# Patient Record
Sex: FEMALE | Race: WHITE | NOT HISPANIC OR LATINO | Employment: OTHER | URBAN - NONMETROPOLITAN AREA
[De-identification: names, ages, dates, MRNs, and addresses within clinical notes are randomized per-mention and may not be internally consistent; named-entity substitution may affect disease eponyms.]

---

## 2017-01-11 ENCOUNTER — APPOINTMENT (OUTPATIENT)
Dept: MAMMOGRAPHY | Facility: HOSPITAL | Age: 62
End: 2017-01-11

## 2017-01-12 ENCOUNTER — APPOINTMENT (OUTPATIENT)
Dept: BONE DENSITY | Facility: HOSPITAL | Age: 62
End: 2017-01-12

## 2017-02-06 ENCOUNTER — APPOINTMENT (OUTPATIENT)
Dept: MAMMOGRAPHY | Facility: HOSPITAL | Age: 62
End: 2017-02-06

## 2017-02-06 ENCOUNTER — APPOINTMENT (OUTPATIENT)
Dept: BONE DENSITY | Facility: HOSPITAL | Age: 62
End: 2017-02-06

## 2017-02-20 ENCOUNTER — TRANSCRIBE ORDERS (OUTPATIENT)
Dept: ADMINISTRATIVE | Facility: HOSPITAL | Age: 62
End: 2017-02-20

## 2017-02-20 ENCOUNTER — APPOINTMENT (OUTPATIENT)
Dept: BONE DENSITY | Facility: HOSPITAL | Age: 62
End: 2017-02-20

## 2017-02-20 ENCOUNTER — APPOINTMENT (OUTPATIENT)
Dept: MAMMOGRAPHY | Facility: HOSPITAL | Age: 62
End: 2017-02-20

## 2017-02-20 DIAGNOSIS — M81.0 OSTEOPOROSIS: Primary | ICD-10-CM

## 2017-03-06 ENCOUNTER — APPOINTMENT (OUTPATIENT)
Dept: BONE DENSITY | Facility: HOSPITAL | Age: 62
End: 2017-03-06

## 2017-03-06 ENCOUNTER — APPOINTMENT (OUTPATIENT)
Dept: MAMMOGRAPHY | Facility: HOSPITAL | Age: 62
End: 2017-03-06

## 2017-03-16 ENCOUNTER — APPOINTMENT (OUTPATIENT)
Dept: BONE DENSITY | Facility: HOSPITAL | Age: 62
End: 2017-03-16

## 2017-03-16 ENCOUNTER — APPOINTMENT (OUTPATIENT)
Dept: MAMMOGRAPHY | Facility: HOSPITAL | Age: 62
End: 2017-03-16

## 2017-03-17 ENCOUNTER — HOSPITAL ENCOUNTER (EMERGENCY)
Age: 62
Discharge: HOME | End: 2017-03-17
Attending: EMERGENCY MEDICINE
Payer: MEDICAID

## 2017-03-17 ENCOUNTER — APPOINTMENT (OUTPATIENT)
Dept: CT IMAGING | Age: 62
End: 2017-03-17
Payer: MEDICAID

## 2017-03-17 VITALS
OXYGEN SATURATION: 95 % | BODY MASS INDEX: 41.78 KG/M2 | SYSTOLIC BLOOD PRESSURE: 126 MMHG | HEART RATE: 95 BPM | TEMPERATURE: 98.2 F | RESPIRATION RATE: 18 BRPM | WEIGHT: 260 LBS | DIASTOLIC BLOOD PRESSURE: 70 MMHG | HEIGHT: 66 IN

## 2017-03-17 DIAGNOSIS — R11.2 NON-INTRACTABLE VOMITING WITH NAUSEA, UNSPECIFIED VOMITING TYPE: ICD-10-CM

## 2017-03-17 DIAGNOSIS — R10.9 ABDOMINAL PAIN, UNSPECIFIED LOCATION: Primary | ICD-10-CM

## 2017-03-17 LAB
ALBUMIN SERPL-MCNC: 4.1 G/DL (ref 3.5–5.2)
ALP BLD-CCNC: 85 U/L (ref 35–104)
ALT SERPL-CCNC: 33 U/L (ref 5–33)
ANION GAP SERPL CALCULATED.3IONS-SCNC: 10 MMOL/L (ref 7–19)
AST SERPL-CCNC: 27 U/L (ref 5–32)
BILIRUB SERPL-MCNC: 0.4 MG/DL (ref 0.2–1.2)
BILIRUBIN URINE: NEGATIVE
BLOOD, URINE: NEGATIVE
BUN BLDV-MCNC: 13 MG/DL (ref 8–23)
CALCIUM SERPL-MCNC: 9.4 MG/DL (ref 8.8–10.2)
CHLORIDE BLD-SCNC: 103 MMOL/L (ref 98–111)
CLARITY: CLEAR
CO2: 28 MMOL/L (ref 22–29)
COLOR: YELLOW
CREAT SERPL-MCNC: 0.6 MG/DL (ref 0.5–0.9)
GFR NON-AFRICAN AMERICAN: >60
GLOBULIN: 3.1 G/DL
GLUCOSE BLD-MCNC: 103 MG/DL (ref 74–109)
GLUCOSE URINE: NEGATIVE MG/DL
HCT VFR BLD CALC: 42.6 % (ref 37–47)
HEMOGLOBIN: 13.8 G/DL (ref 12–16)
KETONES, URINE: NEGATIVE MG/DL
LACTIC ACID: 1.1 MG/DL (ref 0.5–1.9)
LEUKOCYTE ESTERASE, URINE: NEGATIVE
LIPASE: 19 U/L (ref 13–60)
MCH RBC QN AUTO: 33.3 PG (ref 27–31)
MCHC RBC AUTO-ENTMCNC: 32.4 G/DL (ref 33–37)
MCV RBC AUTO: 102.7 FL (ref 81–99)
NITRITE, URINE: NEGATIVE
PDW BLD-RTO: 14.2 % (ref 11.5–14.5)
PERFORMED ON: NORMAL
PH UA: 6.5
PLATELET # BLD: 246 K/UL (ref 130–400)
PMV BLD AUTO: 12.3 FL (ref 7.4–10.4)
POC TROPONIN I: 0 NG/ML (ref 0–0.08)
POTASSIUM SERPL-SCNC: 4.3 MMOL/L (ref 3.5–5)
PROTEIN UA: NEGATIVE MG/DL
RBC # BLD: 4.15 M/UL (ref 4.2–5.4)
SODIUM BLD-SCNC: 141 MMOL/L (ref 136–145)
SPECIFIC GRAVITY UA: 1
TOTAL PROTEIN: 7.2 G/DL (ref 6.6–8.7)
UROBILINOGEN, URINE: 0.2 E.U./DL
WBC # BLD: 8.8 K/UL (ref 4.8–10.8)

## 2017-03-17 PROCEDURE — 36415 COLL VENOUS BLD VENIPUNCTURE: CPT

## 2017-03-17 PROCEDURE — 81003 URINALYSIS AUTO W/O SCOPE: CPT

## 2017-03-17 PROCEDURE — 85027 COMPLETE CBC AUTOMATED: CPT

## 2017-03-17 PROCEDURE — 96374 THER/PROPH/DIAG INJ IV PUSH: CPT

## 2017-03-17 PROCEDURE — 6360000004 HC RX CONTRAST MEDICATION: Performed by: EMERGENCY MEDICINE

## 2017-03-17 PROCEDURE — 99284 EMERGENCY DEPT VISIT MOD MDM: CPT

## 2017-03-17 PROCEDURE — 99283 EMERGENCY DEPT VISIT LOW MDM: CPT | Performed by: EMERGENCY MEDICINE

## 2017-03-17 PROCEDURE — 93005 ELECTROCARDIOGRAM TRACING: CPT

## 2017-03-17 PROCEDURE — 80053 COMPREHEN METABOLIC PANEL: CPT

## 2017-03-17 PROCEDURE — 83605 ASSAY OF LACTIC ACID: CPT

## 2017-03-17 PROCEDURE — 84484 ASSAY OF TROPONIN QUANT: CPT

## 2017-03-17 PROCEDURE — 96375 TX/PRO/DX INJ NEW DRUG ADDON: CPT

## 2017-03-17 PROCEDURE — 6360000002 HC RX W HCPCS: Performed by: EMERGENCY MEDICINE

## 2017-03-17 PROCEDURE — 83690 ASSAY OF LIPASE: CPT

## 2017-03-17 PROCEDURE — 74177 CT ABD & PELVIS W/CONTRAST: CPT

## 2017-03-17 RX ORDER — ONDANSETRON 2 MG/ML
4 INJECTION INTRAMUSCULAR; INTRAVENOUS ONCE
Status: COMPLETED | OUTPATIENT
Start: 2017-03-17 | End: 2017-03-17

## 2017-03-17 RX ORDER — GLUCOSAMINE HCL 500 MG
10000 TABLET ORAL DAILY
Status: ON HOLD | COMMUNITY
End: 2017-09-25 | Stop reason: HOSPADM

## 2017-03-17 RX ORDER — LANOLIN ALCOHOL/MO/W.PET/CERES
4000 CREAM (GRAM) TOPICAL DAILY
Status: ON HOLD | COMMUNITY
End: 2017-09-25 | Stop reason: HOSPADM

## 2017-03-17 RX ORDER — ONDANSETRON 4 MG/1
4 TABLET, ORALLY DISINTEGRATING ORAL EVERY 8 HOURS PRN
Qty: 15 TABLET | Refills: 0 | Status: SHIPPED | OUTPATIENT
Start: 2017-03-17 | End: 2018-03-27 | Stop reason: ALTCHOICE

## 2017-03-17 RX ORDER — DICLOFENAC SODIUM 75 MG/1
75 TABLET, DELAYED RELEASE ORAL 2 TIMES DAILY
Status: ON HOLD | COMMUNITY
End: 2017-09-25 | Stop reason: HOSPADM

## 2017-03-17 RX ADMIN — IOVERSOL 90 ML: 741 INJECTION INTRA-ARTERIAL; INTRAVENOUS at 10:47

## 2017-03-17 RX ADMIN — HYDROMORPHONE HYDROCHLORIDE 0.5 MG: 1 INJECTION, SOLUTION INTRAMUSCULAR; INTRAVENOUS; SUBCUTANEOUS at 09:34

## 2017-03-17 RX ADMIN — ONDANSETRON 4 MG: 2 INJECTION INTRAMUSCULAR; INTRAVENOUS at 09:33

## 2017-03-17 ASSESSMENT — ENCOUNTER SYMPTOMS
SHORTNESS OF BREATH: 0
NAUSEA: 1
DIARRHEA: 0
ABDOMINAL PAIN: 1
EYE PAIN: 0
VOMITING: 1

## 2017-03-17 ASSESSMENT — PAIN SCALES - GENERAL
PAINLEVEL_OUTOF10: 9
PAINLEVEL_OUTOF10: 10

## 2017-03-17 ASSESSMENT — PAIN DESCRIPTION - LOCATION: LOCATION: ABDOMEN

## 2017-03-17 ASSESSMENT — PAIN DESCRIPTION - ORIENTATION: ORIENTATION: RIGHT

## 2017-03-17 ASSESSMENT — PAIN DESCRIPTION - DESCRIPTORS: DESCRIPTORS: DULL;ACHING

## 2017-03-17 ASSESSMENT — PAIN DESCRIPTION - PAIN TYPE: TYPE: ACUTE PAIN

## 2017-03-21 LAB
EKG P AXIS: 6 DEGREES
EKG P-R INTERVAL: 170 MS
EKG Q-T INTERVAL: 378 MS
EKG QRS DURATION: 80 MS
EKG QTC CALCULATION (BAZETT): 402 MS
EKG T AXIS: 50 DEGREES

## 2017-04-05 ENCOUNTER — APPOINTMENT (OUTPATIENT)
Dept: BONE DENSITY | Facility: HOSPITAL | Age: 62
End: 2017-04-05

## 2017-04-05 ENCOUNTER — APPOINTMENT (OUTPATIENT)
Dept: MAMMOGRAPHY | Facility: HOSPITAL | Age: 62
End: 2017-04-05

## 2017-04-27 ENCOUNTER — APPOINTMENT (OUTPATIENT)
Dept: BONE DENSITY | Facility: HOSPITAL | Age: 62
End: 2017-04-27

## 2017-04-27 ENCOUNTER — APPOINTMENT (OUTPATIENT)
Dept: MAMMOGRAPHY | Facility: HOSPITAL | Age: 62
End: 2017-04-27

## 2017-05-23 ENCOUNTER — HOSPITAL ENCOUNTER (OUTPATIENT)
Dept: BONE DENSITY | Facility: HOSPITAL | Age: 62
Discharge: HOME | End: 2017-05-23

## 2017-05-23 ENCOUNTER — HOSPITAL ENCOUNTER (OUTPATIENT)
Dept: MAMMOGRAPHY | Facility: HOSPITAL | Age: 62
Discharge: HOME | End: 2017-05-23
Admitting: NURSE PRACTITIONER

## 2017-05-23 DIAGNOSIS — Z01.419 WELL WOMAN EXAM WITH ROUTINE GYNECOLOGICAL EXAM: ICD-10-CM

## 2017-05-23 DIAGNOSIS — M81.0 OSTEOPOROSIS: ICD-10-CM

## 2017-05-23 PROCEDURE — 77063 BREAST TOMOSYNTHESIS BI: CPT

## 2017-05-23 PROCEDURE — 77080 DXA BONE DENSITY AXIAL: CPT

## 2017-05-23 PROCEDURE — G0202 SCR MAMMO BI INCL CAD: HCPCS

## 2017-06-11 ENCOUNTER — APPOINTMENT (OUTPATIENT)
Dept: CT IMAGING | Age: 62
End: 2017-06-11
Payer: MEDICAID

## 2017-06-11 ENCOUNTER — HOSPITAL ENCOUNTER (EMERGENCY)
Age: 62
Discharge: HOME | End: 2017-06-11
Payer: MEDICAID

## 2017-06-11 ENCOUNTER — APPOINTMENT (OUTPATIENT)
Dept: GENERAL RADIOLOGY | Age: 62
End: 2017-06-11
Payer: MEDICAID

## 2017-06-11 VITALS
BODY MASS INDEX: 40.98 KG/M2 | WEIGHT: 255 LBS | DIASTOLIC BLOOD PRESSURE: 66 MMHG | HEART RATE: 74 BPM | HEIGHT: 66 IN | TEMPERATURE: 98.7 F | RESPIRATION RATE: 20 BRPM | SYSTOLIC BLOOD PRESSURE: 126 MMHG | OXYGEN SATURATION: 93 %

## 2017-06-11 DIAGNOSIS — S63.501A SPRAIN OF WRIST, RIGHT, INITIAL ENCOUNTER: ICD-10-CM

## 2017-06-11 DIAGNOSIS — M25.512 ACUTE PAIN OF LEFT SHOULDER: ICD-10-CM

## 2017-06-11 DIAGNOSIS — S09.90XA CLOSED HEAD INJURY, INITIAL ENCOUNTER: ICD-10-CM

## 2017-06-11 DIAGNOSIS — S16.1XXA CERVICAL STRAIN, INITIAL ENCOUNTER: ICD-10-CM
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
## 2017-06-11 DIAGNOSIS — S93.401A SPRAIN OF RIGHT ANKLE, UNSPECIFIED LIGAMENT, INITIAL ENCOUNTER: Primary | ICD-10-CM

## 2017-06-11 PROCEDURE — 99283 EMERGENCY DEPT VISIT LOW MDM: CPT | Performed by: PHYSICIAN ASSISTANT

## 2017-06-11 PROCEDURE — 73110 X-RAY EXAM OF WRIST: CPT

## 2017-06-11 PROCEDURE — 72125 CT NECK SPINE W/O DYE: CPT

## 2017-06-11 PROCEDURE — 70450 CT HEAD/BRAIN W/O DYE: CPT

## 2017-06-11 PROCEDURE — 73610 X-RAY EXAM OF ANKLE: CPT

## 2017-06-11 PROCEDURE — 73030 X-RAY EXAM OF SHOULDER: CPT

## 2017-06-11 PROCEDURE — 6360000002 HC RX W HCPCS: Performed by: PHYSICIAN ASSISTANT

## 2017-06-11 PROCEDURE — 96372 THER/PROPH/DIAG INJ SC/IM: CPT

## 2017-06-11 PROCEDURE — 99284 EMERGENCY DEPT VISIT MOD MDM: CPT

## 2017-06-11 RX ORDER — ORPHENADRINE CITRATE 30 MG/ML
60 INJECTION INTRAMUSCULAR; INTRAVENOUS ONCE
Status: COMPLETED | OUTPATIENT
Start: 2017-06-11 | End: 2017-06-11

## 2017-06-11 RX ORDER — CYCLOBENZAPRINE HCL 10 MG
5 TABLET ORAL 2 TIMES DAILY PRN
Qty: 20 TABLET | Refills: 0 | Status: SHIPPED | OUTPATIENT
Start: 2017-06-11

## 2017-06-11 RX ADMIN — ORPHENADRINE CITRATE 60 MG: 30 INJECTION INTRAMUSCULAR; INTRAVENOUS at 10:24

## 2017-06-11 ASSESSMENT — ENCOUNTER SYMPTOMS
VOMITING: 0
CONSTIPATION: 0
CHEST TIGHTNESS: 0
RHINORRHEA: 0
SHORTNESS OF BREATH: 0
SORE THROAT: 0
EYE PAIN: 0
NAUSEA: 0
ABDOMINAL DISTENTION: 0
WHEEZING: 0
COUGH: 0
ABDOMINAL PAIN: 0
DIARRHEA: 0
SINUS PRESSURE: 0
APNEA: 0

## 2017-06-11 ASSESSMENT — PAIN SCALES - GENERAL: PAINLEVEL_OUTOF10: 7

## 2017-07-11 ENCOUNTER — OFFICE VISIT (OUTPATIENT)
Dept: RETAIL CLINIC | Facility: CLINIC | Age: 62
End: 2017-07-11

## 2017-07-11 VITALS
OXYGEN SATURATION: 97 % | RESPIRATION RATE: 20 BRPM | SYSTOLIC BLOOD PRESSURE: 138 MMHG | TEMPERATURE: 98.1 F | DIASTOLIC BLOOD PRESSURE: 81 MMHG | HEART RATE: 98 BPM

## 2017-07-11 DIAGNOSIS — J02.9 ACUTE PHARYNGITIS, UNSPECIFIED ETIOLOGY: Primary | ICD-10-CM

## 2017-07-11 DIAGNOSIS — R59.0 LYMPHADENOPATHY, ANTERIOR CERVICAL: ICD-10-CM

## 2017-07-11 LAB
EXPIRATION DATE: NORMAL
INTERNAL CONTROL: NORMAL
Lab: NORMAL
S PYO AG THROAT QL: NEGATIVE

## 2017-07-11 PROCEDURE — 99213 OFFICE O/P EST LOW 20 MIN: CPT | Performed by: NURSE PRACTITIONER

## 2017-07-11 PROCEDURE — 87880 STREP A ASSAY W/OPTIC: CPT | Performed by: NURSE PRACTITIONER

## 2017-07-11 RX ORDER — AMOXICILLIN 875 MG/1
875 TABLET, COATED ORAL 2 TIMES DAILY
Qty: 20 TABLET | Refills: 0 | OUTPATIENT
Start: 2017-07-11 | End: 2018-08-02

## 2017-07-11 NOTE — PATIENT INSTRUCTIONS

## 2017-07-11 NOTE — PROGRESS NOTES
"Gaurav Davies is a 62 y.o. female here for sore throat.     Sore Throat    This is a new problem. The current episode started in the past 7 days (7 days ago, started after her son had a sore throat). The problem has been gradually worsening. Neither side of throat is experiencing more pain than the other. There has been no fever. The pain is moderate. Associated symptoms include neck pain (in gland area, it is hurting) and swollen glands. Pertinent negatives include no abdominal pain, congestion, coughing, diarrhea, drooling, ear discharge, ear pain, headaches, hoarse voice, plugged ear sensation, shortness of breath, trouble swallowing or vomiting. She has had no exposure to strep or mono. She has tried nothing for the symptoms.       The following portions of the patient's history were reviewed and updated as appropriate: allergies, current medications, past family history, past medical history, past social history, past surgical history and problem list.    Review of Systems   Constitutional: Negative for activity change and appetite change.   HENT: Positive for sore throat. Negative for congestion, drooling, ear discharge, ear pain, hoarse voice, postnasal drip, rhinorrhea and trouble swallowing.    Eyes: Negative for discharge and redness.   Respiratory: Negative for cough, chest tightness and shortness of breath.    Cardiovascular: Negative for leg swelling.   Gastrointestinal: Negative for abdominal pain, diarrhea, nausea and vomiting.   Genitourinary: Negative for dysuria.   Musculoskeletal: Positive for neck pain (in gland area, it is hurting). Negative for arthralgias and joint swelling.   Skin: Positive for rash (has been treating \"ringworm\" on left shin with lotrimin, and it is better).   Allergic/Immunologic: Negative for environmental allergies and food allergies.   Neurological: Negative for seizures and headaches.   Hematological: Positive for adenopathy.       Objective   Physical Exam " "  Constitutional: She is oriented to person, place, and time. She appears well-developed and well-nourished. No distress.   HENT:   Head: Normocephalic and atraumatic.   Right Ear: External ear normal.   Left Ear: External ear normal.   Nose: Nose normal.   Mouth/Throat: No oropharyngeal exudate (pharynx is beefy red, no PND noted, no exudate).   TM's WNL bilaterally   Eyes: Conjunctivae and EOM are normal. Pupils are equal, round, and reactive to light. Right eye exhibits no discharge. Left eye exhibits no discharge. No scleral icterus.   Neck: Normal range of motion. Neck supple. No JVD present. No tracheal deviation present. No thyromegaly present.   Cardiovascular: Normal rate, regular rhythm and normal heart sounds.    Pulmonary/Chest: Effort normal and breath sounds normal. No stridor. No respiratory distress. She has no wheezes.   Abdominal: Soft. She exhibits no distension. There is no tenderness.   Musculoskeletal: Normal range of motion. She exhibits no edema, tenderness or deformity.   Wearing ACE wrap right ankle (says she sprained ankle)   Lymphadenopathy:     She has cervical adenopathy (bilateral anterior cervical LAD tonsillar region).   Neurological: She is alert and oriented to person, place, and time. She exhibits normal muscle tone. Coordination normal.   Skin: Skin is warm and dry. No rash (2 \" patchy erythema on left lower shin that she says is improved after lotrimin) noted. She is not diaphoretic. No erythema. No pallor.   Psychiatric: She has a normal mood and affect. Her behavior is normal. Judgment and thought content normal.   Nursing note and vitals reviewed.      Assessment/Plan   Taylor was seen today for sore throat.    Diagnoses and all orders for this visit:    Acute pharyngitis, unspecified etiology  -     POC Rapid Strep A    Lymphadenopathy, anterior cervical  -     POC Rapid Strep A    Other orders  -     amoxicillin (AMOXIL) 875 MG tablet; Take 1 tablet by mouth 2 (Two) Times " a Day.       Strep ordered as she has been around her son and several other close contacts.  It was negative; give amoxicillin to have on hand if throat worsens or does not improve.

## 2017-09-20 ENCOUNTER — HOSPITAL ENCOUNTER (INPATIENT)
Age: 62
LOS: 5 days | Discharge: HOME | DRG: 885 | End: 2017-09-25
Attending: EMERGENCY MEDICINE | Admitting: PSYCHIATRY & NEUROLOGY
Payer: MEDICAID

## 2017-09-20 DIAGNOSIS — R45.851 DEPRESSION WITH SUICIDAL IDEATION: Primary | ICD-10-CM

## 2017-09-20 DIAGNOSIS — F32.A DEPRESSION WITH SUICIDAL IDEATION: Primary | ICD-10-CM

## 2017-09-20 DIAGNOSIS — R74.8 LIVER ENZYME ELEVATION: ICD-10-CM

## 2017-09-20 LAB
ACETAMINOPHEN LEVEL: <15 UG/ML
ALBUMIN SERPL-MCNC: 4.3 G/DL (ref 3.5–5.2)
ALP BLD-CCNC: 84 U/L (ref 35–104)
ALT SERPL-CCNC: 43 U/L (ref 5–33)
AMPHETAMINE SCREEN, URINE: NEGATIVE
ANION GAP SERPL CALCULATED.3IONS-SCNC: 13 MMOL/L (ref 7–19)
AST SERPL-CCNC: 35 U/L (ref 5–32)
BACTERIA: NEGATIVE /HPF
BARBITURATE SCREEN URINE: NEGATIVE
BASOPHILS ABSOLUTE: 0.1 K/UL (ref 0–0.2)
BASOPHILS RELATIVE PERCENT: 0.5 % (ref 0–1)
BENZODIAZEPINE SCREEN, URINE: NEGATIVE
BILIRUB SERPL-MCNC: 0.3 MG/DL (ref 0.2–1.2)
BILIRUBIN URINE: NEGATIVE
BLOOD, URINE: NEGATIVE
BUN BLDV-MCNC: 19 MG/DL (ref 8–23)
CALCIUM SERPL-MCNC: 9.7 MG/DL (ref 8.8–10.2)
CANNABINOID SCREEN URINE: NEGATIVE
CHLORIDE BLD-SCNC: 99 MMOL/L (ref 98–111)
CLARITY: CLEAR
CO2: 26 MMOL/L (ref 22–29)
COCAINE METABOLITE SCREEN URINE: NEGATIVE
COLOR: YELLOW
CREAT SERPL-MCNC: 0.6 MG/DL (ref 0.5–0.9)
EOSINOPHILS ABSOLUTE: 0.2 K/UL (ref 0–0.6)
EOSINOPHILS RELATIVE PERCENT: 1.9 % (ref 0–5)
EPITHELIAL CELLS, UA: 1 /HPF (ref 0–5)
ETHANOL: <10 MG/DL (ref 0–0.08)
GFR NON-AFRICAN AMERICAN: >60
GLUCOSE BLD-MCNC: 131 MG/DL (ref 70–99)
GLUCOSE BLD-MCNC: 142 MG/DL (ref 74–109)
GLUCOSE URINE: NEGATIVE MG/DL
HCT VFR BLD CALC: 44.8 % (ref 37–47)
HEMOGLOBIN: 15 G/DL (ref 12–16)
HYALINE CASTS: 0 /HPF (ref 0–8)
KETONES, URINE: NEGATIVE MG/DL
LEUKOCYTE ESTERASE, URINE: ABNORMAL
LYMPHOCYTES ABSOLUTE: 2.8 K/UL (ref 1.1–4.5)
LYMPHOCYTES RELATIVE PERCENT: 27.8 % (ref 20–40)
Lab: NORMAL
MCH RBC QN AUTO: 33.3 PG (ref 27–31)
MCHC RBC AUTO-ENTMCNC: 33.5 G/DL (ref 33–37)
MCV RBC AUTO: 99.6 FL (ref 81–99)
MONOCYTES ABSOLUTE: 0.7 K/UL (ref 0–0.9)
MONOCYTES RELATIVE PERCENT: 6.8 % (ref 0–10)
NEUTROPHILS ABSOLUTE: 6.4 K/UL (ref 1.5–7.5)
NEUTROPHILS RELATIVE PERCENT: 62.8 % (ref 50–65)
NITRITE, URINE: NEGATIVE
OPIATE SCREEN URINE: NEGATIVE
PDW BLD-RTO: 13.2 % (ref 11.5–14.5)
PERFORMED ON: ABNORMAL
PH UA: 5.5
PLATELET # BLD: 219 K/UL (ref 130–400)
PMV BLD AUTO: 11.8 FL (ref 9.4–12.3)
POTASSIUM SERPL-SCNC: 3.7 MMOL/L (ref 3.5–5)
PROTEIN UA: NEGATIVE MG/DL
RBC # BLD: 4.5 M/UL (ref 4.2–5.4)
RBC UA: 4 /HPF (ref 0–4)
SALICYLATE, SERUM: <3 MG/DL (ref 3–10)
SODIUM BLD-SCNC: 138 MMOL/L (ref 136–145)
SPECIFIC GRAVITY UA: 1.02
TOTAL PROTEIN: 7.6 G/DL (ref 6.6–8.7)
UROBILINOGEN, URINE: 0.2 E.U./DL
WBC # BLD: 10.2 K/UL (ref 4.8–10.8)
WBC UA: 2 /HPF (ref 0–5)

## 2017-09-20 PROCEDURE — 81001 URINALYSIS AUTO W/SCOPE: CPT

## 2017-09-20 PROCEDURE — 87086 URINE CULTURE/COLONY COUNT: CPT

## 2017-09-20 PROCEDURE — 6370000000 HC RX 637 (ALT 250 FOR IP): Performed by: EMERGENCY MEDICINE

## 2017-09-20 PROCEDURE — 1240000000 HC EMOTIONAL WELLNESS R&B

## 2017-09-20 PROCEDURE — 80307 DRUG TEST PRSMV CHEM ANLYZR: CPT

## 2017-09-20 PROCEDURE — 80053 COMPREHEN METABOLIC PANEL: CPT

## 2017-09-20 PROCEDURE — G0480 DRUG TEST DEF 1-7 CLASSES: HCPCS

## 2017-09-20 PROCEDURE — 82948 REAGENT STRIP/BLOOD GLUCOSE: CPT

## 2017-09-20 PROCEDURE — 99284 EMERGENCY DEPT VISIT MOD MDM: CPT | Performed by: EMERGENCY MEDICINE

## 2017-09-20 PROCEDURE — 85025 COMPLETE CBC W/AUTO DIFF WBC: CPT

## 2017-09-20 PROCEDURE — 99285 EMERGENCY DEPT VISIT HI MDM: CPT

## 2017-09-20 PROCEDURE — 36415 COLL VENOUS BLD VENIPUNCTURE: CPT

## 2017-09-20 RX ORDER — GABAPENTIN 100 MG/1
100 CAPSULE ORAL 3 TIMES DAILY
COMMUNITY
End: 2018-03-27 | Stop reason: ALTCHOICE

## 2017-09-20 RX ORDER — ACETAMINOPHEN 325 MG/1
650 TABLET ORAL EVERY 4 HOURS PRN
Status: DISCONTINUED | OUTPATIENT
Start: 2017-09-20 | End: 2017-09-25 | Stop reason: HOSPADM

## 2017-09-20 RX ORDER — CLOPIDOGREL BISULFATE 75 MG/1
75 TABLET ORAL DAILY
COMMUNITY
End: 2020-12-22

## 2017-09-20 RX ORDER — CLONAZEPAM 0.5 MG/1
0.5 TABLET ORAL ONCE
Status: COMPLETED | OUTPATIENT
Start: 2017-09-20 | End: 2017-09-20

## 2017-09-20 RX ADMIN — CLONAZEPAM 0.5 MG: 0.5 TABLET ORAL at 20:42

## 2017-09-21 PROBLEM — F43.10 POST TRAUMATIC STRESS DISORDER: Status: ACTIVE | Noted: 2017-09-21

## 2017-09-21 PROBLEM — F17.210 CIGARETTE NICOTINE DEPENDENCE WITHOUT COMPLICATION: Status: ACTIVE | Noted: 2017-09-21

## 2017-09-21 PROBLEM — M79.7 FIBROMYALGIA: Status: ACTIVE | Noted: 2017-09-21

## 2017-09-21 PROBLEM — E11.9 TYPE 2 DIABETES MELLITUS WITHOUT COMPLICATION (HCC): Status: ACTIVE | Noted: 2017-09-21

## 2017-09-21 PROBLEM — F13.21 BENZODIAZEPINE DEPENDENCE IN REMISSION (HCC): Status: ACTIVE | Noted: 2017-09-21

## 2017-09-21 PROBLEM — G47.30 SLEEP APNEA: Status: ACTIVE | Noted: 2017-09-21

## 2017-09-21 PROBLEM — F11.10 OPIOID ABUSE, EPISODIC USE (HCC): Status: ACTIVE | Noted: 2017-09-21

## 2017-09-21 PROBLEM — F31.9 BIPOLAR I DISORDER WITH ANXIOUS DISTRESS (HCC): Status: ACTIVE | Noted: 2017-09-21

## 2017-09-21 LAB
GLUCOSE BLD-MCNC: 108 MG/DL (ref 70–99)
GLUCOSE BLD-MCNC: 114 MG/DL (ref 70–99)
GLUCOSE BLD-MCNC: 130 MG/DL (ref 70–99)
GLUCOSE BLD-MCNC: 187 MG/DL (ref 70–99)
PERFORMED ON: ABNORMAL

## 2017-09-21 PROCEDURE — 1240000000 HC EMOTIONAL WELLNESS R&B

## 2017-09-21 PROCEDURE — 6370000000 HC RX 637 (ALT 250 FOR IP): Performed by: PSYCHIATRY & NEUROLOGY

## 2017-09-21 PROCEDURE — 82948 REAGENT STRIP/BLOOD GLUCOSE: CPT

## 2017-09-21 PROCEDURE — 90792 PSYCH DIAG EVAL W/MED SRVCS: CPT | Performed by: PSYCHIATRY & NEUROLOGY

## 2017-09-21 RX ORDER — GABAPENTIN 100 MG/1
100 CAPSULE ORAL 3 TIMES DAILY
Status: DISCONTINUED | OUTPATIENT
Start: 2017-09-21 | End: 2017-09-25 | Stop reason: HOSPADM

## 2017-09-21 RX ORDER — QUETIAPINE FUMARATE 50 MG/1
50 TABLET, FILM COATED ORAL NIGHTLY
Status: DISCONTINUED | OUTPATIENT
Start: 2017-09-21 | End: 2017-09-25

## 2017-09-21 RX ORDER — CLONAZEPAM 0.5 MG/1
0.5 TABLET ORAL ONCE
Status: COMPLETED | OUTPATIENT
Start: 2017-09-21 | End: 2017-09-21

## 2017-09-21 RX ORDER — SENNA PLUS 8.6 MG/1
1 TABLET ORAL NIGHTLY
Status: DISCONTINUED | OUTPATIENT
Start: 2017-09-21 | End: 2017-09-25

## 2017-09-21 RX ORDER — CLOPIDOGREL BISULFATE 75 MG/1
75 TABLET ORAL DAILY
Status: DISCONTINUED | OUTPATIENT
Start: 2017-09-21 | End: 2017-09-25 | Stop reason: HOSPADM

## 2017-09-21 RX ORDER — TRAZODONE HYDROCHLORIDE 50 MG/1
50 TABLET ORAL NIGHTLY PRN
Status: DISCONTINUED | OUTPATIENT
Start: 2017-09-21 | End: 2017-09-25

## 2017-09-21 RX ORDER — TRAZODONE HYDROCHLORIDE 50 MG/1
50 TABLET ORAL NIGHTLY
Status: DISCONTINUED | OUTPATIENT
Start: 2017-09-21 | End: 2017-09-21

## 2017-09-21 RX ORDER — CYCLOBENZAPRINE HCL 5 MG
5 TABLET ORAL 2 TIMES DAILY PRN
Status: DISCONTINUED | OUTPATIENT
Start: 2017-09-21 | End: 2017-09-25 | Stop reason: HOSPADM

## 2017-09-21 RX ORDER — DOCUSATE SODIUM 100 MG/1
100 CAPSULE, LIQUID FILLED ORAL DAILY
Status: DISCONTINUED | OUTPATIENT
Start: 2017-09-21 | End: 2017-09-25 | Stop reason: HOSPADM

## 2017-09-21 RX ORDER — HYDROXYZINE PAMOATE 25 MG/1
25 CAPSULE ORAL 3 TIMES DAILY PRN
Status: ON HOLD | COMMUNITY
End: 2017-09-25 | Stop reason: HOSPADM

## 2017-09-21 RX ORDER — DULOXETIN HYDROCHLORIDE 60 MG/1
120 CAPSULE, DELAYED RELEASE ORAL DAILY
Status: DISCONTINUED | OUTPATIENT
Start: 2017-09-21 | End: 2017-09-25 | Stop reason: HOSPADM

## 2017-09-21 RX ADMIN — TRAZODONE HYDROCHLORIDE 50 MG: 50 TABLET ORAL at 02:08

## 2017-09-21 RX ADMIN — TRAZODONE HYDROCHLORIDE 50 MG: 50 TABLET ORAL at 22:26

## 2017-09-21 RX ADMIN — ACETAMINOPHEN 650 MG: 325 TABLET, FILM COATED ORAL at 02:07

## 2017-09-21 RX ADMIN — CLOPIDOGREL BISULFATE 75 MG: 75 TABLET ORAL at 20:55

## 2017-09-21 RX ADMIN — ACETAMINOPHEN 650 MG: 325 TABLET, FILM COATED ORAL at 14:32

## 2017-09-21 RX ADMIN — QUETIAPINE FUMARATE 50 MG: 50 TABLET, FILM COATED ORAL at 20:17

## 2017-09-21 RX ADMIN — ACETAMINOPHEN 650 MG: 325 TABLET, FILM COATED ORAL at 19:38

## 2017-09-21 RX ADMIN — DOCUSATE SODIUM 100 MG: 100 CAPSULE, LIQUID FILLED ORAL at 19:27

## 2017-09-21 RX ADMIN — CLONAZEPAM 0.5 MG: 0.5 TABLET ORAL at 02:06

## 2017-09-21 RX ADMIN — METFORMIN HYDROCHLORIDE 500 MG: 500 TABLET, FILM COATED ORAL at 21:52

## 2017-09-21 RX ADMIN — CYCLOBENZAPRINE 5 MG: 5 TABLET, FILM COATED ORAL at 20:55

## 2017-09-21 RX ADMIN — DULOXETINE HYDROCHLORIDE 120 MG: 60 CAPSULE, DELAYED RELEASE ORAL at 19:27

## 2017-09-21 RX ADMIN — SENNOSIDES 8.6 MG: 8.6 TABLET, FILM COATED ORAL at 20:17

## 2017-09-21 RX ADMIN — GABAPENTIN 100 MG: 100 CAPSULE ORAL at 20:17

## 2017-09-21 ASSESSMENT — PAIN SCALES - GENERAL
PAINLEVEL_OUTOF10: 5
PAINLEVEL_OUTOF10: 8
PAINLEVEL_OUTOF10: 5
PAINLEVEL_OUTOF10: 3

## 2017-09-21 ASSESSMENT — SLEEP AND FATIGUE QUESTIONNAIRES
DIFFICULTY FALLING ASLEEP: YES
DIFFICULTY STAYING ASLEEP: YES
RESTFUL SLEEP: YES
DO YOU HAVE DIFFICULTY SLEEPING: YES
DIFFICULTY ARISING: NO
SLEEP PATTERN: DIFFICULTY FALLING ASLEEP;NIGHTMARES/TERRORS
AVERAGE NUMBER OF SLEEP HOURS: 8
DO YOU USE A SLEEP AID: YES

## 2017-09-21 ASSESSMENT — PATIENT HEALTH QUESTIONNAIRE - PHQ9: SUM OF ALL RESPONSES TO PHQ QUESTIONS 1-9: 9

## 2017-09-21 ASSESSMENT — LIFESTYLE VARIABLES: HISTORY_ALCOHOL_USE: NO

## 2017-09-22 LAB
CHOLESTEROL, TOTAL: 105 MG/DL (ref 160–199)
FOLATE: >20 NG/ML (ref 4.8–37.3)
GLUCOSE BLD-MCNC: 102 MG/DL (ref 70–99)
GLUCOSE BLD-MCNC: 110 MG/DL (ref 70–99)
GLUCOSE BLD-MCNC: 114 MG/DL (ref 70–99)
GLUCOSE BLD-MCNC: 118 MG/DL (ref 70–99)
HBA1C MFR BLD: 6.8 %
HDLC SERPL-MCNC: 42 MG/DL (ref 65–121)
LDL CHOLESTEROL CALCULATED: 41 MG/DL
PERFORMED ON: ABNORMAL
TRIGL SERPL-MCNC: 112 MG/DL (ref 150–199)
TSH SERPL DL<=0.05 MIU/L-ACNC: 1.45 UIU/ML (ref 0.27–4.2)
URINE CULTURE, ROUTINE: NORMAL
VITAMIN B-12: 554 PG/ML (ref 211–946)
VITAMIN D 25-HYDROXY: 52.4 NG/ML

## 2017-09-22 PROCEDURE — 82948 REAGENT STRIP/BLOOD GLUCOSE: CPT

## 2017-09-22 PROCEDURE — 6370000000 HC RX 637 (ALT 250 FOR IP): Performed by: PSYCHIATRY & NEUROLOGY

## 2017-09-22 PROCEDURE — 80061 LIPID PANEL: CPT

## 2017-09-22 PROCEDURE — 36415 COLL VENOUS BLD VENIPUNCTURE: CPT

## 2017-09-22 PROCEDURE — 82306 VITAMIN D 25 HYDROXY: CPT

## 2017-09-22 PROCEDURE — 82607 VITAMIN B-12: CPT

## 2017-09-22 PROCEDURE — 1240000000 HC EMOTIONAL WELLNESS R&B

## 2017-09-22 PROCEDURE — 82746 ASSAY OF FOLIC ACID SERUM: CPT

## 2017-09-22 PROCEDURE — 99231 SBSQ HOSP IP/OBS SF/LOW 25: CPT | Performed by: PSYCHIATRY & NEUROLOGY

## 2017-09-22 PROCEDURE — 84443 ASSAY THYROID STIM HORMONE: CPT

## 2017-09-22 PROCEDURE — 83036 HEMOGLOBIN GLYCOSYLATED A1C: CPT

## 2017-09-22 RX ADMIN — ACETAMINOPHEN 650 MG: 325 TABLET, FILM COATED ORAL at 04:59

## 2017-09-22 RX ADMIN — GABAPENTIN 100 MG: 100 CAPSULE ORAL at 08:32

## 2017-09-22 RX ADMIN — DULOXETINE HYDROCHLORIDE 120 MG: 60 CAPSULE, DELAYED RELEASE ORAL at 08:32

## 2017-09-22 RX ADMIN — METFORMIN HYDROCHLORIDE 500 MG: 500 TABLET, FILM COATED ORAL at 17:35

## 2017-09-22 RX ADMIN — GABAPENTIN 100 MG: 100 CAPSULE ORAL at 20:35

## 2017-09-22 RX ADMIN — ACETAMINOPHEN 650 MG: 325 TABLET, FILM COATED ORAL at 10:52

## 2017-09-22 RX ADMIN — CLOPIDOGREL BISULFATE 75 MG: 75 TABLET ORAL at 08:32

## 2017-09-22 RX ADMIN — QUETIAPINE FUMARATE 50 MG: 50 TABLET, FILM COATED ORAL at 21:15

## 2017-09-22 RX ADMIN — TRAZODONE HYDROCHLORIDE 50 MG: 50 TABLET ORAL at 23:16

## 2017-09-22 RX ADMIN — SENNOSIDES 8.6 MG: 8.6 TABLET, FILM COATED ORAL at 20:35

## 2017-09-22 RX ADMIN — METFORMIN HYDROCHLORIDE 500 MG: 500 TABLET, FILM COATED ORAL at 08:33

## 2017-09-22 RX ADMIN — GABAPENTIN 100 MG: 100 CAPSULE ORAL at 12:42

## 2017-09-22 RX ADMIN — ACETAMINOPHEN 650 MG: 325 TABLET, FILM COATED ORAL at 19:19

## 2017-09-22 RX ADMIN — ACETAMINOPHEN 650 MG: 325 TABLET, FILM COATED ORAL at 15:16

## 2017-09-22 RX ADMIN — DOCUSATE SODIUM 100 MG: 100 CAPSULE, LIQUID FILLED ORAL at 08:32

## 2017-09-22 RX ADMIN — CYCLOBENZAPRINE 5 MG: 5 TABLET, FILM COATED ORAL at 21:15

## 2017-09-22 ASSESSMENT — PAIN SCALES - GENERAL
PAINLEVEL_OUTOF10: 8
PAINLEVEL_OUTOF10: 4
PAINLEVEL_OUTOF10: 4
PAINLEVEL_OUTOF10: 3
PAINLEVEL_OUTOF10: 5

## 2017-09-23 LAB
GLUCOSE BLD-MCNC: 114 MG/DL (ref 70–99)
GLUCOSE BLD-MCNC: 117 MG/DL (ref 70–99)
GLUCOSE BLD-MCNC: 124 MG/DL (ref 70–99)
GLUCOSE BLD-MCNC: 99 MG/DL (ref 70–99)
PERFORMED ON: ABNORMAL
PERFORMED ON: NORMAL

## 2017-09-23 PROCEDURE — 6370000000 HC RX 637 (ALT 250 FOR IP): Performed by: NURSE PRACTITIONER

## 2017-09-23 PROCEDURE — 82948 REAGENT STRIP/BLOOD GLUCOSE: CPT

## 2017-09-23 PROCEDURE — 6370000000 HC RX 637 (ALT 250 FOR IP): Performed by: PSYCHIATRY & NEUROLOGY

## 2017-09-23 PROCEDURE — 6360000002 HC RX W HCPCS: Performed by: NURSE PRACTITIONER

## 2017-09-23 PROCEDURE — 1240000000 HC EMOTIONAL WELLNESS R&B

## 2017-09-23 PROCEDURE — 99231 SBSQ HOSP IP/OBS SF/LOW 25: CPT | Performed by: NURSE PRACTITIONER

## 2017-09-23 RX ORDER — ONDANSETRON 4 MG/1
4 TABLET, FILM COATED ORAL EVERY 6 HOURS PRN
Status: DISCONTINUED | OUTPATIENT
Start: 2017-09-23 | End: 2017-09-25 | Stop reason: HOSPADM

## 2017-09-23 RX ORDER — CLONIDINE HYDROCHLORIDE 0.1 MG/1
0.1 TABLET ORAL EVERY 4 HOURS PRN
Status: DISCONTINUED | OUTPATIENT
Start: 2017-09-23 | End: 2017-09-25 | Stop reason: HOSPADM

## 2017-09-23 RX ADMIN — ONDANSETRON HYDROCHLORIDE 4 MG: 4 TABLET, FILM COATED ORAL at 16:50

## 2017-09-23 RX ADMIN — TRAZODONE HYDROCHLORIDE 50 MG: 50 TABLET ORAL at 21:35

## 2017-09-23 RX ADMIN — DOCUSATE SODIUM 100 MG: 100 CAPSULE, LIQUID FILLED ORAL at 08:56

## 2017-09-23 RX ADMIN — SENNOSIDES 8.6 MG: 8.6 TABLET, FILM COATED ORAL at 21:19

## 2017-09-23 RX ADMIN — QUETIAPINE FUMARATE 50 MG: 50 TABLET, FILM COATED ORAL at 21:19

## 2017-09-23 RX ADMIN — ACETAMINOPHEN 650 MG: 325 TABLET, FILM COATED ORAL at 17:43

## 2017-09-23 RX ADMIN — CYCLOBENZAPRINE 5 MG: 5 TABLET, FILM COATED ORAL at 21:18

## 2017-09-23 RX ADMIN — ACETAMINOPHEN 650 MG: 325 TABLET, FILM COATED ORAL at 21:44

## 2017-09-23 RX ADMIN — CHLORASEPTIC 1 SPRAY: 1.5 LIQUID ORAL at 13:12

## 2017-09-23 RX ADMIN — CYCLOBENZAPRINE 5 MG: 5 TABLET, FILM COATED ORAL at 08:56

## 2017-09-23 RX ADMIN — ONDANSETRON HYDROCHLORIDE 4 MG: 4 TABLET, FILM COATED ORAL at 14:59

## 2017-09-23 RX ADMIN — GABAPENTIN 100 MG: 100 CAPSULE ORAL at 21:19

## 2017-09-23 RX ADMIN — ACETAMINOPHEN 650 MG: 325 TABLET, FILM COATED ORAL at 04:32

## 2017-09-23 RX ADMIN — METFORMIN HYDROCHLORIDE 500 MG: 500 TABLET, FILM COATED ORAL at 17:25

## 2017-09-23 RX ADMIN — ACETAMINOPHEN 650 MG: 325 TABLET, FILM COATED ORAL at 13:12

## 2017-09-23 RX ADMIN — METFORMIN HYDROCHLORIDE 500 MG: 500 TABLET, FILM COATED ORAL at 08:56

## 2017-09-23 RX ADMIN — DULOXETINE HYDROCHLORIDE 120 MG: 60 CAPSULE, DELAYED RELEASE ORAL at 08:55

## 2017-09-23 RX ADMIN — GABAPENTIN 100 MG: 100 CAPSULE ORAL at 13:12

## 2017-09-23 RX ADMIN — GABAPENTIN 100 MG: 100 CAPSULE ORAL at 08:56

## 2017-09-23 RX ADMIN — CHLORASEPTIC 1 SPRAY: 1.5 LIQUID ORAL at 17:44

## 2017-09-23 RX ADMIN — CLOPIDOGREL BISULFATE 75 MG: 75 TABLET ORAL at 08:55

## 2017-09-23 RX ADMIN — ACETAMINOPHEN 650 MG: 325 TABLET, FILM COATED ORAL at 08:56

## 2017-09-23 ASSESSMENT — PAIN SCALES - GENERAL
PAINLEVEL_OUTOF10: 5
PAINLEVEL_OUTOF10: 4
PAINLEVEL_OUTOF10: 7
PAINLEVEL_OUTOF10: 4
PAINLEVEL_OUTOF10: 4
PAINLEVEL_OUTOF10: 8

## 2017-09-24 LAB
GLUCOSE BLD-MCNC: 125 MG/DL (ref 70–99)
GLUCOSE BLD-MCNC: 139 MG/DL (ref 70–99)
GLUCOSE BLD-MCNC: 146 MG/DL (ref 70–99)
GLUCOSE BLD-MCNC: 92 MG/DL (ref 70–99)
PERFORMED ON: ABNORMAL
PERFORMED ON: NORMAL

## 2017-09-24 PROCEDURE — 82948 REAGENT STRIP/BLOOD GLUCOSE: CPT

## 2017-09-24 PROCEDURE — 1240000000 HC EMOTIONAL WELLNESS R&B

## 2017-09-24 PROCEDURE — 6370000000 HC RX 637 (ALT 250 FOR IP): Performed by: PSYCHIATRY & NEUROLOGY

## 2017-09-24 PROCEDURE — 6360000002 HC RX W HCPCS: Performed by: NURSE PRACTITIONER

## 2017-09-24 RX ADMIN — DULOXETINE HYDROCHLORIDE 120 MG: 60 CAPSULE, DELAYED RELEASE ORAL at 08:37

## 2017-09-24 RX ADMIN — CHLORASEPTIC 1 SPRAY: 1.5 LIQUID ORAL at 03:34

## 2017-09-24 RX ADMIN — SENNOSIDES 8.6 MG: 8.6 TABLET, FILM COATED ORAL at 21:17

## 2017-09-24 RX ADMIN — CYCLOBENZAPRINE 5 MG: 5 TABLET, FILM COATED ORAL at 08:37

## 2017-09-24 RX ADMIN — CYCLOBENZAPRINE 5 MG: 5 TABLET, FILM COATED ORAL at 21:17

## 2017-09-24 RX ADMIN — METFORMIN HYDROCHLORIDE 500 MG: 500 TABLET, FILM COATED ORAL at 08:37

## 2017-09-24 RX ADMIN — ACETAMINOPHEN 650 MG: 325 TABLET, FILM COATED ORAL at 16:24

## 2017-09-24 RX ADMIN — ACETAMINOPHEN 650 MG: 325 TABLET, FILM COATED ORAL at 03:32

## 2017-09-24 RX ADMIN — CHLORASEPTIC 1 SPRAY: 1.5 LIQUID ORAL at 08:37

## 2017-09-24 RX ADMIN — TRAZODONE HYDROCHLORIDE 50 MG: 50 TABLET ORAL at 22:36

## 2017-09-24 RX ADMIN — CHLORASEPTIC 1 SPRAY: 1.5 LIQUID ORAL at 22:36

## 2017-09-24 RX ADMIN — CLOPIDOGREL BISULFATE 75 MG: 75 TABLET ORAL at 08:37

## 2017-09-24 RX ADMIN — ONDANSETRON HYDROCHLORIDE 4 MG: 4 TABLET, FILM COATED ORAL at 11:30

## 2017-09-24 RX ADMIN — QUETIAPINE FUMARATE 50 MG: 50 TABLET, FILM COATED ORAL at 21:17

## 2017-09-24 RX ADMIN — DOCUSATE SODIUM 100 MG: 100 CAPSULE, LIQUID FILLED ORAL at 08:37

## 2017-09-24 RX ADMIN — ONDANSETRON HYDROCHLORIDE 4 MG: 4 TABLET, FILM COATED ORAL at 17:38

## 2017-09-24 RX ADMIN — GABAPENTIN 100 MG: 100 CAPSULE ORAL at 21:17

## 2017-09-24 RX ADMIN — GABAPENTIN 100 MG: 100 CAPSULE ORAL at 13:29

## 2017-09-24 RX ADMIN — ACETAMINOPHEN 650 MG: 325 TABLET, FILM COATED ORAL at 08:37

## 2017-09-24 RX ADMIN — GABAPENTIN 100 MG: 100 CAPSULE ORAL at 08:37

## 2017-09-24 RX ADMIN — METFORMIN HYDROCHLORIDE 500 MG: 500 TABLET, FILM COATED ORAL at 17:15

## 2017-09-24 RX ADMIN — CHLORASEPTIC 1 SPRAY: 1.5 LIQUID ORAL at 16:24

## 2017-09-24 ASSESSMENT — PAIN SCALES - GENERAL
PAINLEVEL_OUTOF10: 3
PAINLEVEL_OUTOF10: 2
PAINLEVEL_OUTOF10: 2
PAINLEVEL_OUTOF10: 3

## 2017-09-25 VITALS
SYSTOLIC BLOOD PRESSURE: 141 MMHG | WEIGHT: 259.5 LBS | TEMPERATURE: 96.3 F | BODY MASS INDEX: 41.71 KG/M2 | OXYGEN SATURATION: 92 % | RESPIRATION RATE: 20 BRPM | HEIGHT: 66 IN | HEART RATE: 76 BPM | DIASTOLIC BLOOD PRESSURE: 68 MMHG

## 2017-09-25 LAB
GLUCOSE BLD-MCNC: 128 MG/DL (ref 70–99)
GLUCOSE BLD-MCNC: 91 MG/DL (ref 70–99)
PERFORMED ON: ABNORMAL
PERFORMED ON: NORMAL

## 2017-09-25 PROCEDURE — 6370000000 HC RX 637 (ALT 250 FOR IP): Performed by: PSYCHIATRY & NEUROLOGY

## 2017-09-25 PROCEDURE — 5130000000 HC BRIDGE APPOINTMENT

## 2017-09-25 PROCEDURE — 6360000002 HC RX W HCPCS: Performed by: NURSE PRACTITIONER

## 2017-09-25 PROCEDURE — 82948 REAGENT STRIP/BLOOD GLUCOSE: CPT

## 2017-09-25 RX ORDER — QUETIAPINE FUMARATE 100 MG/1
100 TABLET, FILM COATED ORAL NIGHTLY
Qty: 30 TABLET | Refills: 1 | Status: SHIPPED | OUTPATIENT
Start: 2017-09-25 | End: 2018-03-27 | Stop reason: ALTCHOICE

## 2017-09-25 RX ORDER — QUETIAPINE FUMARATE 100 MG/1
100 TABLET, FILM COATED ORAL NIGHTLY
Status: DISCONTINUED | OUTPATIENT
Start: 2017-09-25 | End: 2017-09-25 | Stop reason: HOSPADM

## 2017-09-25 RX ADMIN — GABAPENTIN 100 MG: 100 CAPSULE ORAL at 12:58

## 2017-09-25 RX ADMIN — ACETAMINOPHEN 650 MG: 325 TABLET, FILM COATED ORAL at 09:05

## 2017-09-25 RX ADMIN — ONDANSETRON HYDROCHLORIDE 4 MG: 4 TABLET, FILM COATED ORAL at 13:16

## 2017-09-25 RX ADMIN — METFORMIN HYDROCHLORIDE 500 MG: 500 TABLET, FILM COATED ORAL at 09:05

## 2017-09-25 RX ADMIN — DOCUSATE SODIUM 100 MG: 100 CAPSULE, LIQUID FILLED ORAL at 09:05

## 2017-09-25 RX ADMIN — GABAPENTIN 100 MG: 100 CAPSULE ORAL at 09:05

## 2017-09-25 RX ADMIN — CHLORASEPTIC 1 SPRAY: 1.5 LIQUID ORAL at 09:04

## 2017-09-25 RX ADMIN — CYCLOBENZAPRINE 5 MG: 5 TABLET, FILM COATED ORAL at 09:04

## 2017-09-25 RX ADMIN — CLOPIDOGREL BISULFATE 75 MG: 75 TABLET ORAL at 09:04

## 2017-09-25 RX ADMIN — DULOXETINE HYDROCHLORIDE 120 MG: 60 CAPSULE, DELAYED RELEASE ORAL at 09:05

## 2017-09-25 ASSESSMENT — PAIN SCALES - GENERAL: PAINLEVEL_OUTOF10: 2

## 2018-03-22 ENCOUNTER — TRANSCRIBE ORDERS (OUTPATIENT)
Dept: ADMINISTRATIVE | Facility: HOSPITAL | Age: 63
End: 2018-03-22

## 2018-03-22 DIAGNOSIS — M25.512 ACUTE PAIN OF LEFT SHOULDER: Primary | ICD-10-CM

## 2018-03-25 ENCOUNTER — HOSPITAL ENCOUNTER (EMERGENCY)
Facility: HOSPITAL | Age: 63
Discharge: HOME | End: 2018-03-25
Admitting: NURSE PRACTITIONER

## 2018-03-25 VITALS
BODY MASS INDEX: 40.98 KG/M2 | WEIGHT: 255 LBS | HEART RATE: 72 BPM | OXYGEN SATURATION: 96 % | DIASTOLIC BLOOD PRESSURE: 75 MMHG | SYSTOLIC BLOOD PRESSURE: 118 MMHG | TEMPERATURE: 97.5 F | HEIGHT: 66 IN | RESPIRATION RATE: 18 BRPM

## 2018-03-25 DIAGNOSIS — F41.9 ANXIETY: ICD-10-CM

## 2018-03-25 DIAGNOSIS — F41.0 PANIC ATTACKS: Primary | ICD-10-CM

## 2018-03-25 PROCEDURE — 99284 EMERGENCY DEPT VISIT MOD MDM: CPT

## 2018-03-25 RX ORDER — CLONAZEPAM 1 MG/1
1 TABLET ORAL 3 TIMES DAILY PRN
Qty: 3 TABLET | Refills: 0 | Status: SHIPPED | OUTPATIENT
Start: 2018-03-25 | End: 2018-03-26

## 2018-03-25 RX ORDER — CLONAZEPAM 0.5 MG/1
0.5 TABLET ORAL ONCE
Status: COMPLETED | OUTPATIENT
Start: 2018-03-25 | End: 2018-03-25

## 2018-03-25 RX ADMIN — CLONAZEPAM 0.5 MG: 0.5 TABLET ORAL at 17:53

## 2018-03-25 NOTE — ED NOTES
Patient reports a history of generalized anxiety disorder. Patient states that over the past week she has felt her anxiety levels increase. Patient states that she has not slept over the past three days due to her anxiety. Patient also reports that she has an appoitment with her psychiatrist this Wednesday. Patient reports that she just needs something to help her get through.      Yu Vitale RN  03/25/18 [id number]

## 2018-03-25 NOTE — DISCHARGE INSTRUCTIONS
Take medications as prescribed.   Call Monday morning and see if they can move your appointment up.   Return to ER with worsening symptoms.         Generalized Anxiety Disorder, Adult  Generalized anxiety disorder (CESARIO) is a mental health disorder. People with this condition constantly worry about everyday events. Unlike normal anxiety, worry related to CESARIO is not triggered by a specific event. These worries also do not fade or get better with time. CESARIO interferes with life functions, including relationships, work, and school.  CESARIO can vary from mild to severe. People with severe CESARIO can have intense waves of anxiety with physical symptoms (panic attacks).  What are the causes?  The exact cause of CESARIO is not known.  What increases the risk?  This condition is more likely to develop in:  · Women.  · People who have a family history of anxiety disorders.  · People who are very shy.  · People who experience very stressful life events, such as the death of a loved one.  · People who have a very stressful family environment.  What are the signs or symptoms?  People with CESARIO often worry excessively about many things in their lives, such as their health and family. They may also be overly concerned about:  · Doing well at work.  · Being on time.  · Natural disasters.  · Friendships.  Physical symptoms of CESARIO include:  · Fatigue.  · Muscle tension or having muscle twitches.  · Trembling or feeling shaky.  · Being easily startled.  · Feeling like your heart is pounding or racing.  · Feeling out of breath or like you cannot take a deep breath.  · Having trouble falling asleep or staying asleep.  · Sweating.  · Nausea, diarrhea, or irritable bowel syndrome (IBS).  · Headaches.  · Trouble concentrating or remembering facts.  · Restlessness.  · Irritability.  How is this diagnosed?  Your health care provider can diagnose CESARIO based on your symptoms and medical history. You will also have a physical exam. The health care provider  will ask specific questions about your symptoms, including how severe they are, when they started, and if they come and go. Your health care provider may ask you about your use of alcohol or drugs, including prescription medicines. Your health care provider may refer you to a mental health specialist for further evaluation.  Your health care provider will do a thorough examination and may perform additional tests to rule out other possible causes of your symptoms.  To be diagnosed with CESARIO, a person must have anxiety that:  · Is out of his or her control.  · Affects several different aspects of his or her life, such as work and relationships.  · Causes distress that makes him or her unable to take part in normal activities.  · Includes at least three physical symptoms of CESARIO, such as restlessness, fatigue, trouble concentrating, irritability, muscle tension, or sleep problems.  Before your health care provider can confirm a diagnosis of CESARIO, these symptoms must be present more days than they are not, and they must last for six months or longer.  How is this treated?  The following therapies are usually used to treat CESARIO:  · Medicine. Antidepressant medicine is usually prescribed for long-term daily control. Antianxiety medicines may be added in severe cases, especially when panic attacks occur.  · Talk therapy (psychotherapy). Certain types of talk therapy can be helpful in treating CESARIO by providing support, education, and guidance. Options include:  ¨ Cognitive behavioral therapy (CBT). People learn coping skills and techniques to ease their anxiety. They learn to identify unrealistic or negative thoughts and behaviors and to replace them with positive ones.  ¨ Acceptance and commitment therapy (ACT). This treatment teaches people how to be mindful as a way to cope with unwanted thoughts and feelings.  ¨ Biofeedback. This process trains you to manage your body's response (physiological response) through breathing  techniques and relaxation methods. You will work with a therapist while machines are used to monitor your physical symptoms.  · Stress management techniques. These include yoga, meditation, and exercise.  A mental health specialist can help determine which treatment is best for you. Some people see improvement with one type of therapy. However, other people require a combination of therapies.  Follow these instructions at home:  · Take over-the-counter and prescription medicines only as told by your health care provider.  · Try to maintain a normal routine.  · Try to anticipate stressful situations and allow extra time to manage them.  · Practice any stress management or self-calming techniques as taught by your health care provider.  · Do not punish yourself for setbacks or for not making progress.  · Try to recognize your accomplishments, even if they are small.  · Keep all follow-up visits as told by your health care provider. This is important.  Contact a health care provider if:  · Your symptoms do not get better.  · Your symptoms get worse.  · You have signs of depression, such as:  ¨ A persistently sad, cranky, or irritable mood.  ¨ Loss of enjoyment in activities that used to bring you evonne.  ¨ Change in weight or eating.  ¨ Changes in sleeping habits.  ¨ Avoiding friends or family members.  ¨ Loss of energy for normal tasks.  ¨ Feelings of guilt or worthlessness.  Get help right away if:  · You have serious thoughts about hurting yourself or others.  If you ever feel like you may hurt yourself or others, or have thoughts about taking your own life, get help right away. You can go to your nearest emergency department or call:  · Your local emergency services (911 in the [place]  · A suicide crisis helpline, such as the National Suicide Prevention Lifeline at 1-739.826.4178. This is open 24 hours a day.  Summary  · Generalized anxiety disorder (CESARIO) is a mental health disorder that involves worry that is not  triggered by a specific event.  · People with CESARIO often worry excessively about many things in their lives, such as their health and family.  · CESARIO may cause physical symptoms such as restlessness, trouble concentrating, sleep problems, frequent sweating, nausea, diarrhea, headaches, and trembling or muscle twitching.  · A mental health specialist can help determine which treatment is best for you. Some people see improvement with one type of therapy. However, other people require a combination of therapies.  This information is not intended to replace advice given to you by your health care provider. Make sure you discuss any questions you have with your health care provider.  Document Released: 04/14/2014 Document Revised: 11/07/2017 Document Reviewed: 11/07/2017  5 examples Patient Education © 2017 [employer]      Panic Attacks  Panic attacks are sudden, short feelings of great fear or discomfort. You may have them for no reason when you are relaxed, when you are uneasy (anxious), or when you are sleeping.  Follow these instructions at home:  · Take all your medicines as told.  · Check with your doctor before starting new medicines.  · Keep all doctor visits.  Contact a doctor if:  · You are not able to take your medicines as told.  · Your symptoms do not get better.  · Your symptoms get worse.  Get help right away if:  · Your attacks seem different than your normal attacks.  · You have thoughts about hurting yourself or others.  · You take panic attack medicine and you have a side effect.  This information is not intended to replace advice given to you by your health care provider. Make sure you discuss any questions you have with your health care provider.  Document Released: 01/20/2012 Document Revised: 05/25/2017 Document Reviewed: 08/01/2014  5 examples Patient Education © 2017 [employer]    Panic Attacks  Panic attacks are sudden, short-lived surges of severe anxiety, fear, or  discomfort. They may occur for no reason when you are relaxed, when you are anxious, or when you are sleeping. Panic attacks may occur for a number of reasons:  · Healthy people occasionally have panic attacks in extreme, life-threatening situations, such as war or natural disasters. Normal anxiety is a protective mechanism of the body that helps us react to danger (fight or flight response).  · Panic attacks are often seen with anxiety disorders, such as panic disorder, social anxiety disorder, generalized anxiety disorder, and phobias. Anxiety disorders cause excessive or uncontrollable anxiety. They may interfere with your relationships or other life activities.  · Panic attacks are sometimes seen with other mental illnesses, such as depression and posttraumatic stress disorder.  · Certain medical conditions, prescription medicines, and drugs of abuse can cause panic attacks.  What are the signs or symptoms?  Panic attacks start suddenly, peak within 20 minutes, and are accompanied by four or more of the following symptoms:  · Pounding heart or fast heart rate (palpitations).  · Sweating.  · Trembling or shaking.  · Shortness of breath or feeling smothered.  · Feeling choked.  · Chest pain or discomfort.  · Nausea or strange feeling in your stomach.  · Dizziness, light-headedness, or feeling like you will faint.  · Chills or hot flushes.  · Numbness or tingling in your lips or hands and feet.  · Feeling that things are not real or feeling that you are not yourself.  · Fear of losing control or going crazy.  · Fear of dying.  Some of these symptoms can mimic serious medical conditions. For example, you may think you are having a heart attack. Although panic attacks can be very scary, they are not life threatening.  How is this diagnosed?  Panic attacks are diagnosed through an assessment by your health care provider. Your health care provider will ask questions about your symptoms, such as where and when they  occurred. Your health care provider will also ask about your medical history and use of alcohol and drugs, including prescription medicines. Your health care provider may order blood tests or other studies to rule out a serious medical condition. Your health care provider may refer you to a mental health professional for further evaluation.  How is this treated?  · Most healthy people who have one or two panic attacks in an extreme, life-threatening situation will not require treatment.  · The treatment for panic attacks associated with anxiety disorders or other mental illness typically involves counseling with a mental health professional, medicine, or a combination of both. Your health care provider will help determine what treatment is best for you.  · Panic attacks due to physical illness usually go away with treatment of the illness. If prescription medicine is causing panic attacks, talk with your health care provider about stopping the medicine, decreasing the dose, or substituting another medicine.  · Panic attacks due to alcohol or drug abuse go away with abstinence. Some adults need professional help in order to stop drinking or using drugs.  Follow these instructions at home:  · Take all medicines as directed by your health care provider.  · Schedule and attend follow-up visits as directed by your health care provider. It is important to keep all your appointments.  Contact a health care provider if:  · You are not able to take your medicines as prescribed.  · Your symptoms do not improve or get worse.  Get help right away if:  · You experience panic attack symptoms that are different than your usual symptoms.  · You have serious thoughts about hurting yourself or others.  · You are taking medicine for panic attacks and have a serious side effect.  This information is not intended to replace advice given to you by your health care provider. Make sure you discuss any questions you have with your health  care provider.  Document Released: 12/18/2006 Document Revised: 05/25/2017 Document Reviewed: 08/01/2014  [employer] Interactive Patient Education © 2017 [employer]    Panic Attacks  Panic attacks are sudden, short-lived surges of severe anxiety, fear, or discomfort. They may occur for no reason when you are relaxed, when you are anxious, or when you are sleeping. Panic attacks may occur for a number of reasons:  · Healthy people occasionally have panic attacks in extreme, life-threatening situations, such as war or natural disasters. Normal anxiety is a protective mechanism of the body that helps us react to danger (fight or flight response).  · Panic attacks are often seen with anxiety disorders, such as panic disorder, social anxiety disorder, generalized anxiety disorder, and phobias. Anxiety disorders cause excessive or uncontrollable anxiety. They may interfere with your relationships or other life activities.  · Panic attacks are sometimes seen with other mental illnesses, such as depression and posttraumatic stress disorder.  · Certain medical conditions, prescription medicines, and drugs of abuse can cause panic attacks.  What are the signs or symptoms?  Panic attacks start suddenly, peak within 20 minutes, and are accompanied by four or more of the following symptoms:  · Pounding heart or fast heart rate (palpitations).  · Sweating.  · Trembling or shaking.  · Shortness of breath or feeling smothered.  · Feeling choked.  · Chest pain or discomfort.  · Nausea or strange feeling in your stomach.  · Dizziness, light-headedness, or feeling like you will faint.  · Chills or hot flushes.  · Numbness or tingling in your lips or hands and feet.  · Feeling that things are not real or feeling that you are not yourself.  · Fear of losing control or going crazy.  · Fear of dying.  Some of these symptoms can mimic serious medical conditions. For example, you may think you are having a heart attack. Although panic  attacks can be very scary, they are not life threatening.  How is this diagnosed?  Panic attacks are diagnosed through an assessment by your health care provider. Your health care provider will ask questions about your symptoms, such as where and when they occurred. Your health care provider will also ask about your medical history and use of alcohol and drugs, including prescription medicines. Your health care provider may order blood tests or other studies to rule out a serious medical condition. Your health care provider may refer you to a mental health professional for further evaluation.  How is this treated?  · Most healthy people who have one or two panic attacks in an extreme, life-threatening situation will not require treatment.  · The treatment for panic attacks associated with anxiety disorders or other mental illness typically involves counseling with a mental health professional, medicine, or a combination of both. Your health care provider will help determine what treatment is best for you.  · Panic attacks due to physical illness usually go away with treatment of the illness. If prescription medicine is causing panic attacks, talk with your health care provider about stopping the medicine, decreasing the dose, or substituting another medicine.  · Panic attacks due to alcohol or drug abuse go away with abstinence. Some adults need professional help in order to stop drinking or using drugs.  Follow these instructions at home:  · Take all medicines as directed by your health care provider.  · Schedule and attend follow-up visits as directed by your health care provider. It is important to keep all your appointments.  Contact a health care provider if:  · You are not able to take your medicines as prescribed.  · Your symptoms do not improve or get worse.  Get help right away if:  · You experience panic attack symptoms that are different than your usual symptoms.  · You have serious thoughts about hurting  yourself or others.  · You are taking medicine for panic attacks and have a serious side effect.  This information is not intended to replace advice given to you by your health care provider. Make sure you discuss any questions you have with your health care provider.  Document Released: 12/18/2006 Document Revised: 05/25/2017 Document Reviewed: 08/01/2014  [employer] Interactive Patient Education © 2017 [employer]

## 2018-03-25 NOTE — ED PROVIDER NOTES
"Subjective     History provided by:  Patient   used: No    Anxiety   Presents for initial visit. Onset was in the past 7 days. The problem has been rapidly worsening. Symptoms include chest pain, depressed mood, hyperventilation, irritability, nausea, nervous/anxious behavior, panic and restlessness. Patient reports no suicidal ideas. Symptoms occur constantly. The most recent episode lasted 3 days. The severity of symptoms is interfering with daily activities. The symptoms are aggravated by medication withdrawal and medication. The quality of sleep is non-restorative. Nighttime awakenings: has not slept since Thursday.     Risk factors include change in medication. Her past medical history is significant for anxiety/panic attacks. Past treatments include benzodiazephines, counseling (CBT), lifestyle changes, non-benzodiazephine anxiolytics, SSRIs and non-SSRI antidepressants. The treatment provided mild relief. Compliance with prior treatments has been good.     62 year old female presents with severe anxiety. She states that she has not been asleep since Thursday. She states \" I just can't take it any more. I'm miserable.\" When asked to clarify, she specifically denies suicidal thoughts. She states that she does not want to hurt herself, she simply does not want to feel like this any more. She was being [facility] by [facility] and they took her off her Klonopin and tried her on Buspar and Vistiril and she states that they are not controlling her anxiety any more and she feels like she is suffocating to death. She states that she has an appointment with Dr Noriega on Wednesday and is going to have him restart the Klonopin but she feels like she cannot make it until then. She only wants 3 days worth of medication to get her through.   Review of Systems   Constitutional: Positive for activity change and irritability.   Cardiovascular: Positive for chest pain.   Gastrointestinal: Positive for nausea. "   Psychiatric/Behavioral: Positive for agitation. Negative for suicidal ideas. The patient is nervous/anxious.         Insomnia   All other systems reviewed and are negative.      Past Medical History:   Diagnosis Date   • Anxiety    • Anxiety    • Arthritis    • Bradycardia    • Carotid artery stenosis    • Colon polyp    • Colon polyp    • Depression    • Diabetes mellitus    • Fibromyalgia    • Fibromyalgia    • H/O mammogram 03/10/2011    within normal limits   • Heartburn    • Hyperlipidemia    • Hypertension    • Kidney stones    • PVC (premature ventricular contraction)    • Shortness of breath    • Sleep apnea    • Sleep apnea    • TIA (transient ischemic attack) 09/01/2016   • Vitamin B2 deficiency    • Vitamin C deficiency        Allergies   Allergen Reactions   • Demerol [Meperidine] Itching   • Dilaudid [Hydromorphone] Itching     Dilaudid-5   • Morphine Sulfate [Morphine] Itching       Past Surgical History:   Procedure Laterality Date   • CATARACT EXTRACTION  2016   • CERVICAL SPINE SURGERY     • CHOLECYSTECTOMY     • COLONOSCOPY  01/20/2005   • DILATATION AND CURETTAGE     • ENDOSCOPY  07/01/2011   • GASTRIC BYPASS     • GASTRIC BYPASS     • HEMORRHOIDECTOMY     • HEMORRHOIDECTOMY     • HERNIA REPAIR     • PAP SMEAR  03/20/2012    within normal limits   • TUBAL ABDOMINAL LIGATION         Family History   Problem Relation Age of Onset   • Diabetes Mother    • Hypertension Mother    • Colon polyps Mother    • Diabetes Father    • Other Father      heart problems   • Colon polyps Father    • Diabetes Sister    • Hypertension Sister    • Diabetes Brother    • Hypertension Brother    • Coronary artery disease Brother    • Diabetes Brother    • Hypertension Brother    • Coronary artery disease Brother    • Breast cancer Neg Hx    • Ovarian cancer Neg Hx    • Uterine cancer Neg Hx    • Colon cancer Neg Hx    • Melanoma Neg Hx    • Prostate cancer Neg Hx        Social History     Social History   • Marital  status:      Social History Main Topics   • Smoking status: Current Every Day Smoker     Packs/day: 0.25     Types: Cigarettes   • Smokeless tobacco: Never Used   • Alcohol use No   • Drug use: No   • Sexual activity: Not Currently     Partners: Male     Other Topics Concern   • Not on file           Objective   Physical Exam   Constitutional: She is oriented to person, place, and time. She appears well-developed and well-nourished. No distress.   HENT:   Head: Normocephalic and atraumatic.   Nose: Nose normal.   Eyes: EOM are normal. Pupils are equal, round, and reactive to light. Right eye exhibits no discharge. Left eye exhibits no discharge.   Cardiovascular: Normal rate, regular rhythm and normal heart sounds.  Exam reveals no gallop and no friction rub.    No murmur heard.  Pulmonary/Chest: Effort normal and breath sounds normal. No respiratory distress. She has no wheezes. She has no rales. She exhibits no tenderness.   Neurological: She is alert and oriented to person, place, and time.   Skin: Skin is warm and dry. No rash noted. She is not diaphoretic. No erythema. No pallor.   Psychiatric: Judgment normal. Her mood appears anxious. She is agitated. She is not actively hallucinating. Cognition and memory are normal. She exhibits a depressed mood. She expresses no suicidal plans and no homicidal plans.   Patient states she has a long history of anxiety. She is crying and tearful in the room. She explains that she just wants help and cannot feel like this any more.  She is attentive.       Procedures         ED Course  ED Course      I discussed the patient with Dr Zavala. He agrees to do one day worth of Klonopin and she needs to follow with her psychiatric doctor for here on out. Discussed with the patient and she verbalizes understanding. She will be discharged home in stable condition.             MDM  Number of Diagnoses or Management Options  Anxiety: minor  Panic attacks: minor  Risk of  Complications, Morbidity, and/or Mortality  Presenting problems: minimal  Diagnostic procedures: minimal  Management options: minimal    Patient Progress  Patient progress: stable      Final diagnoses:   Panic attacks   Anxiety            Lisa Gonzalez, APRN  03/25/18 [id number]

## 2018-03-27 ENCOUNTER — TELEPHONE (OUTPATIENT)
Dept: PAIN MANAGEMENT | Age: 63
End: 2018-03-27

## 2018-03-27 ENCOUNTER — HOSPITAL ENCOUNTER (OUTPATIENT)
Dept: PAIN MANAGEMENT | Age: 63
Discharge: HOME | End: 2018-03-27
Payer: MEDICAID

## 2018-03-27 ENCOUNTER — HOSPITAL ENCOUNTER (OUTPATIENT)
Dept: GENERAL RADIOLOGY | Age: 63
Discharge: HOME | End: 2018-03-27
Payer: MEDICAID

## 2018-03-27 VITALS
TEMPERATURE: 98 F | SYSTOLIC BLOOD PRESSURE: 126 MMHG | DIASTOLIC BLOOD PRESSURE: 75 MMHG | RESPIRATION RATE: 18 BRPM | HEIGHT: 66 IN | WEIGHT: 263 LBS | BODY MASS INDEX: 42.27 KG/M2 | OXYGEN SATURATION: 97 % | HEART RATE: 80 BPM

## 2018-03-27 DIAGNOSIS — M25.571 BILATERAL ANKLE PAIN, UNSPECIFIED CHRONICITY: ICD-10-CM

## 2018-03-27 DIAGNOSIS — M25.562 PAIN IN BOTH KNEES, UNSPECIFIED CHRONICITY: ICD-10-CM

## 2018-03-27 DIAGNOSIS — M25.572 BILATERAL ANKLE PAIN, UNSPECIFIED CHRONICITY: ICD-10-CM

## 2018-03-27 DIAGNOSIS — M51.36 DDD (DEGENERATIVE DISC DISEASE), LUMBAR: Primary | ICD-10-CM

## 2018-03-27 DIAGNOSIS — M25.561 PAIN IN BOTH KNEES, UNSPECIFIED CHRONICITY: ICD-10-CM

## 2018-03-27 DIAGNOSIS — M51.36 DDD (DEGENERATIVE DISC DISEASE), LUMBAR: ICD-10-CM

## 2018-03-27 PROCEDURE — 72120 X-RAY BEND ONLY L-S SPINE: CPT

## 2018-03-27 PROCEDURE — 73560 X-RAY EXAM OF KNEE 1 OR 2: CPT

## 2018-03-27 PROCEDURE — 99204 OFFICE O/P NEW MOD 45 MIN: CPT

## 2018-03-27 PROCEDURE — 73610 X-RAY EXAM OF ANKLE: CPT

## 2018-03-27 RX ORDER — BUSPIRONE HYDROCHLORIDE 30 MG/1
30 TABLET ORAL 2 TIMES DAILY
COMMUNITY

## 2018-03-27 RX ORDER — CELECOXIB 100 MG/1
100 CAPSULE ORAL 2 TIMES DAILY
Qty: 60 CAPSULE | Refills: 2 | Status: SHIPPED | OUTPATIENT
Start: 2018-03-27 | End: 2019-05-15 | Stop reason: ALTCHOICE

## 2018-03-27 RX ORDER — ATORVASTATIN CALCIUM 40 MG/1
40 TABLET, FILM COATED ORAL DAILY
COMMUNITY

## 2018-03-27 ASSESSMENT — PAIN SCALES - GENERAL: PAINLEVEL_OUTOF10: 6

## 2018-03-27 ASSESSMENT — PAIN DESCRIPTION - PAIN TYPE: TYPE: CHRONIC PAIN

## 2018-03-27 ASSESSMENT — PAIN DESCRIPTION - PROGRESSION: CLINICAL_PROGRESSION: NOT CHANGED

## 2018-03-27 ASSESSMENT — PAIN DESCRIPTION - ONSET: ONSET: ON-GOING

## 2018-03-27 ASSESSMENT — PAIN DESCRIPTION - ORIENTATION: ORIENTATION: LOWER;LEFT

## 2018-03-27 ASSESSMENT — PAIN DESCRIPTION - LOCATION: LOCATION: BACK;GENERALIZED;SHOULDER

## 2018-03-27 ASSESSMENT — PAIN DESCRIPTION - FREQUENCY: FREQUENCY: CONTINUOUS

## 2018-03-27 NOTE — TELEPHONE ENCOUNTER
Patient called stating that she needed to speak to Jackeline Saavedra because she had seen her this morning in the office and she wants her to prescribe Lyrica for her \"Fibro\" pain. Message forwarded to Jackeline Saavedra. She stated that she prescribed Celebrex when the patient was here and that she also stated that Neurontin makes her dizzy. She stated that she will not be prescribing any other medication at this time, and that the patient is scheduled to follow up with Dr. Deborah Dominguez for injections and that she will need to discuss with him on that day. Returned call to patient. Identified myself and that I was from Dr. Keyana Farrell office. Patient stated she didn't know who that was and that she thought Jackeline Saavedra would call her back. I explained what my role is and that I had given her message to Jackeline Saavedra, and that this is what Jackeline Saavedra has decided. Patient became upset and stated that she thought that \"pain management is so people don't hurt\"  She stated that the Celebrex will hurt her stomach because it hurt her stomach before. I asked if she had let Jackeline Saavedra know this at the office visit.   Explained that medications are not changed in between office visits and that Mey's instruction to her is to discuss her medications with Dr. Deborah Dominguez when she sees him for injections

## 2018-03-27 NOTE — PROGRESS NOTES
Nursing Admission Record    Current Issues / Falls / ER Visits:  New patient with chronic back pain and fibromyalgia which patient reports causes pain all over. Patient currently seeing Orthopedic Surgeon, Dr. Condon, for her left shoulder pain. She has received injections from him in shoulder and will get an MRI next week for that. Patient was previously a patient of Dr. Jael Parikh and was discharged from practice per Amanda Taveras, , due to bad drug screen. Patient reports she took her friend's medication on vacation that she was told was prescription NSAID and it turned out it was not. Percentage of Pain Relief after Last Procedure:  na %    How long lasted:  na     Radiology exams received during the last 12 months: No       When na                                              Where na       Imaging on chart: No         Imaging records requested: No  MRI exams received in the past 2 years:  No  Physical therapy during the last 6 months: No, and patient reports \"will not do since it makes her hurt more\"       When: na                                             Where  na  Labs during the last 12 months: Yes    Education Provided:  [x] Review of [facility] Arechiga  [x] Agreement Review  [] Compliance Issues Discussed    [] Cognitive Behavior Needs [x] Exercise [] Review of Test [] Financial Issues  [x] Tobacco/Alcohol Use [x] Teaching [x] New Patient [] Picture Obtained    Physician Plan:  [] Outgoing Referral  [] Pharmacy Consult  [] Test Ordered   [] Obtained Test Results / Consult Notes  [x] UDS due at next visit, verified per EPIC      [] Suspected Physical Abuse or Suicide Risk assessed - IF YES COMPLETE QUESTIONS BELOW    If any of the following questions are answered yes - contact attending physician for referral:    Has been considering harming self to escape stress, pain problems? [] YES  [] NO  Has a suicide plan?  [] YES  [] NO  Has attempted suicide in the past?   [] YES  [] NO  Has a close friend or family member who committed suicide? [] YES  [] NO    Patient Referred To :      Additional Notes:    Assessment Completed by:  Electronically signed by Braden Brush RN on 3/27/2018 at 10:50 AM

## 2018-03-27 NOTE — H&P
intact  Shoulder Exam: ROM intact to right, limited to left related to report of pain with active ROM  Back Exam: ROM limited by report of pain with active ROM  Hip Exam: ROM intact  Knee exam: report of pain bilaterally with active ROM  Neurologic: alert and oriented X 3, speech clear  CN EXAM: grossly intact  Sensation: grossly intact to touch  Mood and affect: irritable and argumentative, no SI or HI    Assessment:    *     Osteoarthritis, Bilateral Knees (per imaging)  *     Calcaneal Spurs, Right Foot (per imaging)  *     Lumbar DDD - L2-3 only (per imaging)  *     Lumbar Radiculopathy (per patient report)    Plan:   [x]  Patient is to call with any questions or concerns which may arise prior to the next office visit    [x]  Celebrex 100 mg BID with food, #60 - discussed to stop this medication if she experiences any GI symptoms (NIKKY reviewed)        (The patient originally said she could not take Celebrex, but then said this was not true and asked for Celebrex)   [x]  Non-narcotic treatment only per Pebbles Andrew, , due to aberrant UDS with Dr. Carrillo Odor   [x]  Imaging order given to patient - Bilateral Knees, Right Ankle, Lumbar XR (flexion/extension)   [x]  PT - patient refuses   [x]  Procedure scheduled for next visit, (Bilateral Knee Steroid Injections)   [x]  Routine UDS next visit (new patient)   [x]  Referral to Rheumatology / Dr. Caitlin Spencer, sent to scheduling, per patient report of rheumatoid arthritis and request to see specialist    Controlled Substance Monitoring: Discussed possible narcotic pain medication side effects, risk of tolerance and/or dependence, and alternative treatments. Discussed the effects of long term narcotic use. Patient encouraged to set daily goals of exercising and decreasing daily narcotic intake. Reviewed/discussed pain management contract with patient.       Electronically signed by GUMARO Hannah

## 2018-03-29 ENCOUNTER — APPOINTMENT (OUTPATIENT)
Dept: MRI IMAGING | Facility: HOSPITAL | Age: 63
End: 2018-03-29
Attending: ORTHOPAEDIC SURGERY

## 2018-04-02 ENCOUNTER — TELEPHONE (OUTPATIENT)
Dept: PAIN MANAGEMENT | Age: 63
End: 2018-04-02

## 2018-04-05 ENCOUNTER — APPOINTMENT (OUTPATIENT)
Dept: MRI IMAGING | Facility: HOSPITAL | Age: 63
End: 2018-04-05
Attending: ORTHOPAEDIC SURGERY

## 2018-04-12 ENCOUNTER — APPOINTMENT (OUTPATIENT)
Dept: MRI IMAGING | Facility: HOSPITAL | Age: 63
End: 2018-04-12
Attending: ORTHOPAEDIC SURGERY

## 2018-05-08 ENCOUNTER — APPOINTMENT (OUTPATIENT)
Dept: MRI IMAGING | Facility: HOSPITAL | Age: 63
End: 2018-05-08
Attending: ORTHOPAEDIC SURGERY

## 2018-06-04 ENCOUNTER — APPOINTMENT (OUTPATIENT)
Dept: MRI IMAGING | Facility: HOSPITAL | Age: 63
End: 2018-06-04
Attending: ORTHOPAEDIC SURGERY

## 2018-06-25 ENCOUNTER — APPOINTMENT (OUTPATIENT)
Dept: MRI IMAGING | Facility: HOSPITAL | Age: 63
End: 2018-06-25
Attending: ORTHOPAEDIC SURGERY

## 2018-07-12 ENCOUNTER — TELEPHONE (OUTPATIENT)
Dept: PAIN MANAGEMENT | Age: 63
End: 2018-07-12

## 2018-07-12 NOTE — TELEPHONE ENCOUNTER
Patient called stating that she is scheduled for a procedure with Dr. Eda Horowitz on 7/31 and she wants to make the office aware of medication changes by other providers so that they can be included into her record. She was seen as a new patient in March, and does not receive narcotics from this office. Per NIKKY, patient has filled Clonazepam from provider at [facility], and Gabapentin from Dr. Maryan Zuniga, PCP. Returned patient's call to confirm these medications, but no answer. Left voicemail and asked patient to return call to confirm that medications stated are being prescribed by above providers.

## 2018-07-16 ENCOUNTER — APPOINTMENT (OUTPATIENT)
Dept: MRI IMAGING | Facility: HOSPITAL | Age: 63
End: 2018-07-16
Attending: ORTHOPAEDIC SURGERY

## 2018-07-19 ENCOUNTER — TELEPHONE (OUTPATIENT)
Dept: PAIN MANAGEMENT | Age: 63
End: 2018-07-19

## 2018-08-01 ENCOUNTER — HOSPITAL ENCOUNTER (OUTPATIENT)
Dept: MRI IMAGING | Facility: HOSPITAL | Age: 63
Discharge: HOME | End: 2018-08-01
Attending: ORTHOPAEDIC SURGERY | Admitting: ORTHOPAEDIC SURGERY

## 2018-08-01 ENCOUNTER — TELEPHONE (OUTPATIENT)
Dept: PAIN MANAGEMENT | Age: 63
End: 2018-08-01

## 2018-08-01 DIAGNOSIS — M25.512 ACUTE PAIN OF LEFT SHOULDER: ICD-10-CM

## 2018-08-01 PROCEDURE — 73221 MRI JOINT UPR EXTREM W/O DYE: CPT

## 2018-08-24 ENCOUNTER — TRANSCRIBE ORDERS (OUTPATIENT)
Dept: ADMINISTRATIVE | Facility: HOSPITAL | Age: 63
End: 2018-08-24

## 2018-08-24 DIAGNOSIS — Z12.31 ENCOUNTER FOR SCREENING MAMMOGRAM FOR MALIGNANT NEOPLASM OF BREAST: Primary | ICD-10-CM

## 2018-09-10 ENCOUNTER — APPOINTMENT (OUTPATIENT)
Dept: MAMMOGRAPHY | Facility: HOSPITAL | Age: 63
End: 2018-09-10
Attending: FAMILY MEDICINE

## 2018-10-12 ENCOUNTER — APPOINTMENT (OUTPATIENT)
Dept: MAMMOGRAPHY | Facility: HOSPITAL | Age: 63
End: 2018-10-12
Attending: FAMILY MEDICINE

## 2018-11-02 ENCOUNTER — APPOINTMENT (OUTPATIENT)
Dept: MAMMOGRAPHY | Facility: HOSPITAL | Age: 63
End: 2018-11-02
Attending: FAMILY MEDICINE

## 2018-12-07 ENCOUNTER — APPOINTMENT (OUTPATIENT)
Dept: MAMMOGRAPHY | Facility: HOSPITAL | Age: 63
End: 2018-12-07
Attending: FAMILY MEDICINE

## 2018-12-12 ENCOUNTER — OFFICE VISIT (OUTPATIENT)
Dept: RETAIL CLINIC | Facility: CLINIC | Age: 63
End: 2018-12-12

## 2018-12-12 VITALS
HEART RATE: 63 BPM | SYSTOLIC BLOOD PRESSURE: 119 MMHG | DIASTOLIC BLOOD PRESSURE: 63 MMHG | TEMPERATURE: 97.9 F | OXYGEN SATURATION: 99 %

## 2018-12-12 DIAGNOSIS — J20.9 ACUTE BRONCHITIS, UNSPECIFIED ORGANISM: Primary | ICD-10-CM

## 2018-12-12 DIAGNOSIS — H61.22 IMPACTED CERUMEN OF LEFT EAR: ICD-10-CM

## 2018-12-12 PROCEDURE — 99213 OFFICE O/P EST LOW 20 MIN: CPT | Performed by: NURSE PRACTITIONER

## 2018-12-12 PROCEDURE — 69210 REMOVE IMPACTED EAR WAX UNI: CPT | Performed by: NURSE PRACTITIONER

## 2018-12-12 RX ORDER — GABAPENTIN 600 MG/1
800 TABLET ORAL 3 TIMES DAILY
COMMUNITY
Start: 2018-11-29 | End: 2020-02-25

## 2018-12-12 RX ORDER — CEFDINIR 300 MG/1
300 CAPSULE ORAL 2 TIMES DAILY
Qty: 20 CAPSULE | Refills: 0 | Status: SHIPPED | OUTPATIENT
Start: 2018-12-12 | End: 2018-12-22

## 2018-12-12 RX ORDER — CLONAZEPAM 1 MG/1
1 TABLET ORAL 3 TIMES DAILY
COMMUNITY
Start: 2018-12-11

## 2018-12-12 RX ORDER — BENZONATATE 100 MG/1
200 CAPSULE ORAL 3 TIMES DAILY PRN
Qty: 10 CAPSULE | Refills: 0 | Status: SHIPPED | OUTPATIENT
Start: 2018-12-12 | End: 2019-02-11

## 2018-12-12 NOTE — PATIENT INSTRUCTIONS
Increase fluid intake  Warm salt water gargles as needed for sore throat  Do not over suppress cough  If no improvement over next 2-3 days or symptoms worsen, follow up with PCP      Acute Bronchitis, Adult  Acute bronchitis is when air tubes (bronchi) in the lungs suddenly get swollen. The condition can make it hard to breathe. It can also cause these symptoms:  · A cough.  · Coughing up clear, yellow, or green mucus.  · Wheezing.  · Chest congestion.  · Shortness of breath.  · A fever.  · Body aches.  · Chills.  · A sore throat.    Follow these instructions at home:  Medicines  · Take over-the-counter and prescription medicines only as told by your doctor.  · If you were prescribed an antibiotic medicine, take it as told by your doctor. Do not stop taking the antibiotic even if you start to feel better.  General instructions  · Rest.  · Drink enough fluids to keep your pee (urine) clear or pale yellow.  · Avoid smoking and secondhand smoke. If you smoke and you need help quitting, ask your doctor. Quitting will help your lungs heal faster.  · Use an inhaler, cool mist vaporizer, or humidifier as told by your doctor.  · Keep all follow-up visits as told by your doctor. This is important.  How is this prevented?  To lower your risk of getting this condition again:  · Wash your hands often with soap and water. If you cannot use soap and water, use hand .  · Avoid contact with people who have cold symptoms.  · Try not to touch your hands to your mouth, nose, or eyes.  · Make sure to get the flu shot every year.    Contact a doctor if:  · Your symptoms do not get better in 2 weeks.  Get help right away if:  · You cough up blood.  · You have chest pain.  · You have very bad shortness of breath.  · You become dehydrated.  · You faint (pass out) or keep feeling like you are going to pass out.  · You keep throwing up (vomiting).  · You have a very bad headache.  · Your fever or chills gets worse.  This information  is not intended to replace advice given to you by your health care provider. Make sure you discuss any questions you have with your health care provider.  Document Released: 06/05/2009 Document Revised: 07/26/2017 Document Reviewed: 06/07/2017  [employer] Interactive Patient Education © 2018 [employer]

## 2018-12-12 NOTE — PROGRESS NOTES
"Subjective   Taylor Davies is a 63 y.o. female.     Cough   This is a new problem. The current episode started 1 to 4 weeks ago (3 weeks). The problem has been gradually worsening. The cough is non-productive. Associated symptoms include rhinorrhea (mild). Pertinent negatives include no fever. Associated symptoms comments: Mild chest congestion (upper chest area)  Mild runny nose. Risk factors for lung disease include smoking/tobacco exposure. She has tried nothing for the symptoms. Her past medical history is significant for bronchitis and environmental allergies (\"sometimes; not a lot\").    She states she gets bronchitis like this and an antibiotic always makes it better.     The following portions of the patient's history were reviewed and updated as appropriate: allergies, current medications, past family history, past medical history, past social history, past surgical history and problem list.    Review of Systems   Constitutional: Negative for fever.   HENT: Positive for rhinorrhea (mild).    Respiratory: Positive for cough.    Gastrointestinal: Negative for diarrhea, nausea and vomiting.   Allergic/Immunologic: Positive for environmental allergies (\"sometimes; not a lot\").       Objective   Physical Exam   Constitutional: She appears well-developed and well-nourished. She does not appear ill. No distress.   HENT:   Right Ear: External ear normal. Tympanic membrane is not erythematous.   Left Ear: External ear normal. Tympanic membrane is not erythematous (Pink; post ear lavage). An impacted cerumen is present.  Nose: Right sinus exhibits no maxillary sinus tenderness and no frontal sinus tenderness. Left sinus exhibits no maxillary sinus tenderness and no frontal sinus tenderness.   Mouth/Throat: Oropharynx is clear and moist. No oropharyngeal exudate or posterior oropharyngeal erythema.   Neck: Neck supple.   Cardiovascular: Normal rate, regular rhythm and normal heart sounds. Exam reveals no gallop and no " friction rub.   No murmur heard.  Pulmonary/Chest: Effort normal and breath sounds normal. No stridor. No respiratory distress. She has no decreased breath sounds. She has no wheezes. She has no rhonchi. She has no rales.   Lymphadenopathy:     She has no cervical adenopathy.   Neurological: She is alert.   Skin: Skin is warm and dry. She is not diaphoretic.   Psychiatric: She has a normal mood and affect. Her behavior is normal.         Assessment/Plan   Taylor was seen today for uri.    Diagnoses and all orders for this visit:    Acute bronchitis, unspecified organism    Impacted cerumen of left ear    Other orders  -     benzonatate (TESSALON PERLES) 100 MG capsule; Take 2 capsules by mouth 3 (Three) Times a Day As Needed for Cough.  -     cefdinir (OMNICEF) 300 MG capsule; Take 1 capsule by mouth 2 (Two) Times a Day for 10 days.      Lungs sounds clear but cough has been ongoing x 3 weeks.  Will treat with antibiotic and give Tessalon        Increase fluid intake  Warm salt water gargles as needed for sore throat  Do not over suppress cough  If no improvement over next 2-3 days or symptoms worsen, follow up with PCP

## 2018-12-12 NOTE — PROGRESS NOTES
Procedure   Ear Cerumen Removal Instrumentation  Date/Time: 12/12/2018 1:03 PM  Performed by: Laura Bennett APRN  Authorized by: Laura Bennett APRN   Consent: Verbal consent obtained.  Risks and benefits: risks, benefits and alternatives were discussed  Consent given by: patient  Patient understanding: patient states understanding of the procedure being performed  Location details: left ear  Patient tolerance: Patient tolerated the procedure well with no immediate complications  Procedure type: irrigation (With curette assistance)

## 2019-01-07 ENCOUNTER — HOSPITAL ENCOUNTER (OUTPATIENT)
Dept: MAMMOGRAPHY | Facility: HOSPITAL | Age: 64
Discharge: HOME | End: 2019-01-07
Attending: FAMILY MEDICINE

## 2019-01-07 DIAGNOSIS — Z12.31 ENCOUNTER FOR SCREENING MAMMOGRAM FOR MALIGNANT NEOPLASM OF BREAST: ICD-10-CM

## 2019-01-21 ENCOUNTER — APPOINTMENT (OUTPATIENT)
Dept: MAMMOGRAPHY | Facility: HOSPITAL | Age: 64
End: 2019-01-21
Attending: FAMILY MEDICINE

## 2019-02-11 ENCOUNTER — OFFICE VISIT (OUTPATIENT)
Dept: OBSTETRICS AND GYNECOLOGY | Facility: CLINIC | Age: 64
End: 2019-02-11

## 2019-02-11 VITALS
SYSTOLIC BLOOD PRESSURE: 134 MMHG | WEIGHT: 253 LBS | BODY MASS INDEX: 40.66 KG/M2 | DIASTOLIC BLOOD PRESSURE: 72 MMHG | HEIGHT: 66 IN

## 2019-02-11 DIAGNOSIS — F17.200 SMOKER: ICD-10-CM

## 2019-02-11 DIAGNOSIS — N89.8 VAGINAL DRYNESS: ICD-10-CM

## 2019-02-11 DIAGNOSIS — Z01.419 WELL WOMAN EXAM WITH ROUTINE GYNECOLOGICAL EXAM: Primary | ICD-10-CM

## 2019-02-11 PROCEDURE — 99396 PREV VISIT EST AGE 40-64: CPT | Performed by: NURSE PRACTITIONER

## 2019-02-11 PROCEDURE — 87624 HPV HI-RISK TYP POOLED RSLT: CPT | Performed by: NURSE PRACTITIONER

## 2019-02-11 PROCEDURE — G0123 SCREEN CERV/VAG THIN LAYER: HCPCS | Performed by: NURSE PRACTITIONER

## 2019-02-11 NOTE — PROGRESS NOTES
"Gaurav Davies is a 63 y.o. female    Here for yearly check up. Has C/O vaginal dryness.      Gynecologic Exam   The patient's pertinent negatives include no pelvic pain, vaginal bleeding or vaginal discharge. The patient is experiencing no pain. Pertinent negatives include no abdominal pain, anorexia, back pain, chills, constipation, diarrhea, discolored urine, dysuria, fever, flank pain, frequency, headaches, hematuria, joint pain, joint swelling, nausea, painful intercourse, rash, sore throat, urgency or vomiting. She is sexually active. She is postmenopausal.         /72   Ht 167.6 cm (66\")   Wt 115 kg (253 lb)   LMP 02/11/2010 (Approximate)   Breastfeeding? No   BMI 40.84 kg/m²     Outpatient Encounter Medications as of 2/11/2019   Medication Sig Dispense Refill   • atorvastatin (LIPITOR) 40 MG tablet Take 40 mg by mouth every night.     • BusPIRone HCl (BUSPAR PO) Take 15 mg by mouth 3 (three) times a day.     • Cholecalciferol (VITAMIN D3) 5000 UNITS capsule capsule Take 10,000 Units by mouth daily.     • clonazePAM (KlonoPIN) 1 MG tablet      • cloNIDine (CATAPRES) 0.1 MG tablet Take 0.1 mg by mouth 3 (three) times a day.     • clopidogrel (PLAVIX) 75 MG tablet Take 75 mg by mouth daily.     • cyclobenzaprine (FLEXERIL) 5 MG tablet Take 10 mg by mouth 2 (Two) Times a Day As Needed for Muscle Spasms.     • DULoxetine (CYMBALTA) 30 MG capsule Take 90 mg by mouth Daily.     • DULoxetine (CYMBALTA) 60 MG capsule Take 60 mg by mouth daily.     • folic acid-vit B6-vit B12 (FOLTABS) 0.8-10-0.115 MG tablet tablet Take  by mouth Daily.     • gabapentin (NEURONTIN) 600 MG tablet 800 mg.     • Multiple Vitamin (MULTI VITAMIN DAILY PO) Take 1 tablet by mouth daily.     • [DISCONTINUED] benzonatate (TESSALON PERLES) 100 MG capsule Take 2 capsules by mouth 3 (Three) Times a Day As Needed for Cough. 10 capsule 0   • [DISCONTINUED] conjugated estrogens (PREMARIN) 0.625 MG/GM vaginal cream Insert  " into the vagina 2 (two) times a week. Insert 1 gram twice weekly. 1 each 12   • [DISCONTINUED] HYDROcodone-acetaminophen (NORCO)  MG per tablet Take 1 tablet by mouth 3 (three) times a day.     • [DISCONTINUED] metFORMIN (GLUCOPHAGE) 500 MG tablet Take 500 mg by mouth 2 (two) times a day with meals.     • [DISCONTINUED] vitamin B-12 (CYANOCOBALAMIN) 1000 MCG tablet Take 2,000 mcg by mouth daily.       No facility-administered encounter medications on file as of 2/11/2019.        Surgical History  Past Surgical History:   Procedure Laterality Date   • CATARACT EXTRACTION  2016   • CERVICAL SPINE SURGERY     • CHOLECYSTECTOMY     • COLONOSCOPY  01/20/2005   • DILATATION AND CURETTAGE     • ENDOSCOPY  07/01/2011   • GASTRIC BYPASS     • GASTRIC BYPASS     • HEMORRHOIDECTOMY     • HEMORRHOIDECTOMY     • HERNIA REPAIR     • PAP SMEAR  03/20/2012    within normal limits   • TUBAL ABDOMINAL LIGATION         Family History  Family History   Problem Relation Age of Onset   • Diabetes Mother    • Hypertension Mother    • Colon polyps Mother    • Diabetes Father    • Other Father         heart problems   • Colon polyps Father    • Diabetes Sister    • Hypertension Sister    • Diabetes Brother    • Hypertension Brother    • Coronary artery disease Brother    • Diabetes Brother    • Hypertension Brother    • Coronary artery disease Brother    • Breast cancer Neg Hx    • Ovarian cancer Neg Hx    • Uterine cancer Neg Hx    • Colon cancer Neg Hx    • Melanoma Neg Hx    • Prostate cancer Neg Hx        The following portions of the patient's history were reviewed and updated as appropriate: allergies, current medications, past family history, past medical history, past social history, past surgical history and problem list.    Review of Systems   Constitutional: Negative for activity change, appetite change, chills, diaphoresis, fatigue, fever, unexpected weight gain and unexpected weight loss.   HENT: Negative for congestion,  dental problem, drooling, ear discharge, ear pain, facial swelling, hearing loss, mouth sores, nosebleeds, postnasal drip, rhinorrhea, sinus pressure, sneezing, sore throat, tinnitus, trouble swallowing and voice change.    Eyes: Negative for blurred vision, double vision, photophobia, pain, discharge, redness, itching and visual disturbance.   Respiratory: Negative for apnea, cough, choking, chest tightness, shortness of breath, wheezing and stridor.    Cardiovascular: Negative for chest pain, palpitations and leg swelling.   Gastrointestinal: Negative for abdominal distention, abdominal pain, anal bleeding, anorexia, blood in stool, constipation, diarrhea, nausea, rectal pain, vomiting, GERD and indigestion.   Endocrine: Negative for cold intolerance, heat intolerance, polydipsia, polyphagia and polyuria.   Genitourinary: Positive for dyspareunia. Negative for breast discharge, urinary incontinence, decreased libido, decreased urine volume, difficulty urinating, dysuria, flank pain, frequency, genital sores, hematuria, menstrual problem, pelvic pain, urgency, vaginal bleeding, vaginal discharge, vaginal pain, breast lump and breast pain.   Musculoskeletal: Negative for arthralgias, back pain, gait problem, joint pain, joint swelling, myalgias, neck pain, neck stiffness and bursitis.   Skin: Negative for color change, dry skin and rash.   Allergic/Immunologic: Negative for environmental allergies, food allergies and immunocompromised state.   Neurological: Negative for dizziness, tremors, seizures, syncope, facial asymmetry, speech difficulty, weakness, light-headedness, numbness, headache, memory problem and confusion.   Hematological: Negative for adenopathy. Does not bruise/bleed easily.   Psychiatric/Behavioral: Negative for agitation, behavioral problems, decreased concentration, dysphoric mood, hallucinations, self-injury, sleep disturbance, suicidal ideas, negative for hyperactivity, depressed mood and  stress. The patient is not nervous/anxious.        Objective   Physical Exam   Constitutional: She is oriented to person, place, and time. She appears well-developed and well-nourished. No distress.   HENT:   Head: Normocephalic.   Right Ear: External ear normal.   Left Ear: External ear normal.   Nose: Nose normal.   Mouth/Throat: Oropharynx is clear and moist.   Eyes: Conjunctivae are normal. Right eye exhibits no discharge. Left eye exhibits no discharge. No scleral icterus.   Neck: Normal range of motion. Neck supple. Carotid bruit is not present. No tracheal deviation present. No thyromegaly present.   Cardiovascular: Normal rate, regular rhythm, normal heart sounds and intact distal pulses.   No murmur heard.  Pulmonary/Chest: Effort normal and breath sounds normal. No respiratory distress. She has no wheezes. Right breast exhibits no inverted nipple, no mass, no nipple discharge, no skin change and no tenderness. Left breast exhibits no inverted nipple, no mass, no nipple discharge, no skin change and no tenderness. Breasts are symmetrical. There is no breast swelling.   Abdominal: Soft. She exhibits no distension and no mass. There is no tenderness. There is no guarding. No hernia. Hernia confirmed negative in the right inguinal area and confirmed negative in the left inguinal area.   Genitourinary: Rectum normal, vagina normal and uterus normal. Rectal exam shows no mass. No breast tenderness, discharge or bleeding. Pelvic exam was performed with patient supine. There is no rash, tenderness, lesion or injury on the right labia. There is no rash, tenderness, lesion or injury on the left labia. Uterus is not enlarged, not fixed and not tender. Cervix exhibits no motion tenderness, no discharge and no friability. Right adnexum displays no mass, no tenderness and no fullness. Left adnexum displays no mass, no tenderness and no fullness. No erythema, tenderness or bleeding in the vagina. No foreign body in the  vagina. No signs of injury around the vagina. No vaginal discharge found.   Genitourinary Comments:   BSU normal  Urethral meatus  Normal  Perineum  Normal   Musculoskeletal: Normal range of motion. She exhibits no edema or tenderness.   Lymphadenopathy:        Head (right side): No submental, no submandibular, no tonsillar, no preauricular, no posterior auricular and no occipital adenopathy present.        Head (left side): No submental, no submandibular, no tonsillar, no preauricular, no posterior auricular and no occipital adenopathy present.     She has no cervical adenopathy.        Right cervical: No superficial cervical, no deep cervical and no posterior cervical adenopathy present.       Left cervical: No superficial cervical, no deep cervical and no posterior cervical adenopathy present.     She has no axillary adenopathy.        Right: No inguinal adenopathy present.        Left: No inguinal adenopathy present.   Neurological: She is alert and oriented to person, place, and time. Coordination normal.   Skin: Skin is warm and dry. No bruising and no rash noted. She is not diaphoretic. No erythema.   Psychiatric: She has a normal mood and affect. Her behavior is normal. Judgment and thought content normal.   Nursing note and vitals reviewed.      Assessment/Plan   Taylor was seen today for gynecologic exam.    Diagnoses and all orders for this visit:    Well woman exam with routine gynecological exam  Normal GYN exam. Will have lab work at PCP. Encouraged SBE, pt is aware how to do self breast exam and the importance of same. Discussed weight management and importance of maintaining a healthy weight. Discussed Vitamin D intake and the importance of adequate vitamin D for both Bone Health and a healthy immune system.  Discussed Daily exercise and the importance of same, in regards to a healthy heart as well as helping to maintain her weight and improving her mental health.  BMI  40.9.  Colonoscopy is up to  date.  Mammogram and Bone Density will be scheduled at [facility]. Pap smear is not done per [facility]P guidelines.  -     Liquid-based Pap Smear, Screening  -     DEXA Bone Density Axial; Future  -     Mammo Screening Digital Tomosynthesis Bilateral With CAD; Future    Vaginal dryness  Comments:  Encouraged to use Coconut Oil daily.     Smoker  The patient is counseled regarding smoking cessation. She is given information on the consequences of smoking and her health. We discussed the risks of smoking affecting those around her. We discussed CXR and the new CT of the chest for evaluation of her lungs. She is instructed about the smoking cessation class offered by [facility]. We discussed this face to face for 3-5 minutes and she is given a handout regarding the class at [facility].       Patient's Body mass index is 40.84 kg/m². BMI is above normal parameters. Recommendations include: educational material, exercise counseling and nutrition counseling.      Simran Leblanc, APRN  2/11/2019

## 2019-02-11 NOTE — PATIENT INSTRUCTIONS

## 2019-02-14 ENCOUNTER — TELEPHONE (OUTPATIENT)
Dept: OBSTETRICS AND GYNECOLOGY | Facility: CLINIC | Age: 64
End: 2019-02-14

## 2019-02-14 LAB
GEN CATEG CVX/VAG CYTO-IMP: NORMAL
HPV I/H RISK 4 DNA CVX QL PROBE+SIG AMP: NOT DETECTED
LAB AP CASE REPORT: NORMAL
LAB AP GYN ADDITIONAL INFORMATION: NORMAL
LAB AP GYN OTHER FINDINGS: NORMAL
PATH INTERP SPEC-IMP: NORMAL
STAT OF ADQ CVX/VAG CYTO-IMP: NORMAL

## 2019-02-21 ENCOUNTER — APPOINTMENT (OUTPATIENT)
Dept: BONE DENSITY | Facility: HOSPITAL | Age: 64
End: 2019-02-21

## 2019-02-21 ENCOUNTER — APPOINTMENT (OUTPATIENT)
Dept: MAMMOGRAPHY | Facility: HOSPITAL | Age: 64
End: 2019-02-21
Attending: FAMILY MEDICINE

## 2019-03-06 ENCOUNTER — OFFICE VISIT (OUTPATIENT)
Dept: RETAIL CLINIC | Facility: CLINIC | Age: 64
End: 2019-03-06

## 2019-03-06 VITALS
OXYGEN SATURATION: 95 % | HEART RATE: 64 BPM | TEMPERATURE: 98.6 F | SYSTOLIC BLOOD PRESSURE: 104 MMHG | DIASTOLIC BLOOD PRESSURE: 66 MMHG

## 2019-03-06 DIAGNOSIS — J20.9 ACUTE BRONCHITIS, UNSPECIFIED ORGANISM: Primary | ICD-10-CM

## 2019-03-06 PROCEDURE — 99213 OFFICE O/P EST LOW 20 MIN: CPT | Performed by: NURSE PRACTITIONER

## 2019-03-06 RX ORDER — CEFDINIR 300 MG/1
300 CAPSULE ORAL 2 TIMES DAILY
Qty: 20 CAPSULE | Refills: 0 | Status: SHIPPED | OUTPATIENT
Start: 2019-03-06 | End: 2019-03-16

## 2019-03-06 RX ORDER — ALBUTEROL SULFATE 90 UG/1
2 AEROSOL, METERED RESPIRATORY (INHALATION) EVERY 4 HOURS PRN
Qty: 1 INHALER | Refills: 0 | Status: SHIPPED | OUTPATIENT
Start: 2019-03-06 | End: 2019-07-26 | Stop reason: SDUPTHER

## 2019-03-06 NOTE — PROGRESS NOTES
Subjective   Taylor Davies is a 63 y.o. female.     Cough   This is a new problem. The current episode started 1 to 4 weeks ago (1 week). The problem has been gradually worsening. The cough is non-productive. Associated symptoms include rhinorrhea and wheezing. Pertinent negatives include no fever or myalgias. Treatments tried: Coricidin. The treatment provided no relief. Her past medical history is significant for bronchitis. There is no history of COPD.        The following portions of the patient's history were reviewed and updated as appropriate: allergies, current medications, past family history, past medical history, past social history, past surgical history and problem list.    Review of Systems   Constitutional: Negative for fever.   HENT: Positive for congestion and rhinorrhea.    Respiratory: Positive for cough and wheezing.    Gastrointestinal: Negative for diarrhea, nausea and vomiting.   Musculoskeletal: Negative for myalgias.   Neurological: Negative for headache.       Objective   Physical Exam   Constitutional: She appears well-developed and well-nourished. She does not appear ill. No distress.   HENT:   Right Ear: External ear normal. Tympanic membrane is not erythematous.   Left Ear: External ear normal. Tympanic membrane is not erythematous.   Nose: Right sinus exhibits maxillary sinus tenderness and frontal sinus tenderness. Left sinus exhibits maxillary sinus tenderness and frontal sinus tenderness.   Mouth/Throat: No oropharyngeal exudate or posterior oropharyngeal erythema.   Neck: Neck supple.   Cardiovascular: Normal rate, regular rhythm and normal heart sounds. Exam reveals no gallop and no friction rub.   No murmur heard.  Pulmonary/Chest: Effort normal. No stridor. No respiratory distress. She has no wheezes. She has rhonchi (mild; throughout all lobes.  Cleared with cough to all areas except minimal remaining to right lower lobe). She has no rales. She exhibits tenderness (Not abnormal  for patient).   Lymphadenopathy:     She has no cervical adenopathy.   Neurological: She is alert.   Skin: Skin is warm and dry. She is not diaphoretic.   Psychiatric: She has a normal mood and affect. Her behavior is normal.         Assessment/Plan   There are no diagnoses linked to this encounter.

## 2019-03-18 ENCOUNTER — HOSPITAL ENCOUNTER (OUTPATIENT)
Dept: BONE DENSITY | Facility: HOSPITAL | Age: 64
Discharge: HOME | End: 2019-03-18

## 2019-03-18 ENCOUNTER — HOSPITAL ENCOUNTER (OUTPATIENT)
Dept: MAMMOGRAPHY | Facility: HOSPITAL | Age: 64
Discharge: HOME | End: 2019-03-18
Attending: FAMILY MEDICINE | Admitting: FAMILY MEDICINE

## 2019-03-18 DIAGNOSIS — Z01.419 WELL WOMAN EXAM WITH ROUTINE GYNECOLOGICAL EXAM: ICD-10-CM

## 2019-03-18 PROCEDURE — 77063 BREAST TOMOSYNTHESIS BI: CPT

## 2019-03-18 PROCEDURE — 77067 SCR MAMMO BI INCL CAD: CPT

## 2019-04-16 ENCOUNTER — HOSPITAL ENCOUNTER (OUTPATIENT)
Dept: MRI IMAGING | Age: 64
Discharge: HOME | End: 2019-04-16
Payer: MEDICAID

## 2019-04-16 DIAGNOSIS — G89.29 CHRONIC BILATERAL LOW BACK PAIN WITHOUT SCIATICA: ICD-10-CM

## 2019-04-16 DIAGNOSIS — M54.50 CHRONIC BILATERAL LOW BACK PAIN WITHOUT SCIATICA: ICD-10-CM

## 2019-04-16 PROCEDURE — 72148 MRI LUMBAR SPINE W/O DYE: CPT

## 2019-05-15 ENCOUNTER — OFFICE VISIT (OUTPATIENT)
Dept: URGENT CARE | Age: 64
End: 2019-05-15
Payer: MEDICAID

## 2019-05-15 ENCOUNTER — HOSPITAL ENCOUNTER (OUTPATIENT)
Dept: GENERAL RADIOLOGY | Age: 64
Discharge: HOME | End: 2019-05-15
Payer: MEDICAID

## 2019-05-15 VITALS
WEIGHT: 253 LBS | SYSTOLIC BLOOD PRESSURE: 142 MMHG | BODY MASS INDEX: 40.66 KG/M2 | HEART RATE: 67 BPM | DIASTOLIC BLOOD PRESSURE: 63 MMHG | TEMPERATURE: 96.8 F | RESPIRATION RATE: 16 BRPM | HEIGHT: 66 IN | OXYGEN SATURATION: 98 %

## 2019-05-15 DIAGNOSIS — R06.02 SOB (SHORTNESS OF BREATH): Primary | ICD-10-CM

## 2019-05-15 DIAGNOSIS — R06.02 SOB (SHORTNESS OF BREATH): ICD-10-CM

## 2019-05-15 PROCEDURE — 1036F TOBACCO NON-USER: CPT | Performed by: NURSE PRACTITIONER

## 2019-05-15 PROCEDURE — 99213 OFFICE O/P EST LOW 20 MIN: CPT | Performed by: NURSE PRACTITIONER

## 2019-05-15 PROCEDURE — G8427 DOCREV CUR MEDS BY ELIG CLIN: HCPCS | Performed by: NURSE PRACTITIONER

## 2019-05-15 PROCEDURE — 3017F COLORECTAL CA SCREEN DOC REV: CPT | Performed by: NURSE PRACTITIONER

## 2019-05-15 PROCEDURE — 71046 X-RAY EXAM CHEST 2 VIEWS: CPT

## 2019-05-15 PROCEDURE — G8417 CALC BMI ABV UP PARAM F/U: HCPCS | Performed by: NURSE PRACTITIONER

## 2019-05-15 RX ORDER — BENZONATATE 100 MG/1
100 CAPSULE ORAL 3 TIMES DAILY PRN
Qty: 21 CAPSULE | Refills: 0 | Status: SHIPPED | OUTPATIENT
Start: 2019-05-15 | End: 2019-05-22

## 2019-05-15 RX ORDER — DICLOFENAC SODIUM 75 MG/1
75 TABLET, DELAYED RELEASE ORAL 2 TIMES DAILY
COMMUNITY
End: 2020-12-22

## 2019-05-15 RX ORDER — CLONAZEPAM 1 MG/1
TABLET ORAL
COMMUNITY
Start: 2018-12-11

## 2019-05-15 ASSESSMENT — ENCOUNTER SYMPTOMS
COUGH: 1
COLOR CHANGE: 0
CHOKING: 0
ABDOMINAL PAIN: 0
SHORTNESS OF BREATH: 1
EYES NEGATIVE: 1
NAUSEA: 0
WHEEZING: 0
STRIDOR: 0
CHEST TIGHTNESS: 1
APNEA: 0

## 2019-05-15 NOTE — PATIENT INSTRUCTIONS
1. Continue steroid  2. Use tessalon perls   3. Monitor BP and BS  4. Follow up with Dr Izabel Armijo  5.  Return to clinic if symptoms worsen or fail to improve

## 2019-05-15 NOTE — PROGRESS NOTES
(KLONOPIN) 1 MG tablet       PREDNISONE PO Take by mouth 50mg for 2 days, 40mg for 2 days, 30mg for 1 day, 20mg for 1day and then 10mg for 2 days, then 5mg for 2 days. Received on 5/14/19      diclofenac (VOLTAREN) 75 MG EC tablet Take 75 mg by mouth 2 times daily      atorvastatin (LIPITOR) 40 MG tablet Take 40 mg by mouth daily      busPIRone (BUSPAR) 30 MG tablet Take 30 mg by mouth 2 times daily      clopidogrel (PLAVIX) 75 MG tablet Take 75 mg by mouth daily       cyclobenzaprine (FLEXERIL) 10 MG tablet Take 0.5 tablets by mouth 2 times daily as needed for Muscle spasms (Patient taking differently: Take 10 mg by mouth 3 times daily as needed 1/2-1 tablet) 20 tablet 0    DULoxetine (CYMBALTA) 60 MG extended release capsule Take 120 mg by mouth daily       cloNIDine (CATAPRES) 0.1 MG tablet Take 0.1 mg by mouth 4 times daily        No current facility-administered medications for this visit. Allergies   Allergen Reactions    Demerol Hcl [Meperidine] Itching    Morphine Itching    Norco [Hydrocodone-Acetaminophen] Itching       Health Maintenance   Topic Date Due    Hepatitis C screen  1955    Pneumococcal 0-64 years Vaccine (1 of 1 - PPSV23) 03/27/1961    Diabetic foot exam  03/27/1965    Diabetic retinal exam  03/27/1965    HIV screen  03/27/1970    Diabetic microalbuminuria test  03/27/1973    DTaP/Tdap/Td vaccine (1 - Tdap) 03/27/1974    Cervical cancer screen  03/27/1976    Breast cancer screen  03/27/2005    Shingles Vaccine (1 of 2) 03/27/2005    Colon cancer screen colonoscopy  03/27/2005    A1C test (Diabetic or Prediabetic)  09/22/2018    Lipid screen  09/22/2018    Flu vaccine (Season Ended) 09/01/2019       Subjective:     Review of Systems   Constitutional: Negative for activity change, appetite change, chills, fatigue, fever and unexpected weight change. HENT: Positive for congestion. Eyes: Negative.     Respiratory: Positive for cough, chest tightness and

## 2019-06-05 ENCOUNTER — APPOINTMENT (OUTPATIENT)
Dept: BONE DENSITY | Facility: HOSPITAL | Age: 64
End: 2019-06-05

## 2019-06-17 ENCOUNTER — APPOINTMENT (OUTPATIENT)
Dept: BONE DENSITY | Facility: HOSPITAL | Age: 64
End: 2019-06-17

## 2019-07-05 ENCOUNTER — HOSPITAL ENCOUNTER (OUTPATIENT)
Dept: BONE DENSITY | Facility: HOSPITAL | Age: 64
Discharge: HOME | End: 2019-07-05
Admitting: NURSE PRACTITIONER

## 2019-07-05 PROCEDURE — 77080 DXA BONE DENSITY AXIAL: CPT

## 2019-07-16 ENCOUNTER — HOSPITAL ENCOUNTER (OUTPATIENT)
Dept: CT IMAGING | Age: 64
Discharge: HOME | End: 2019-07-16
Payer: MEDICAID

## 2019-07-16 DIAGNOSIS — Z87.891 PERSONAL HISTORY OF TOBACCO USE, PRESENTING HAZARDS TO HEALTH: ICD-10-CM

## 2019-07-16 PROCEDURE — G0297 LDCT FOR LUNG CA SCREEN: HCPCS

## 2019-07-26 ENCOUNTER — OFFICE VISIT (OUTPATIENT)
Dept: RETAIL CLINIC | Facility: CLINIC | Age: 64
End: 2019-07-26

## 2019-07-26 VITALS
OXYGEN SATURATION: 96 % | HEART RATE: 78 BPM | RESPIRATION RATE: 18 BRPM | TEMPERATURE: 97.5 F | DIASTOLIC BLOOD PRESSURE: 68 MMHG | SYSTOLIC BLOOD PRESSURE: 106 MMHG

## 2019-07-26 DIAGNOSIS — J20.9 ACUTE BRONCHITIS, UNSPECIFIED ORGANISM: Primary | ICD-10-CM

## 2019-07-26 PROCEDURE — 99213 OFFICE O/P EST LOW 20 MIN: CPT | Performed by: NURSE PRACTITIONER

## 2019-07-26 RX ORDER — CEFDINIR 300 MG/1
300 CAPSULE ORAL 2 TIMES DAILY
Qty: 20 CAPSULE | Refills: 0 | Status: SHIPPED | OUTPATIENT
Start: 2019-07-26 | End: 2019-08-05

## 2019-07-26 RX ORDER — BENZONATATE 200 MG/1
200 CAPSULE ORAL 3 TIMES DAILY PRN
Qty: 20 CAPSULE | Refills: 0 | Status: ON HOLD | OUTPATIENT
Start: 2019-07-26 | End: 2019-10-26

## 2019-07-26 RX ORDER — DICLOFENAC SODIUM 75 MG/1
75 TABLET, DELAYED RELEASE ORAL 2 TIMES DAILY
COMMUNITY
End: 2019-10-28 | Stop reason: HOSPADM

## 2019-07-26 RX ORDER — ALBUTEROL SULFATE 90 UG/1
2 AEROSOL, METERED RESPIRATORY (INHALATION) EVERY 4 HOURS PRN
Qty: 1 INHALER | Refills: 0 | Status: SHIPPED | OUTPATIENT
Start: 2019-07-26 | End: 2019-10-28 | Stop reason: HOSPADM

## 2019-07-26 NOTE — PATIENT INSTRUCTIONS
Reviewed chest x-ray. She did not realize she has COPD. She prefers to discuss lung scan with Dr. Jett.   Follow up with Dr. Sean Jett. She has appointment around the first week of August.     Restart the albuterol inhaler when coughing or wheezing.   Go to emergency room if symptoms increase or fever     Recommend smoking cessation. She has decreased to 5-6 cigarettes per day. Not smoking or minimal smoking while sick.         Chronic Obstructive Pulmonary Disease  Chronic obstructive pulmonary disease (COPD) is a long-term (chronic) lung problem. When you have COPD, it is hard for air to get in and out of your lungs. Usually the condition gets worse over time, and your lungs will never return to normal. There are things you can do to keep yourself as healthy as possible.  · Your doctor may treat your condition with:  ? Medicines.  ? Oxygen.  ? Lung surgery.  · Your doctor may also recommend:  ? Rehabilitation. This includes steps to make your body work better. It may involve a team of specialists.  ? Quitting smoking, if you smoke.  ? Exercise and changes to your diet.  ? Comfort measures (palliative care).    Follow these instructions at home:  Medicines  · Take over-the-counter and prescription medicines only as told by your doctor.  · Talk to your doctor before taking any cough or allergy medicines. You may need to avoid medicines that cause your lungs to be dry.  Lifestyle  · If you smoke, stop. Smoking makes the problem worse. If you need help quitting, ask your doctor.  · Avoid being around things that make your breathing worse. This may include smoke, chemicals, and fumes.  · Stay active, but remember to rest as well.  · Learn and use tips on how to relax.  · Make sure you get enough sleep. Most adults need at least 7 hours of sleep every night.  · Eat healthy foods. Eat smaller meals more often. Rest before meals.  Controlled breathing  Learn and use tips on how to control your breathing as told by  your doctor. Try:  · Breathing in (inhaling) through your nose for 1 second. Then, pucker your lips and breath out (exhale) through your lips for 2 seconds.  · Putting one hand on your belly (abdomen). Breathe in slowly through your nose for 1 second. Your hand on your belly should move out. Pucker your lips and breathe out slowly through your lips. Your hand on your belly should move in as you breathe out.    Controlled coughing  Learn and use controlled coughing to clear mucus from your lungs. Follow these steps:  1. Lean your head a little forward.  2. Breathe in deeply.  3. Try to hold your breath for 3 seconds.  4. Keep your mouth slightly open while coughing 2 times.  5. Spit any mucus out into a tissue.  6. Rest and do the steps again 1 or 2 times as needed.    General instructions  · Make sure you get all the shots (vaccines) that your doctor recommends. Ask your doctor about a flu shot and a pneumonia shot.  · Use oxygen therapy and pulmonary rehabilitation if told by your doctor. If you need home oxygen therapy, ask your doctor if you should buy a tool to measure your oxygen level (oximeter).  · Make a COPD action plan with your doctor. This helps you to know what to do if you feel worse than usual.  · Manage any other conditions you have as told by your doctor.  · Avoid going outside when it is very hot, cold, or humid.  · Avoid people who have a sickness you can catch (contagious).  · Keep all follow-up visits as told by your doctor. This is important.  Contact a doctor if:  · You cough up more mucus than usual.  · There is a change in the color or thickness of the mucus.  · It is harder to breathe than usual.  · Your breathing is faster than usual.  · You have trouble sleeping.  · You need to use your medicines more often than usual.  · You have trouble doing your normal activities such as getting dressed or walking around the house.  Get help right away if:  · You have shortness of breath while  resting.  · You have shortness of breath that stops you from:  ? Being able to talk.  ? Doing normal activities.  · Your chest hurts for longer than 5 minutes.  · Your skin color is more blue than usual.  · Your pulse oximeter shows that you have low oxygen for longer than 5 minutes.  · You have a fever.  · You feel too tired to breathe normally.  Summary  · Chronic obstructive pulmonary disease (COPD) is a long-term lung problem.  · The way your lungs work will never return to normal. Usually the condition gets worse over time. There are things you can do to keep yourself as healthy as possible.  · Take over-the-counter and prescription medicines only as told by your doctor.  · If you smoke, stop. Smoking makes the problem worse.  This information is not intended to replace advice given to you by your health care provider. Make sure you discuss any questions you have with your health care provider.  Document Released: 06/05/2009 Document Revised: 01/22/2018 Document Reviewed: 01/22/2018  [employer] Interactive Patient Education © 2019 [employer]  Acute Bronchitis, Adult  Acute bronchitis is when air tubes (bronchi) in the lungs suddenly get swollen. The condition can make it hard to breathe. It can also cause these symptoms:  · A cough.  · Coughing up clear, yellow, or green mucus.  · Wheezing.  · Chest congestion.  · Shortness of breath.  · A fever.  · Body aches.  · Chills.  · A sore throat.    Follow these instructions at home:  Medicines  · Take over-the-counter and prescription medicines only as told by your doctor.  · If you were prescribed an antibiotic medicine, take it as told by your doctor. Do not stop taking the antibiotic even if you start to feel better.  General instructions    · Rest.  · Drink enough fluids to keep your pee (urine) pale yellow.  · Avoid smoking and secondhand smoke. If you smoke and you need help quitting, ask your doctor. Quitting will help your lungs heal faster.  · Use an  inhaler, cool mist vaporizer, or humidifier as told by your doctor.  · Keep all follow-up visits as told by your doctor. This is important.  How is this prevented?  To lower your risk of getting this condition again:  · Wash your hands often with soap and water. If you cannot use soap and water, use hand .  · Avoid contact with people who have cold symptoms.  · Try not to touch your hands to your mouth, nose, or eyes.  · Make sure to get the flu shot every year.    Contact a doctor if:  · Your symptoms do not get better in 2 weeks.  Get help right away if:  · You cough up blood.  · You have chest pain.  · You have very bad shortness of breath.  · You become dehydrated.  · You faint (pass out) or keep feeling like you are going to pass out.  · You keep throwing up (vomiting).  · You have a very bad headache.  · Your fever or chills gets worse.  This information is not intended to replace advice given to you by your health care provider. Make sure you discuss any questions you have with your health care provider.  Document Released: 06/05/2009 Document Revised: 08/01/2018 Document Reviewed: 06/07/2017  [employer] Interactive Patient Education © 2019 [employer]

## 2019-07-26 NOTE — PROGRESS NOTES
Chief Complaint   Patient presents with   • Cough     Subjective   Taylor Davies is a 64 y.o. female who presents to the clinic today with complaints cough and sore throat. She feels some mild shortness of breath. She was seen at [facility] in May and was given steroids and benzonatate caps with some improvements, but never really went away. She says she has to have an antibiotic to go away. She feels like she is getting sick with bronchitis more often about every 3 months.   She does not like to take the albuterol because it makes her jittery and increases her heart rate.   Cough   This is a new problem. The current episode started more than 1 month ago. The problem has been gradually worsening. The cough is productive of sputum. Associated symptoms include a sore throat and shortness of breath (occasional mild ). Pertinent negatives include no chest pain, chills, ear congestion, ear pain, fever, headaches, heartburn, hemoptysis, myalgias, nasal congestion, postnasal drip, rash, rhinorrhea, sweats, weight loss or wheezing. Nothing aggravates the symptoms. She has tried oral steroids and prescription cough suppressant for the symptoms. The treatment provided moderate relief. Her past medical history is significant for COPD. There is no history of asthma, bronchiectasis, bronchitis, emphysema, environmental allergies or pneumonia.         Current Outpatient Medications:   •  atorvastatin (LIPITOR) 40 MG tablet, Take 40 mg by mouth every night., Disp: , Rfl:   •  BusPIRone HCl (BUSPAR PO), Take 15 mg by mouth 3 (three) times a day., Disp: , Rfl:   •  Cholecalciferol (VITAMIN D3) 5000 UNITS capsule capsule, Take 10,000 Units by mouth daily., Disp: , Rfl:   •  clonazePAM (KlonoPIN) 1 MG tablet, , Disp: , Rfl:   •  cloNIDine (CATAPRES) 0.1 MG tablet, Take 0.1 mg by mouth 3 (three) times a day., Disp: , Rfl:   •  cyclobenzaprine (FLEXERIL) 5 MG tablet, Take 10 mg by mouth 2 (Two) Times a Day As Needed for Muscle  Spasms., Disp: , Rfl:   •  diclofenac (VOLTAREN) 75 MG EC tablet, Take 75 mg by mouth., Disp: , Rfl:   •  DULoxetine (CYMBALTA) 30 MG capsule, Take 90 mg by mouth Daily., Disp: , Rfl:   •  DULoxetine (CYMBALTA) 60 MG capsule, Take 60 mg by mouth daily., Disp: , Rfl:   •  folic acid-vit B6-vit B12 (FOLTABS) 0.8-10-0.115 MG tablet tablet, Take  by mouth Daily., Disp: , Rfl:   •  gabapentin (NEURONTIN) 600 MG tablet, 800 mg., Disp: , Rfl:   •  Multiple Vitamin (MULTI VITAMIN DAILY PO), Take 1 tablet by mouth daily., Disp: , Rfl:   •  albuterol sulfate  (90 Base) MCG/ACT inhaler, Inhale 2 puffs Every 4 (Four) Hours As Needed for Wheezing or Shortness of Air., Disp: 1 inhaler, Rfl: 0  •  benzonatate (TESSALON) 200 MG capsule, Take 1 capsule by mouth 3 (Three) Times a Day As Needed for Cough., Disp: 20 capsule, Rfl: 0  •  cefdinir (OMNICEF) 300 MG capsule, Take 1 capsule by mouth 2 (Two) Times a Day for 10 days., Disp: 20 capsule, Rfl: 0  •  clopidogrel (PLAVIX) 75 MG tablet, Take 75 mg by mouth daily., Disp: , Rfl:     Allergies:  Demerol [meperidine]; Dilaudid [hydromorphone]; Hydrocodone-acetaminophen; and Morphine sulfate [morphine]    Past Medical History:   Diagnosis Date   • Anxiety    • Anxiety    • Arthritis    • Bradycardia    • Carotid artery stenosis    • Colon polyp    • Colon polyp    • Depression    • Diabetes mellitus (CMS/HCC)    • Fibromyalgia    • Fibromyalgia    • H/O mammogram 03/10/2011    within normal limits   • Heartburn    • Hyperlipidemia    • Hypertension    • Kidney stones    • PVC (premature ventricular contraction)    • Shortness of breath    • Sleep apnea    • Sleep apnea    • TIA (transient ischemic attack) 09/01/2016   • Vitamin B2 deficiency    • Vitamin C deficiency      Past Surgical History:   Procedure Laterality Date   • CATARACT EXTRACTION  2016   • CERVICAL SPINE SURGERY     • CHOLECYSTECTOMY     • COLONOSCOPY  01/20/2005   • DILATATION AND CURETTAGE     • ENDOSCOPY   07/01/2011   • GASTRIC BYPASS     • GASTRIC BYPASS     • HEMORRHOIDECTOMY     • HEMORRHOIDECTOMY     • HERNIA REPAIR     • PAP SMEAR  03/20/2012    within normal limits   • TUBAL ABDOMINAL LIGATION       Family History   Problem Relation Age of Onset   • Diabetes Mother    • Hypertension Mother    • Colon polyps Mother    • Diabetes Father    • Other Father         heart problems   • Colon polyps Father    • Diabetes Sister    • Hypertension Sister    • Diabetes Brother    • Hypertension Brother    • Coronary artery disease Brother    • Diabetes Brother    • Hypertension Brother    • Coronary artery disease Brother    • Breast cancer Neg Hx    • Ovarian cancer Neg Hx    • Uterine cancer Neg Hx    • Colon cancer Neg Hx    • Melanoma Neg Hx    • Prostate cancer Neg Hx      Social History     Tobacco Use   • Smoking status: Current Every Day Smoker     Packs/day: 0.25     Types: Cigarettes   • Smokeless tobacco: Never Used   • Tobacco comment: decreasing amount    Substance Use Topics   • Alcohol use: No   • Drug use: No       Review of Systems  Review of Systems   Constitutional: Negative for chills, fever and weight loss.   HENT: Positive for sore throat. Negative for ear pain, postnasal drip and rhinorrhea.    Respiratory: Positive for cough and shortness of breath (occasional mild ). Negative for hemoptysis and wheezing.    Cardiovascular: Negative for chest pain.   Gastrointestinal: Negative for heartburn.   Musculoskeletal: Negative for myalgias.   Skin: Negative for rash.   Allergic/Immunologic: Negative for environmental allergies.   Neurological: Negative for headaches.       Objective   /68 (BP Location: Left arm, Patient Position: Sitting, Cuff Size: Adult)   Pulse 78   Temp 97.5 °F (36.4 °C) (Tympanic)   Resp 18   LMP 02/11/2010 (Approximate)   SpO2 96%       Physical Exam   Constitutional: She is oriented to person, place, and time. She appears well-developed and well-nourished.   HENT:   Head:  Normocephalic and atraumatic.   Right Ear: External ear normal.   Left Ear: External ear normal.   Nose: Nose normal.   Mouth/Throat: Oropharynx is clear and moist. No oropharyngeal exudate.   Eyes: Pupils are equal, round, and reactive to light.   Neck: Normal range of motion. Neck supple.   Cardiovascular: Normal rate, regular rhythm and normal heart sounds.   Pulmonary/Chest: Effort normal. No stridor. No respiratory distress. She has no wheezes. She has no rales. She exhibits no tenderness.   Diminished breath sounds in bases bilaterally    Lymphadenopathy:     She has no cervical adenopathy.   Neurological: She is alert and oriented to person, place, and time.   Skin: Skin is warm and dry.   Psychiatric: She has a normal mood and affect.   Vitals reviewed.      Assessment/Plan     Taylor was seen today for cough.    Diagnoses and all orders for this visit:    Acute bronchitis, unspecified organism    Other orders  -     benzonatate (TESSALON) 200 MG capsule; Take 1 capsule by mouth 3 (Three) Times a Day As Needed for Cough.  -     cefdinir (OMNICEF) 300 MG capsule; Take 1 capsule by mouth 2 (Two) Times a Day for 10 days.  -     albuterol sulfate  (90 Base) MCG/ACT inhaler; Inhale 2 puffs Every 4 (Four) Hours As Needed for Wheezing or Shortness of Air.    D. She prefers to discuss lung scan with Dr. Jett.   I reviewed lung scan and is negative for any acute process  Chest x-ray in May shows COPD. Reviewed previous chest x-rays with no mention of COPD. She reports she has never been told she has COPD.   She has had similar symptoms with coughing and mild shortness of breath about every 2 months. She does not like the steroids or albuterol inhalers.     Follow up with Dr. Sean Jett. She has appointment around the first week of August.     Restart the albuterol inhaler when coughing or wheezing.   Go to emergency room if symptoms increase or fever     Recommend smoking cessation. She has decreased to  5-6 cigarettes per day. Not smoking or minimal smoking while sick.

## 2019-07-31 ENCOUNTER — APPOINTMENT (OUTPATIENT)
Dept: GENERAL RADIOLOGY | Age: 64
End: 2019-07-31
Payer: MEDICAID

## 2019-07-31 ENCOUNTER — HOSPITAL ENCOUNTER (EMERGENCY)
Age: 64
Discharge: HOME | End: 2019-07-31
Attending: EMERGENCY MEDICINE
Payer: MEDICAID

## 2019-07-31 ENCOUNTER — APPOINTMENT (OUTPATIENT)
Dept: CT IMAGING | Age: 64
End: 2019-07-31
Payer: MEDICAID

## 2019-07-31 VITALS
SYSTOLIC BLOOD PRESSURE: 134 MMHG | OXYGEN SATURATION: 94 % | DIASTOLIC BLOOD PRESSURE: 75 MMHG | TEMPERATURE: 99.1 F | HEART RATE: 72 BPM | RESPIRATION RATE: 26 BRPM | BODY MASS INDEX: 39.7 KG/M2 | HEIGHT: 66 IN | WEIGHT: 247 LBS

## 2019-07-31 DIAGNOSIS — R55 SYNCOPE AND COLLAPSE: Primary | ICD-10-CM

## 2019-07-31 DIAGNOSIS — F41.0 ANXIETY ATTACK: ICD-10-CM

## 2019-07-31 LAB
ALBUMIN SERPL-MCNC: 3.8 G/DL (ref 3.5–5.2)
ALP BLD-CCNC: 80 U/L (ref 35–104)
ALT SERPL-CCNC: 66 U/L (ref 5–33)
ANION GAP SERPL CALCULATED.3IONS-SCNC: 12 MMOL/L (ref 7–19)
AST SERPL-CCNC: 50 U/L (ref 5–32)
BASOPHILS ABSOLUTE: 0.1 K/UL (ref 0–0.2)
BASOPHILS RELATIVE PERCENT: 0.8 % (ref 0–1)
BILIRUB SERPL-MCNC: <0.2 MG/DL (ref 0.2–1.2)
BILIRUBIN URINE: NEGATIVE
BLOOD, URINE: NEGATIVE
BUN BLDV-MCNC: 23 MG/DL (ref 8–23)
CALCIUM SERPL-MCNC: 9.2 MG/DL (ref 8.8–10.2)
CHLORIDE BLD-SCNC: 102 MMOL/L (ref 98–111)
CLARITY: CLEAR
CO2: 25 MMOL/L (ref 22–29)
COLOR: YELLOW
CREAT SERPL-MCNC: 0.6 MG/DL (ref 0.5–0.9)
EOSINOPHILS ABSOLUTE: 0.3 K/UL (ref 0–0.6)
EOSINOPHILS RELATIVE PERCENT: 3.6 % (ref 0–5)
GFR NON-AFRICAN AMERICAN: >60
GLUCOSE BLD-MCNC: 117 MG/DL (ref 74–109)
GLUCOSE URINE: NEGATIVE MG/DL
HCT VFR BLD CALC: 40.8 % (ref 37–47)
HEMOGLOBIN: 13.5 G/DL (ref 12–16)
KETONES, URINE: NEGATIVE MG/DL
LEUKOCYTE ESTERASE, URINE: NEGATIVE
LYMPHOCYTES ABSOLUTE: 2.3 K/UL (ref 1.1–4.5)
LYMPHOCYTES RELATIVE PERCENT: 26.7 % (ref 20–40)
MCH RBC QN AUTO: 34.1 PG (ref 27–31)
MCHC RBC AUTO-ENTMCNC: 33.1 G/DL (ref 33–37)
MCV RBC AUTO: 103 FL (ref 81–99)
MONOCYTES ABSOLUTE: 0.7 K/UL (ref 0–0.9)
MONOCYTES RELATIVE PERCENT: 8.7 % (ref 0–10)
NEUTROPHILS ABSOLUTE: 5 K/UL (ref 1.5–7.5)
NEUTROPHILS RELATIVE PERCENT: 59.5 % (ref 50–65)
NITRITE, URINE: NEGATIVE
PDW BLD-RTO: 14.2 % (ref 11.5–14.5)
PH UA: 7 (ref 5–8)
PLATELET # BLD: 246 K/UL (ref 130–400)
PMV BLD AUTO: 12.3 FL (ref 9.4–12.3)
POTASSIUM SERPL-SCNC: 4 MMOL/L (ref 3.5–5)
PROTEIN UA: NEGATIVE MG/DL
RBC # BLD: 3.96 M/UL (ref 4.2–5.4)
SODIUM BLD-SCNC: 139 MMOL/L (ref 136–145)
SPECIFIC GRAVITY UA: 1.01 (ref 1–1.03)
TOTAL PROTEIN: 6.6 G/DL (ref 6.6–8.7)
TROPONIN: <0.01 NG/ML (ref 0–0.03)
URINE REFLEX TO CULTURE: NORMAL
UROBILINOGEN, URINE: 0.2 E.U./DL
WBC # BLD: 8.4 K/UL (ref 4.8–10.8)

## 2019-07-31 PROCEDURE — 36415 COLL VENOUS BLD VENIPUNCTURE: CPT

## 2019-07-31 PROCEDURE — 70450 CT HEAD/BRAIN W/O DYE: CPT

## 2019-07-31 PROCEDURE — 99285 EMERGENCY DEPT VISIT HI MDM: CPT

## 2019-07-31 PROCEDURE — 80053 COMPREHEN METABOLIC PANEL: CPT

## 2019-07-31 PROCEDURE — 71046 X-RAY EXAM CHEST 2 VIEWS: CPT

## 2019-07-31 PROCEDURE — 84484 ASSAY OF TROPONIN QUANT: CPT

## 2019-07-31 PROCEDURE — 81003 URINALYSIS AUTO W/O SCOPE: CPT

## 2019-07-31 PROCEDURE — 93005 ELECTROCARDIOGRAM TRACING: CPT

## 2019-07-31 PROCEDURE — 99284 EMERGENCY DEPT VISIT MOD MDM: CPT | Performed by: EMERGENCY MEDICINE

## 2019-07-31 PROCEDURE — 85025 COMPLETE CBC W/AUTO DIFF WBC: CPT

## 2019-07-31 ASSESSMENT — ENCOUNTER SYMPTOMS
COUGH: 1
BACK PAIN: 0
ABDOMINAL PAIN: 0

## 2019-07-31 ASSESSMENT — PAIN SCALES - GENERAL: PAINLEVEL_OUTOF10: 4

## 2019-07-31 NOTE — ED NOTES
Patient able to ambulate to bathroom and to ct with no difficulty     Audrey Barney RN  07/31/19 9325

## 2019-07-31 NOTE — ED NOTES
Bed: 05  Expected date:   Expected time:   Means of arrival:   Comments:  EMS     Coco Pearson RN  07/31/19 8168

## 2019-07-31 NOTE — ED PROVIDER NOTES
heart     Fibromyalgia     Hiatal hernia     Hyperlipidemia     Hypertension     Indigestion     Kidney stone     Shingles     Sleep apnea     TIA (transient ischemic attack)          SURGICAL HISTORY       Past Surgical History:   Procedure Laterality Date    APPENDECTOMY      CERVICAL SPINE SURGERY  2002    repair of ruptured disc in neck    CHOLECYSTECTOMY      GASTRIC BYPASS SURGERY      HEMORRHOID SURGERY      HERNIA REPAIR      LITHOTRIPSY      TUBAL LIGATION           CURRENT MEDICATIONS       Discharge Medication List as of 7/31/2019  5:33 PM      CONTINUE these medications which have NOT CHANGED    Details   clonazePAM (KLONOPIN) 1 MG tablet Historical Med      PREDNISONE PO Take by mouth 50mg for 2 days, 40mg for 2 days, 30mg for 1 day, 20mg for 1day and then 10mg for 2 days, then 5mg for 2 days. Received on 5/14/19Historical Med      diclofenac (VOLTAREN) 75 MG EC tablet Take 75 mg by mouth 2 times dailyHistorical Med      atorvastatin (LIPITOR) 40 MG tablet Take 40 mg by mouth dailyHistorical Med      busPIRone (BUSPAR) 30 MG tablet Take 30 mg by mouth 2 times dailyHistorical Med      clopidogrel (PLAVIX) 75 MG tablet Take 75 mg by mouth daily Historical Med      cyclobenzaprine (FLEXERIL) 10 MG tablet Take 0.5 tablets by mouth 2 times daily as needed for Muscle spasms, Disp-20 tablet, R-0Print      DULoxetine (CYMBALTA) 60 MG extended release capsule Take 120 mg by mouth daily Historical Med      cloNIDine (CATAPRES) 0.1 MG tablet Take 0.1 mg by mouth 4 times daily Historical Med             ALLERGIES     Demerol hcl [meperidine]; Dilaudid [hydromorphone hcl]; and Morphine    FAMILY HISTORY     History reviewed. No pertinent family history.        SOCIAL HISTORY       Social History     Socioeconomic History    Marital status: Legally      Spouse name: None    Number of children: None    Years of education: None    Highest education level: None   Occupational History    no deformity. Neurological: She is alert and oriented to person, place, and time. No cranial nerve deficit. She exhibits normal muscle tone. Coordination normal.   Skin: Skin is warm and dry. Psychiatric:   Anxious    Nursing note and vitals reviewed. DIAGNOSTIC RESULTS     EKG: All EKG's are interpreted by the Emergency Department Physician who either signs or Co-signs this chart in the absence of a cardiologist.    sinus    RADIOLOGY:   Non-plain film images such as CT, Ultrasound and MRI are read by the radiologist. Shelli Bolls images are visualized and preliminarily interpreted by the emergency physician with the below findings:        Interpretation per the Radiologist below, if available at the time of this note:    CT Head WO Contrast   Final Result   No acute intracranial abnormality. Left maxillary sinusitis. Above findings are recorded on a digital voice clip in PACS. Signed by Dr Christian Delgado on 7/31/2019 4:05 PM      XR CHEST STANDARD (2 VW)   Final Result   1. No focal infiltrate. 2. Mild bronchial wall thickening, stable. Signed by Dr Tong Parra on 7/31/2019 3:38 PM            ED BEDSIDE ULTRASOUND:   Performed by ED Physician - none    LABS:  Labs Reviewed   CBC WITH AUTO DIFFERENTIAL - Abnormal; Notable for the following components:       Result Value    RBC 3.96 (*)     .0 (*)     MCH 34.1 (*)     All other components within normal limits   COMPREHENSIVE METABOLIC PANEL - Abnormal; Notable for the following components:    Glucose 117 (*)     ALT 66 (*)     AST 50 (*)     All other components within normal limits   TROPONIN   URINE RT REFLEX TO CULTURE       All other labs were within normal range or not returned as of this dictation.     EMERGENCY DEPARTMENT COURSE and DIFFERENTIALDIAGNOSIS/MDM:   Vitals:    Vitals:    07/31/19 1601 07/31/19 1632 07/31/19 1702 07/31/19 1731   BP: 131/62 121/62 131/69 134/75   Pulse:  57 63 72   Resp:  15 16 26   Temp:

## 2019-08-04 LAB
EKG P AXIS: 39 DEGREES
EKG P-R INTERVAL: 180 MS
EKG Q-T INTERVAL: 428 MS
EKG QRS DURATION: 80 MS
EKG QTC CALCULATION (BAZETT): 425 MS
EKG T AXIS: 60 DEGREES

## 2019-08-14 ENCOUNTER — NURSE TRIAGE (OUTPATIENT)
Dept: CALL CENTER | Facility: HOSPITAL | Age: 64
End: 2019-08-14

## 2019-08-14 NOTE — TELEPHONE ENCOUNTER
"Caller asking for neurosurgeons who practice at [facility]. States asking for a friend. Given names of neurosurgeons with office number. Voiced understanding.    Reason for Disposition  • Health Information question, no triage required and triager able to answer question    Additional Information  • Negative: [1] Caller is not with the adult (patient) AND [2] reporting urgent symptoms  • Negative: Lab result questions  • Negative: Medication questions  • Negative: Caller cannot be reached by phone  • Negative: Caller has already spoken to PCP or another triager  • Negative: RN needs further essential information from caller in order to complete triage  • Negative: Requesting regular office appointment  • Negative: [1] Caller requesting NON-URGENT health information AND [2] PCP's office is the best resource    Answer Assessment - Initial Assessment Questions  1. REASON FOR CALL or QUESTION: \"What is your reason for calling today?\" or \"How can I best help you?\" or \"What question do you have that I can help answer?\"      Wanted to know names of neurosurgeons who are with [facility].    Protocols used: INFORMATION ONLY CALL-ADULT-      "

## 2019-08-30 ENCOUNTER — TELEPHONE (OUTPATIENT)
Dept: NEUROLOGY | Age: 64
End: 2019-08-30

## 2019-10-09 PROBLEM — J84.10 CALCIFIED GRANULOMA OF LUNG (HCC): Status: ACTIVE | Noted: 2019-10-09

## 2019-10-09 PROBLEM — Z72.0 TOBACCO USER: Status: ACTIVE | Noted: 2019-10-09

## 2019-10-09 PROBLEM — Z87.891 PERSONAL HISTORY OF NICOTINE DEPENDENCE: Status: ACTIVE | Noted: 2019-10-09

## 2019-10-09 PROBLEM — G47.33 OBSTRUCTIVE SLEEP APNEA: Status: ACTIVE | Noted: 2019-10-09

## 2019-10-26 ENCOUNTER — HOSPITAL ENCOUNTER (INPATIENT)
Facility: HOSPITAL | Age: 64
LOS: 2 days | Discharge: HOME | End: 2019-10-28
Attending: EMERGENCY MEDICINE | Admitting: FAMILY MEDICINE

## 2019-10-26 ENCOUNTER — APPOINTMENT (OUTPATIENT)
Dept: GENERAL RADIOLOGY | Facility: HOSPITAL | Age: 64
End: 2019-10-26

## 2019-10-26 ENCOUNTER — APPOINTMENT (OUTPATIENT)
Dept: CT IMAGING | Facility: HOSPITAL | Age: 64
End: 2019-10-26

## 2019-10-26 DIAGNOSIS — J44.1 COPD EXACERBATION (HCC): ICD-10-CM

## 2019-10-26 DIAGNOSIS — I50.82 BIVENTRICULAR CONGESTIVE HEART FAILURE (HCC): Primary | ICD-10-CM

## 2019-10-26 DIAGNOSIS — R09.02 HYPOXIC: ICD-10-CM

## 2019-10-26 PROBLEM — J96.01 ACUTE RESPIRATORY FAILURE WITH HYPOXIA (HCC): Status: ACTIVE | Noted: 2019-10-26

## 2019-10-26 LAB
ALBUMIN SERPL-MCNC: 3.4 G/DL (ref 3.5–5.2)
ALBUMIN/GLOB SERPL: 1 G/DL
ALP SERPL-CCNC: 81 U/L (ref 39–117)
ALT SERPL W P-5'-P-CCNC: 29 U/L (ref 1–33)
ANION GAP SERPL CALCULATED.3IONS-SCNC: 12 MMOL/L (ref 5–15)
APTT PPP: 31.9 SECONDS (ref 24.1–35)
ARTERIAL PATENCY WRIST A: ABNORMAL
AST SERPL-CCNC: 26 U/L (ref 1–32)
ATMOSPHERIC PRESS: 742 MMHG
BASE EXCESS BLDA CALC-SCNC: 4.2 MMOL/L (ref 0–2)
BASOPHILS # BLD AUTO: 0.04 10*3/MM3 (ref 0–0.2)
BASOPHILS NFR BLD AUTO: 0.4 % (ref 0–1.5)
BDY SITE: ABNORMAL
BILIRUB SERPL-MCNC: 0.5 MG/DL (ref 0.2–1.2)
BODY TEMPERATURE: 37 C
BUN BLD-MCNC: 13 MG/DL (ref 8–23)
BUN/CREAT SERPL: 23.2 (ref 7–25)
CALCIUM SPEC-SCNC: 9.2 MG/DL (ref 8.6–10.5)
CHLORIDE SERPL-SCNC: 104 MMOL/L (ref 98–107)
CO2 SERPL-SCNC: 26 MMOL/L (ref 22–29)
CREAT BLD-MCNC: 0.56 MG/DL (ref 0.57–1)
D DIMER PPP FEU-MCNC: 1.34 MG/L (FEU) (ref 0–0.5)
DEPRECATED RDW RBC AUTO: 49.6 FL (ref 37–54)
EOSINOPHIL # BLD AUTO: 0.46 10*3/MM3 (ref 0–0.4)
EOSINOPHIL NFR BLD AUTO: 4.7 % (ref 0.3–6.2)
ERYTHROCYTE [DISTWIDTH] IN BLOOD BY AUTOMATED COUNT: 13.5 % (ref 12.3–15.4)
GFR SERPL CREATININE-BSD FRML MDRD: 109 ML/MIN/1.73
GLOBULIN UR ELPH-MCNC: 3.5 GM/DL
GLUCOSE BLD-MCNC: 167 MG/DL (ref 65–99)
GLUCOSE BLDC GLUCOMTR-MCNC: 123 MG/DL (ref 70–130)
HCO3 BLDA-SCNC: 27.7 MMOL/L (ref 20–26)
HCT VFR BLD AUTO: 38.3 % (ref 34–46.6)
HGB BLD-MCNC: 12.9 G/DL (ref 12–15.9)
IMM GRANULOCYTES # BLD AUTO: 0.04 10*3/MM3 (ref 0–0.05)
IMM GRANULOCYTES NFR BLD AUTO: 0.4 % (ref 0–0.5)
INR PPP: 1 (ref 0.91–1.09)
LYMPHOCYTES # BLD AUTO: 1.11 10*3/MM3 (ref 0.7–3.1)
LYMPHOCYTES NFR BLD AUTO: 11.4 % (ref 19.6–45.3)
Lab: ABNORMAL
Lab: ABNORMAL
MCH RBC QN AUTO: 33.6 PG (ref 26.6–33)
MCHC RBC AUTO-ENTMCNC: 33.7 G/DL (ref 31.5–35.7)
MCV RBC AUTO: 99.7 FL (ref 79–97)
MODALITY: ABNORMAL
MONOCYTES # BLD AUTO: 0.65 10*3/MM3 (ref 0.1–0.9)
MONOCYTES NFR BLD AUTO: 6.7 % (ref 5–12)
NEUTROPHILS # BLD AUTO: 7.44 10*3/MM3 (ref 1.7–7)
NEUTROPHILS NFR BLD AUTO: 76.4 % (ref 42.7–76)
NOTIFIED BY: ABNORMAL
NOTIFIED WHO: ABNORMAL
NRBC BLD AUTO-RTO: 0 /100 WBC (ref 0–0.2)
NT-PROBNP SERPL-MCNC: 1108 PG/ML (ref 5–900)
PCO2 BLDA: 36.4 MM HG (ref 35–45)
PH BLDA: 7.49 PH UNITS (ref 7.35–7.45)
PLATELET # BLD AUTO: 199 10*3/MM3 (ref 140–450)
PMV BLD AUTO: 12.8 FL (ref 6–12)
PO2 BLDA: 54.6 MM HG (ref 83–108)
POTASSIUM BLD-SCNC: 4.1 MMOL/L (ref 3.5–5.2)
PROT SERPL-MCNC: 6.9 G/DL (ref 6–8.5)
PROTHROMBIN TIME: 13.5 SECONDS (ref 11.9–14.6)
RBC # BLD AUTO: 3.84 10*6/MM3 (ref 3.77–5.28)
SAO2 % BLDCOA: 89.8 % (ref 94–99)
SODIUM BLD-SCNC: 142 MMOL/L (ref 136–145)
TROPONIN T SERPL-MCNC: <0.01 NG/ML (ref 0–0.03)
VENTILATOR MODE: ABNORMAL
WBC NRBC COR # BLD: 9.74 10*3/MM3 (ref 3.4–10.8)

## 2019-10-26 PROCEDURE — 36600 WITHDRAWAL OF ARTERIAL BLOOD: CPT

## 2019-10-26 PROCEDURE — 25010000002 ENOXAPARIN PER 10 MG: Performed by: INTERNAL MEDICINE

## 2019-10-26 PROCEDURE — 80053 COMPREHEN METABOLIC PANEL: CPT | Performed by: EMERGENCY MEDICINE

## 2019-10-26 PROCEDURE — 93010 ELECTROCARDIOGRAM REPORT: CPT | Performed by: INTERNAL MEDICINE

## 2019-10-26 PROCEDURE — 85610 PROTHROMBIN TIME: CPT | Performed by: EMERGENCY MEDICINE

## 2019-10-26 PROCEDURE — 36415 COLL VENOUS BLD VENIPUNCTURE: CPT

## 2019-10-26 PROCEDURE — 71275 CT ANGIOGRAPHY CHEST: CPT

## 2019-10-26 PROCEDURE — 85379 FIBRIN DEGRADATION QUANT: CPT | Performed by: EMERGENCY MEDICINE

## 2019-10-26 PROCEDURE — 71045 X-RAY EXAM CHEST 1 VIEW: CPT

## 2019-10-26 PROCEDURE — 93005 ELECTROCARDIOGRAM TRACING: CPT | Performed by: EMERGENCY MEDICINE

## 2019-10-26 PROCEDURE — 25010000002 FUROSEMIDE PER 20 MG: Performed by: INTERNAL MEDICINE

## 2019-10-26 PROCEDURE — 85025 COMPLETE CBC W/AUTO DIFF WBC: CPT | Performed by: EMERGENCY MEDICINE

## 2019-10-26 PROCEDURE — 82803 BLOOD GASES ANY COMBINATION: CPT

## 2019-10-26 PROCEDURE — 82962 GLUCOSE BLOOD TEST: CPT

## 2019-10-26 PROCEDURE — 85730 THROMBOPLASTIN TIME PARTIAL: CPT | Performed by: EMERGENCY MEDICINE

## 2019-10-26 PROCEDURE — 87040 BLOOD CULTURE FOR BACTERIA: CPT | Performed by: EMERGENCY MEDICINE

## 2019-10-26 PROCEDURE — 83880 ASSAY OF NATRIURETIC PEPTIDE: CPT | Performed by: EMERGENCY MEDICINE

## 2019-10-26 PROCEDURE — 99285 EMERGENCY DEPT VISIT HI MDM: CPT

## 2019-10-26 PROCEDURE — 0 IOPAMIDOL PER 1 ML: Performed by: EMERGENCY MEDICINE

## 2019-10-26 PROCEDURE — 84484 ASSAY OF TROPONIN QUANT: CPT | Performed by: EMERGENCY MEDICINE

## 2019-10-26 PROCEDURE — 25010000002 FUROSEMIDE PER 20 MG: Performed by: EMERGENCY MEDICINE

## 2019-10-26 RX ORDER — ATORVASTATIN CALCIUM 40 MG/1
40 TABLET, FILM COATED ORAL NIGHTLY
Status: DISCONTINUED | OUTPATIENT
Start: 2019-10-26 | End: 2019-10-28 | Stop reason: HOSPADM

## 2019-10-26 RX ORDER — CLONIDINE HYDROCHLORIDE 0.1 MG/1
0.1 TABLET ORAL 4 TIMES DAILY
Status: DISCONTINUED | OUTPATIENT
Start: 2019-10-26 | End: 2019-10-28 | Stop reason: HOSPADM

## 2019-10-26 RX ORDER — SODIUM CHLORIDE 0.9 % (FLUSH) 0.9 %
10 SYRINGE (ML) INJECTION AS NEEDED
Status: DISCONTINUED | OUTPATIENT
Start: 2019-10-26 | End: 2019-10-28 | Stop reason: HOSPADM

## 2019-10-26 RX ORDER — DULOXETIN HYDROCHLORIDE 30 MG/1
90 CAPSULE, DELAYED RELEASE ORAL DAILY
Status: DISCONTINUED | OUTPATIENT
Start: 2019-10-26 | End: 2019-10-28 | Stop reason: HOSPADM

## 2019-10-26 RX ORDER — CLONAZEPAM 1 MG/1
1 TABLET ORAL 3 TIMES DAILY PRN
Status: DISCONTINUED | OUTPATIENT
Start: 2019-10-26 | End: 2019-10-28 | Stop reason: HOSPADM

## 2019-10-26 RX ORDER — CYCLOBENZAPRINE HCL 10 MG
10 TABLET ORAL 3 TIMES DAILY PRN
Status: DISCONTINUED | OUTPATIENT
Start: 2019-10-26 | End: 2019-10-28 | Stop reason: HOSPADM

## 2019-10-26 RX ORDER — CLOPIDOGREL BISULFATE 75 MG/1
75 TABLET ORAL DAILY
Status: DISCONTINUED | OUTPATIENT
Start: 2019-10-26 | End: 2019-10-28 | Stop reason: HOSPADM

## 2019-10-26 RX ORDER — CHOLECALCIFEROL (VITAMIN D3) 125 MCG
10 CAPSULE ORAL NIGHTLY
COMMUNITY

## 2019-10-26 RX ORDER — NAPROXEN 500 MG/1
500 TABLET ORAL 2 TIMES DAILY WITH MEALS
Status: DISCONTINUED | OUTPATIENT
Start: 2019-10-26 | End: 2019-10-28 | Stop reason: HOSPADM

## 2019-10-26 RX ORDER — FUROSEMIDE 10 MG/ML
20 INJECTION INTRAMUSCULAR; INTRAVENOUS DAILY
Status: DISCONTINUED | OUTPATIENT
Start: 2019-10-26 | End: 2019-10-28 | Stop reason: HOSPADM

## 2019-10-26 RX ORDER — SODIUM CHLORIDE 0.9 % (FLUSH) 0.9 %
10 SYRINGE (ML) INJECTION EVERY 12 HOURS SCHEDULED
Status: DISCONTINUED | OUTPATIENT
Start: 2019-10-26 | End: 2019-10-28 | Stop reason: HOSPADM

## 2019-10-26 RX ORDER — DULOXETIN HYDROCHLORIDE 30 MG/1
60 CAPSULE, DELAYED RELEASE ORAL DAILY
Status: DISCONTINUED | OUTPATIENT
Start: 2019-10-26 | End: 2019-10-26

## 2019-10-26 RX ORDER — LANOLIN ALCOHOL/MO/W.PET/CERES
6 CREAM (GRAM) TOPICAL NIGHTLY
Status: DISCONTINUED | OUTPATIENT
Start: 2019-10-26 | End: 2019-10-28 | Stop reason: HOSPADM

## 2019-10-26 RX ORDER — GABAPENTIN 400 MG/1
800 CAPSULE ORAL 3 TIMES DAILY
Status: DISCONTINUED | OUTPATIENT
Start: 2019-10-26 | End: 2019-10-28 | Stop reason: HOSPADM

## 2019-10-26 RX ORDER — FUROSEMIDE 10 MG/ML
40 INJECTION INTRAMUSCULAR; INTRAVENOUS ONCE
Status: COMPLETED | OUTPATIENT
Start: 2019-10-26 | End: 2019-10-26

## 2019-10-26 RX ORDER — ALBUTEROL SULFATE 2.5 MG/3ML
2.5 SOLUTION RESPIRATORY (INHALATION) EVERY 6 HOURS PRN
Status: DISCONTINUED | OUTPATIENT
Start: 2019-10-26 | End: 2019-10-28 | Stop reason: HOSPADM

## 2019-10-26 RX ADMIN — CLONIDINE HYDROCHLORIDE 0.1 MG: 0.1 TABLET ORAL at 17:02

## 2019-10-26 RX ADMIN — NAPROXEN 500 MG: 500 TABLET ORAL at 17:01

## 2019-10-26 RX ADMIN — GABAPENTIN 800 MG: 400 CAPSULE ORAL at 20:23

## 2019-10-26 RX ADMIN — ENOXAPARIN SODIUM 40 MG: 40 INJECTION SUBCUTANEOUS at 17:01

## 2019-10-26 RX ADMIN — IOPAMIDOL 125 ML: 755 INJECTION, SOLUTION INTRAVENOUS at 10:53

## 2019-10-26 RX ADMIN — Medication 6 MG: at 20:23

## 2019-10-26 RX ADMIN — CLONIDINE HYDROCHLORIDE 0.1 MG: 0.1 TABLET ORAL at 20:23

## 2019-10-26 RX ADMIN — ATORVASTATIN CALCIUM 40 MG: 40 TABLET, FILM COATED ORAL at 20:23

## 2019-10-26 RX ADMIN — CLONAZEPAM 1 MG: 1 TABLET ORAL at 17:02

## 2019-10-26 RX ADMIN — CHOLECALCIFEROL CAP 125 MCG (5000 UNIT) 10000 UNITS: 125 CAP at 17:00

## 2019-10-26 RX ADMIN — DULOXETINE HYDROCHLORIDE 90 MG: 30 CAPSULE, DELAYED RELEASE ORAL at 17:00

## 2019-10-26 RX ADMIN — CLOPIDOGREL 75 MG: 75 TABLET, FILM COATED ORAL at 17:00

## 2019-10-26 RX ADMIN — SODIUM CHLORIDE, PRESERVATIVE FREE 10 ML: 5 INJECTION INTRAVENOUS at 20:24

## 2019-10-26 RX ADMIN — FUROSEMIDE 20 MG: 10 INJECTION, SOLUTION INTRAMUSCULAR; INTRAVENOUS at 17:00

## 2019-10-26 RX ADMIN — FUROSEMIDE 40 MG: 10 INJECTION, SOLUTION INTRAVENOUS at 11:29

## 2019-10-26 RX ADMIN — GABAPENTIN 800 MG: 400 CAPSULE ORAL at 17:00

## 2019-10-27 ENCOUNTER — APPOINTMENT (OUTPATIENT)
Dept: CARDIOLOGY | Facility: HOSPITAL | Age: 64
End: 2019-10-27

## 2019-10-27 PROBLEM — J44.1 COPD EXACERBATION (HCC): Status: ACTIVE | Noted: 2019-10-27

## 2019-10-27 LAB
BH CV ECHO MEAS - AO MAX PG (FULL): 2.9 MMHG
BH CV ECHO MEAS - AO MAX PG: 6 MMHG
BH CV ECHO MEAS - AO MEAN PG (FULL): 1 MMHG
BH CV ECHO MEAS - AO MEAN PG: 3 MMHG
BH CV ECHO MEAS - AO ROOT AREA (BSA CORRECTED): 1.5
BH CV ECHO MEAS - AO ROOT AREA: 9.1 CM^2
BH CV ECHO MEAS - AO ROOT DIAM: 3.4 CM
BH CV ECHO MEAS - AO V2 MAX: 122 CM/SEC
BH CV ECHO MEAS - AO V2 MEAN: 86.7 CM/SEC
BH CV ECHO MEAS - AO V2 VTI: 26.4 CM
BH CV ECHO MEAS - AVA(I,A): 2.4 CM^2
BH CV ECHO MEAS - AVA(I,D): 2.4 CM^2
BH CV ECHO MEAS - AVA(V,A): 2.5 CM^2
BH CV ECHO MEAS - AVA(V,D): 2.5 CM^2
BH CV ECHO MEAS - BSA(HAYCOCK): 2.4 M^2
BH CV ECHO MEAS - BSA: 2.2 M^2
BH CV ECHO MEAS - BZI_BMI: 42 KILOGRAMS/M^2
BH CV ECHO MEAS - BZI_METRIC_HEIGHT: 167.6 CM
BH CV ECHO MEAS - BZI_METRIC_WEIGHT: 117.9 KG
BH CV ECHO MEAS - EDV(CUBED): 120.6 ML
BH CV ECHO MEAS - EDV(MOD-SP4): 131 ML
BH CV ECHO MEAS - EDV(TEICH): 115 ML
BH CV ECHO MEAS - EF(CUBED): 69.9 %
BH CV ECHO MEAS - EF(MOD-SP4): 77.6 %
BH CV ECHO MEAS - EF(TEICH): 61.3 %
BH CV ECHO MEAS - ESV(CUBED): 36.3 ML
BH CV ECHO MEAS - ESV(MOD-SP4): 29.3 ML
BH CV ECHO MEAS - ESV(TEICH): 44.5 ML
BH CV ECHO MEAS - FS: 33 %
BH CV ECHO MEAS - IVS/LVPW: 1.3
BH CV ECHO MEAS - IVSD: 0.94 CM
BH CV ECHO MEAS - LA DIMENSION: 4 CM
BH CV ECHO MEAS - LA/AO: 1.2
BH CV ECHO MEAS - LAT PEAK E' VEL: 6.7 CM/SEC
BH CV ECHO MEAS - LV DIASTOLIC VOL/BSA (35-75): 58.6 ML/M^2
BH CV ECHO MEAS - LV MASS(C)D: 138.8 GRAMS
BH CV ECHO MEAS - LV MASS(C)DI: 62.1 GRAMS/M^2
BH CV ECHO MEAS - LV MAX PG: 3 MMHG
BH CV ECHO MEAS - LV MEAN PG: 2 MMHG
BH CV ECHO MEAS - LV SYSTOLIC VOL/BSA (12-30): 13.1 ML/M^2
BH CV ECHO MEAS - LV V1 MAX: 87.2 CM/SEC
BH CV ECHO MEAS - LV V1 MEAN: 63.9 CM/SEC
BH CV ECHO MEAS - LV V1 VTI: 18.3 CM
BH CV ECHO MEAS - LVIDD: 4.9 CM
BH CV ECHO MEAS - LVIDS: 3.3 CM
BH CV ECHO MEAS - LVLD AP4: 7.9 CM
BH CV ECHO MEAS - LVLS AP4: 6.2 CM
BH CV ECHO MEAS - LVOT AREA (M): 3.5 CM^2
BH CV ECHO MEAS - LVOT AREA: 3.5 CM^2
BH CV ECHO MEAS - LVOT DIAM: 2.1 CM
BH CV ECHO MEAS - LVPWD: 0.72 CM
BH CV ECHO MEAS - MED PEAK E' VEL: 6.9 CM/SEC
BH CV ECHO MEAS - MV A MAX VEL: 63.8 CM/SEC
BH CV ECHO MEAS - MV DEC TIME: 0.25 SEC
BH CV ECHO MEAS - MV E MAX VEL: 49 CM/SEC
BH CV ECHO MEAS - MV E/A: 0.77
BH CV ECHO MEAS - RAP SYSTOLE: 5 MMHG
BH CV ECHO MEAS - RVSP: 25.6 MMHG
BH CV ECHO MEAS - SI(AO): 107.2 ML/M^2
BH CV ECHO MEAS - SI(CUBED): 37.7 ML/M^2
BH CV ECHO MEAS - SI(LVOT): 28.3 ML/M^2
BH CV ECHO MEAS - SI(MOD-SP4): 45.5 ML/M^2
BH CV ECHO MEAS - SI(TEICH): 31.5 ML/M^2
BH CV ECHO MEAS - SV(AO): 239.7 ML
BH CV ECHO MEAS - SV(CUBED): 84.3 ML
BH CV ECHO MEAS - SV(LVOT): 63.4 ML
BH CV ECHO MEAS - SV(MOD-SP4): 101.7 ML
BH CV ECHO MEAS - SV(TEICH): 70.5 ML
BH CV ECHO MEAS - TR MAX VEL: 227 CM/SEC
BH CV ECHO MEASUREMENTS AVERAGE E/E' RATIO: 7.21
LEFT ATRIUM VOLUME INDEX: 26.6 ML/M2
LEFT ATRIUM VOLUME: 59.6 CM3
MAXIMAL PREDICTED HEART RATE: 156 BPM
STRESS TARGET HR: 133 BPM

## 2019-10-27 PROCEDURE — 93325 DOPPLER ECHO COLOR FLOW MAPG: CPT | Performed by: INTERNAL MEDICINE

## 2019-10-27 PROCEDURE — 94799 UNLISTED PULMONARY SVC/PX: CPT

## 2019-10-27 PROCEDURE — 25010000002 METHYLPREDNISOLONE PER 40 MG: Performed by: INTERNAL MEDICINE

## 2019-10-27 PROCEDURE — 94640 AIRWAY INHALATION TREATMENT: CPT

## 2019-10-27 PROCEDURE — 93308 TTE F-UP OR LMTD: CPT | Performed by: INTERNAL MEDICINE

## 2019-10-27 PROCEDURE — 93325 DOPPLER ECHO COLOR FLOW MAPG: CPT

## 2019-10-27 PROCEDURE — 25010000002 PERFLUTREN 6.52 MG/ML SUSPENSION: Performed by: FAMILY MEDICINE

## 2019-10-27 PROCEDURE — 25010000002 ENOXAPARIN PER 10 MG: Performed by: INTERNAL MEDICINE

## 2019-10-27 PROCEDURE — 93321 DOPPLER ECHO F-UP/LMTD STD: CPT | Performed by: INTERNAL MEDICINE

## 2019-10-27 PROCEDURE — 93308 TTE F-UP OR LMTD: CPT

## 2019-10-27 PROCEDURE — 25010000002 FUROSEMIDE PER 20 MG: Performed by: INTERNAL MEDICINE

## 2019-10-27 PROCEDURE — 93321 DOPPLER ECHO F-UP/LMTD STD: CPT

## 2019-10-27 PROCEDURE — 94760 N-INVAS EAR/PLS OXIMETRY 1: CPT

## 2019-10-27 RX ORDER — ACETAMINOPHEN 325 MG/1
650 TABLET ORAL EVERY 4 HOURS PRN
Status: DISCONTINUED | OUTPATIENT
Start: 2019-10-27 | End: 2019-10-28 | Stop reason: HOSPADM

## 2019-10-27 RX ORDER — METHYLPREDNISOLONE SODIUM SUCCINATE 40 MG/ML
40 INJECTION, POWDER, LYOPHILIZED, FOR SOLUTION INTRAMUSCULAR; INTRAVENOUS EVERY 12 HOURS
Status: DISCONTINUED | OUTPATIENT
Start: 2019-10-27 | End: 2019-10-28 | Stop reason: HOSPADM

## 2019-10-27 RX ORDER — NICOTINE 21 MG/24HR
1 PATCH, TRANSDERMAL 24 HOURS TRANSDERMAL EVERY 24 HOURS
Status: DISCONTINUED | OUTPATIENT
Start: 2019-10-27 | End: 2019-10-28 | Stop reason: HOSPADM

## 2019-10-27 RX ORDER — IPRATROPIUM BROMIDE AND ALBUTEROL SULFATE 2.5; .5 MG/3ML; MG/3ML
3 SOLUTION RESPIRATORY (INHALATION)
Status: DISCONTINUED | OUTPATIENT
Start: 2019-10-27 | End: 2019-10-28 | Stop reason: HOSPADM

## 2019-10-27 RX ADMIN — ENOXAPARIN SODIUM 40 MG: 40 INJECTION SUBCUTANEOUS at 17:36

## 2019-10-27 RX ADMIN — CLOPIDOGREL 75 MG: 75 TABLET, FILM COATED ORAL at 17:36

## 2019-10-27 RX ADMIN — GABAPENTIN 800 MG: 400 CAPSULE ORAL at 20:01

## 2019-10-27 RX ADMIN — SODIUM CHLORIDE, PRESERVATIVE FREE 10 ML: 5 INJECTION INTRAVENOUS at 20:05

## 2019-10-27 RX ADMIN — ACETAMINOPHEN 650 MG: 325 TABLET, FILM COATED ORAL at 21:29

## 2019-10-27 RX ADMIN — DULOXETINE HYDROCHLORIDE 90 MG: 30 CAPSULE, DELAYED RELEASE ORAL at 08:00

## 2019-10-27 RX ADMIN — CHOLECALCIFEROL CAP 125 MCG (5000 UNIT) 10000 UNITS: 125 CAP at 08:00

## 2019-10-27 RX ADMIN — CLONIDINE HYDROCHLORIDE 0.1 MG: 0.1 TABLET ORAL at 12:16

## 2019-10-27 RX ADMIN — SODIUM CHLORIDE, PRESERVATIVE FREE 10 ML: 5 INJECTION INTRAVENOUS at 10:07

## 2019-10-27 RX ADMIN — METHYLPREDNISOLONE SODIUM SUCCINATE 40 MG: 40 INJECTION, POWDER, FOR SOLUTION INTRAMUSCULAR; INTRAVENOUS at 10:07

## 2019-10-27 RX ADMIN — GABAPENTIN 800 MG: 400 CAPSULE ORAL at 15:46

## 2019-10-27 RX ADMIN — ATORVASTATIN CALCIUM 40 MG: 40 TABLET, FILM COATED ORAL at 20:01

## 2019-10-27 RX ADMIN — NAPROXEN 500 MG: 500 TABLET ORAL at 07:59

## 2019-10-27 RX ADMIN — IPRATROPIUM BROMIDE AND ALBUTEROL SULFATE 3 ML: 2.5; .5 SOLUTION RESPIRATORY (INHALATION) at 21:19

## 2019-10-27 RX ADMIN — IPRATROPIUM BROMIDE AND ALBUTEROL SULFATE 3 ML: 2.5; .5 SOLUTION RESPIRATORY (INHALATION) at 11:36

## 2019-10-27 RX ADMIN — SODIUM CHLORIDE, PRESERVATIVE FREE 10 ML: 5 INJECTION INTRAVENOUS at 08:58

## 2019-10-27 RX ADMIN — METHYLPREDNISOLONE SODIUM SUCCINATE 40 MG: 40 INJECTION, POWDER, FOR SOLUTION INTRAMUSCULAR; INTRAVENOUS at 20:05

## 2019-10-27 RX ADMIN — CLONIDINE HYDROCHLORIDE 0.1 MG: 0.1 TABLET ORAL at 20:01

## 2019-10-27 RX ADMIN — CLONAZEPAM 1 MG: 1 TABLET ORAL at 08:58

## 2019-10-27 RX ADMIN — FUROSEMIDE 20 MG: 10 INJECTION, SOLUTION INTRAMUSCULAR; INTRAVENOUS at 08:58

## 2019-10-27 RX ADMIN — NAPROXEN 500 MG: 500 TABLET ORAL at 17:36

## 2019-10-27 RX ADMIN — Medication 6 MG: at 20:01

## 2019-10-27 RX ADMIN — IPRATROPIUM BROMIDE AND ALBUTEROL SULFATE 3 ML: 2.5; .5 SOLUTION RESPIRATORY (INHALATION) at 15:00

## 2019-10-27 RX ADMIN — GABAPENTIN 800 MG: 400 CAPSULE ORAL at 08:58

## 2019-10-27 RX ADMIN — PERFLUTREN 1.17 MG: 6.52 INJECTION, SUSPENSION INTRAVENOUS at 09:48

## 2019-10-28 VITALS
SYSTOLIC BLOOD PRESSURE: 117 MMHG | DIASTOLIC BLOOD PRESSURE: 57 MMHG | BODY MASS INDEX: 41.21 KG/M2 | HEART RATE: 77 BPM | HEIGHT: 66 IN | RESPIRATION RATE: 20 BRPM | TEMPERATURE: 98.3 F | OXYGEN SATURATION: 96 % | WEIGHT: 256.4 LBS

## 2019-10-28 LAB
ANION GAP SERPL CALCULATED.3IONS-SCNC: 13 MMOL/L (ref 5–15)
BASOPHILS # BLD AUTO: 0.05 10*3/MM3 (ref 0–0.2)
BASOPHILS NFR BLD AUTO: 0.4 % (ref 0–1.5)
BUN BLD-MCNC: 16 MG/DL (ref 8–23)
BUN/CREAT SERPL: 29.1 (ref 7–25)
CALCIUM SPEC-SCNC: 9.6 MG/DL (ref 8.6–10.5)
CHLORIDE SERPL-SCNC: 100 MMOL/L (ref 98–107)
CO2 SERPL-SCNC: 28 MMOL/L (ref 22–29)
CREAT BLD-MCNC: 0.55 MG/DL (ref 0.57–1)
DEPRECATED RDW RBC AUTO: 47.3 FL (ref 37–54)
EOSINOPHIL # BLD AUTO: 0.01 10*3/MM3 (ref 0–0.4)
EOSINOPHIL NFR BLD AUTO: 0.1 % (ref 0.3–6.2)
ERYTHROCYTE [DISTWIDTH] IN BLOOD BY AUTOMATED COUNT: 13.1 % (ref 12.3–15.4)
GFR SERPL CREATININE-BSD FRML MDRD: 111 ML/MIN/1.73
GLUCOSE BLD-MCNC: 172 MG/DL (ref 65–99)
HCT VFR BLD AUTO: 41.8 % (ref 34–46.6)
HGB BLD-MCNC: 14 G/DL (ref 12–15.9)
LYMPHOCYTES # BLD AUTO: 1.01 10*3/MM3 (ref 0.7–3.1)
LYMPHOCYTES NFR BLD AUTO: 8.2 % (ref 19.6–45.3)
MCH RBC QN AUTO: 33.3 PG (ref 26.6–33)
MCHC RBC AUTO-ENTMCNC: 33.5 G/DL (ref 31.5–35.7)
MCV RBC AUTO: 99.3 FL (ref 79–97)
MONOCYTES # BLD AUTO: 0.45 10*3/MM3 (ref 0.1–0.9)
MONOCYTES NFR BLD AUTO: 3.7 % (ref 5–12)
NEUTROPHILS # BLD AUTO: 10.57 10*3/MM3 (ref 1.7–7)
NEUTROPHILS NFR BLD AUTO: 86.2 % (ref 42.7–76)
PLATELET # BLD AUTO: 209 10*3/MM3 (ref 140–450)
PMV BLD AUTO: 13.1 FL (ref 6–12)
POTASSIUM BLD-SCNC: 4.4 MMOL/L (ref 3.5–5.2)
RBC # BLD AUTO: 4.21 10*6/MM3 (ref 3.77–5.28)
SODIUM BLD-SCNC: 141 MMOL/L (ref 136–145)
WBC NRBC COR # BLD: 12.26 10*3/MM3 (ref 3.4–10.8)

## 2019-10-28 PROCEDURE — 94799 UNLISTED PULMONARY SVC/PX: CPT

## 2019-10-28 PROCEDURE — 85025 COMPLETE CBC W/AUTO DIFF WBC: CPT | Performed by: INTERNAL MEDICINE

## 2019-10-28 PROCEDURE — 80048 BASIC METABOLIC PNL TOTAL CA: CPT | Performed by: INTERNAL MEDICINE

## 2019-10-28 PROCEDURE — 25010000002 FUROSEMIDE PER 20 MG: Performed by: INTERNAL MEDICINE

## 2019-10-28 PROCEDURE — 94760 N-INVAS EAR/PLS OXIMETRY 1: CPT

## 2019-10-28 PROCEDURE — 25010000002 METHYLPREDNISOLONE PER 40 MG: Performed by: INTERNAL MEDICINE

## 2019-10-28 RX ORDER — IPRATROPIUM BROMIDE AND ALBUTEROL SULFATE 2.5; .5 MG/3ML; MG/3ML
3 SOLUTION RESPIRATORY (INHALATION) 4 TIMES DAILY PRN
Qty: 360 ML | Refills: 1 | Status: SHIPPED | OUTPATIENT
Start: 2019-10-28 | End: 2021-01-01

## 2019-10-28 RX ORDER — FUROSEMIDE 20 MG/1
20 TABLET ORAL DAILY
Qty: 30 TABLET | Refills: 0 | Status: SHIPPED | OUTPATIENT
Start: 2019-10-28 | End: 2020-02-25

## 2019-10-28 RX ORDER — METHYLPREDNISOLONE 4 MG/1
TABLET ORAL
Qty: 1 EACH | Refills: 0 | Status: SHIPPED | OUTPATIENT
Start: 2019-10-28 | End: 2020-02-25

## 2019-10-28 RX ADMIN — IPRATROPIUM BROMIDE AND ALBUTEROL SULFATE 3 ML: 2.5; .5 SOLUTION RESPIRATORY (INHALATION) at 06:38

## 2019-10-28 RX ADMIN — SODIUM CHLORIDE, PRESERVATIVE FREE 10 ML: 5 INJECTION INTRAVENOUS at 09:57

## 2019-10-28 RX ADMIN — DULOXETINE HYDROCHLORIDE 90 MG: 30 CAPSULE, DELAYED RELEASE ORAL at 09:56

## 2019-10-28 RX ADMIN — IPRATROPIUM BROMIDE AND ALBUTEROL SULFATE 3 ML: 2.5; .5 SOLUTION RESPIRATORY (INHALATION) at 10:53

## 2019-10-28 RX ADMIN — CLONAZEPAM 1 MG: 1 TABLET ORAL at 10:16

## 2019-10-28 RX ADMIN — GABAPENTIN 800 MG: 400 CAPSULE ORAL at 09:59

## 2019-10-28 RX ADMIN — CHOLECALCIFEROL CAP 125 MCG (5000 UNIT) 10000 UNITS: 125 CAP at 09:57

## 2019-10-28 RX ADMIN — NAPROXEN 500 MG: 500 TABLET ORAL at 09:57

## 2019-10-28 RX ADMIN — CLONIDINE HYDROCHLORIDE 0.1 MG: 0.1 TABLET ORAL at 09:57

## 2019-10-28 RX ADMIN — FUROSEMIDE 20 MG: 10 INJECTION, SOLUTION INTRAMUSCULAR; INTRAVENOUS at 09:56

## 2019-10-28 RX ADMIN — METHYLPREDNISOLONE SODIUM SUCCINATE 40 MG: 40 INJECTION, POWDER, FOR SOLUTION INTRAMUSCULAR; INTRAVENOUS at 09:56

## 2019-10-29 ENCOUNTER — READMISSION MANAGEMENT (OUTPATIENT)
Dept: CALL CENTER | Facility: HOSPITAL | Age: 64
End: 2019-10-29

## 2019-10-29 NOTE — OUTREACH NOTE
Prep Survey      Responses   Facility patient discharged from?  [facility]   Is patient eligible?  Yes   Discharge diagnosis  Acute resp. failure with hypoxia, COPD exac. biventricular CHF, ISABEL, depression, DM II, HTN   Does the patient have one of the following disease processes/diagnoses(primary or secondary)?  CHF [and COPD exac.]   Does the patient have Home health ordered?  No   Is there a DME ordered?  Yes   What DME was ordered?  [facility] for nebulizer   Comments regarding appointments  See AVS   Prep survey completed?  Yes          Estefania Carvajal RN

## 2019-10-31 ENCOUNTER — READMISSION MANAGEMENT (OUTPATIENT)
Dept: CALL CENTER | Facility: HOSPITAL | Age: 64
End: 2019-10-31

## 2019-10-31 LAB
BACTERIA SPEC AEROBE CULT: NORMAL
BACTERIA SPEC AEROBE CULT: NORMAL

## 2019-10-31 NOTE — OUTREACH NOTE
CHF Week 1 Survey      Responses   Facility patient discharged from?  [facility]   Does the patient have one of the following disease processes/diagnoses(primary or secondary)?  CHF [COPD]   Is there a successful TCM telephone encounter documented?  No   CHF Week 1 attempt successful?  Yes   Call start time  1615   Call end time  1622   Discharge diagnosis  Acute resp. failure with hypoxia, COPD exac. biventricular CHF, ISABEL, depression, DM II, HTN   Is patient permission given to speak with other caregiver?  No   Meds reviewed with patient/caregiver?  Yes   Is the patient having any side effects they believe may be caused by any medication additions or changes?  No   Does the patient have all medications ordered at discharge?  Yes   Is the patient taking all medications as directed (includes completed medication regime)?  Yes   Does the patient have a primary care provider?   Yes   Does the patient have an appointment with their PCP within 7 days of discharge?  Greater than 7 days   Comments regarding PCP  Sean Jett MD, November 7, 2019 @ 11:10.   Nursing Interventions  Verified appointment date/time/provider   Has the patient kept scheduled appointments due by today?  N/A   Has home health visited the patient within 72 hours of discharge?  N/A   What DME was ordered?  [facility] for nebulizer   Has all DME been delivered?  No   DME comments  Patient states that she did not get a nebulizer because of insurance issues, but that she has a family members that she cleaned and is using.    Psychosocial issues?  No   Did the patient receive a copy of their discharge instructions?  Yes   Nursing interventions  Reviewed instructions with patient   What is the patient's perception of their health status since discharge?  Improving   Nursing interventions  Nurse provided patient education   Is the patient weighing daily?  No   Does the patient have scales?  Yes   Daily weight interventions  Education provided on  importance of daily weight   Is the patient able to teach back Heart Failure diet management?  Yes   Is the patient able to teach back signs and symptoms of worsening condition? (i.e. weight gain, shortness of air, etc.)  Yes   Is the patient/caregiver able to teach back the hierarchy of who to call/visit for symptoms/problems? PCP, Specialist, Home health nurse, Urgent Care, ED, 911  Yes    CHF Week 1 call completed?  Yes          Estefania Bach RN

## 2019-11-07 ENCOUNTER — READMISSION MANAGEMENT (OUTPATIENT)
Dept: CALL CENTER | Facility: HOSPITAL | Age: 64
End: 2019-11-07

## 2019-11-07 NOTE — OUTREACH NOTE
CHF Week 2 Survey      Responses   Facility patient discharged from?  [place]   Does the patient have one of the following disease processes/diagnoses(primary or secondary)?  CHF   Week 2 attempt successful?  Yes   Call start time  1406   Call end time  1409   Meds reviewed with patient/caregiver?  Yes   Is the patient taking all medications as directed (includes completed medication regime)?  Yes   Has the patient kept scheduled appointments due by today?  Yes   Comments  walker as needed   What is the patient's perception of their health status since discharge?  Improving   Is the patient weighing daily?  Yes   Is the patient able to teach back Heart Failure Zones?  Yes [green zone]   CHF Week 2 call completed?  Yes   Wrap up additional comments  no new issues, feeling better          Tamera Carlos, RN

## 2019-11-15 ENCOUNTER — READMISSION MANAGEMENT (OUTPATIENT)
Dept: CALL CENTER | Facility: HOSPITAL | Age: 64
End: 2019-11-15

## 2019-11-15 NOTE — OUTREACH NOTE
CHF Week 3 Survey      Responses   Facility patient discharged from?  [place]   Does the patient have one of the following disease processes/diagnoses(primary or secondary)?  CHF   Week 3 attempt successful?  Yes   Call start time  1120   Call end time  1126   Meds reviewed with patient/caregiver?  Yes   Is the patient taking all medications as directed (includes completed medication regime)?  Yes   Has the patient kept scheduled appointments due by today?  Yes   What is the patient's perception of their health status since discharge?  Improving   Is the patient weighing daily?  Yes   Is the patient able to teach back Heart Failure Zones?  Yes   CHF Week 3 call completed?  Yes   Wrap up additional comments  Patient still waiting for nebulizer to approved.  Insurance required pre authorization with MD notes.          Justina Krishnan RN

## 2019-11-22 ENCOUNTER — READMISSION MANAGEMENT (OUTPATIENT)
Dept: CALL CENTER | Facility: HOSPITAL | Age: 64
End: 2019-11-22

## 2019-11-22 NOTE — DISCHARGE SUMMARY
Hospital Discharge Summary    Taylor Davies  :  1955  MRN:  [id number]    Admit date:  10/26/2019  Discharge date:  10/28/2019    Admitting Physician:    Sean Jett MD    Discharge Diagnoses:      Acute respiratory failure with hypoxia (CMS/[facility])    Hypertension    Type 2 diabetes mellitus (CMS/[facility])    Depression    Obstructive sleep apnea    Tobacco user    Biventricular congestive heart failure (CMS/[facility])    COPD exacerbation (CMS/[facility])      Hospital Course:   She was admitted with SOA, hypoxia. She was diuresed. She was treated for her COPD. She has improved, and is off of O2 on d/c. Her VS are stable. SHe is eating and ambulating.     Discharge Medications:         Discharge Medications      New Medications      Instructions Start Date   furosemide 20 MG tablet  Commonly known as:  LASIX   20 mg, Oral, Daily      ipratropium-albuterol 0.5-2.5 mg/3 ml nebulizer  Commonly known as:  DUO-NEB   3 mL, Nebulization, 4 Times Daily PRN      methylPREDNISolone 4 MG tablet  Commonly known as:  MEDROL (COLE)   Take as directed on package instructions.         Continue These Medications      Instructions Start Date   atorvastatin 40 MG tablet  Commonly known as:  LIPITOR   40 mg, Oral, Nightly      clonazePAM 1 MG tablet  Commonly known as:  KlonoPIN   1 mg, Oral, 3 Times Daily      cloNIDine 0.1 MG tablet  Commonly known as:  CATAPRES   0.1 mg, Oral, 4 Times Daily      clopidogrel 75 MG tablet  Commonly known as:  PLAVIX   75 mg, Oral, Daily      cyclobenzaprine 5 MG tablet  Commonly known as:  FLEXERIL   10 mg, Oral, 3 Times Daily PRN      DULoxetine 60 MG capsule  Commonly known as:  CYMBALTA   60 mg, Oral, Daily, Take along with the 30 MG to equal 90 MG      DULoxetine 30 MG capsule  Commonly known as:  CYMBALTA   30 mg, Oral, Daily, Take along with the 60 MG to equal 90 MG      folic acid-vit B6-vit B12 0.8-10-0.115 MG tablet tablet  Commonly known as:  FOLTABS   1 tablet, Oral, Daily      gabapentin 600  MG tablet  Commonly known as:  NEURONTIN   800 mg, Oral, 3 Times Daily      melatonin 5 MG tablet tablet   10 mg, Oral, Nightly      MULTI VITAMIN DAILY PO   1 tablet, Oral, Daily      vitamin D3 125 MCG (5000 UT) capsule capsule   10,000 Units, Oral, Daily         Stop These Medications    albuterol sulfate  (90 Base) MCG/ACT inhaler  Commonly known as:  PROVENTIL HFA;VENTOLIN HFA;PROAIR HFA     diclofenac 75 MG EC tablet  Commonly known as:  VOLTAREN            Consults:  none    Significant Diagnostic Studies:  See complete admission record      Disposition:  Home in stable condition  Follow up with Sean Jett MD in 1-2 weeks.    Diet: as tolerated    Activity: as tolerated    Special Instructions: none      The patient or family are to call or return if there are any problems, questions, concerns or change in her condition.     Signed:  Sean Jett MD MD  11/22/2019, 2:04 PM

## 2019-11-22 NOTE — OUTREACH NOTE
CHF Week 4 Survey      Responses   Facility patient discharged from?  [place]   Does the patient have one of the following disease processes/diagnoses(primary or secondary)?  CHF   Week 4 attempt successful?  Yes   Call start time  1601   Call end time  1618   Discharge diagnosis  Acute resp. failure with hypoxia, COPD exac. biventricular CHF, ISABEL, depression, DM II, HTN   Is patient permission given to speak with other caregiver?  Yes   List who call center can speak with  , Leonidas May   Person spoke with today (if not patient) and relationship     Meds reviewed with patient/caregiver?  Yes   Is the patient having any side effects they believe may be caused by any medication additions or changes?  No   Is the patient taking all medications as directed (includes completed medication regime)?  No [Patient unable to use nebulizer medication, no nebulizer to use. ]   Has the patient kept scheduled appointments due by today?  Yes   Is the patient still receiving Home Health Services?  N/A   DME comments   states that patient never received ordered nebulizer machine.    Psychosocial issues?  No   Notified Case Management  DME [Dr's notes with addendum needed to obtain neb machine. ]   Comments  [facility] DME rep states that machine was denied due to not receiving Dr notes that states patient would benefit from nebulizer machine.    What is the patient's perception of their health status since discharge?  Improving   Is the patient able to teach back signs and symptoms of worsening condition? (i.e. weight gain, shortness of air, etc.)  Yes   Is the patient/caregiver able to teach back the hierarchy of who to call/visit for symptoms/problems? PCP, Specialist, Home health nurse, Urgent Care, ED, 911  Yes   Week 4 Call Completed?  Yes   Would the patient like one additional call?  Yes          Estefania Bach RN

## 2019-11-25 PROBLEM — J44.1 COPD EXACERBATION (HCC): Status: RESOLVED | Noted: 2019-10-27 | Resolved: 2019-11-25

## 2019-11-25 PROBLEM — R91.8 GROUND GLASS OPACITY PRESENT ON IMAGING OF LUNG: Status: ACTIVE | Noted: 2019-11-25

## 2019-11-25 PROBLEM — J96.01 ACUTE RESPIRATORY FAILURE WITH HYPOXIA (HCC): Status: RESOLVED | Noted: 2019-10-26 | Resolved: 2019-11-25

## 2019-11-27 NOTE — OUTREACH NOTE
Case Management Call Center Follow-up      Responses   [facility] Call Center Tracking Reason?  No DME   Has the Call Center Case Management Follow-up issue been resolved?  Yes   Follow-up Comments  Call placed to Dr Jett office request notes for nebulizer machine be sent to [facility].          Deborah Escobar RN    11/27/2019, 12:00 PM

## 2019-11-30 ENCOUNTER — READMISSION MANAGEMENT (OUTPATIENT)
Dept: CALL CENTER | Facility: HOSPITAL | Age: 64
End: 2019-11-30

## 2019-11-30 NOTE — OUTREACH NOTE
CHF Week 5 Survey      Responses   Facility patient discharged from?  [place]   Does the patient have one of the following disease processes/diagnoses(primary or secondary)?  CHF   Week 5 attempt successful?  Yes   Call start time  1520   Call end time  1528   Discharge diagnosis  Acute resp. failure with hypoxia, COPD exac. biventricular CHF, ISABEL, depression, DM II, HTN   Meds reviewed with patient/caregiver?  Yes   Is the patient taking all medications as directed (includes completed medication regime)?  No   Nursing Interventions  -- [still does not have nebulizer r/t the MD has not sent in the paperwork to authorize the neb for home, pt is upset and concerned that she needs the nebs at home. ]   Has the patient kept scheduled appointments due by today?  Yes   Is the patient still receiving Home Health Services?  N/A   Psychosocial issues?  No   Comments  Pt reports that her MD still has not given [employer] the information for them to process the nebulizer.    What is the patient's perception of their health status since discharge?  Returned to baseline/stable   Is the patient weighing daily?  Yes   Is the patient able to teach back signs and symptoms of worsening condition? (i.e. weight gain, shortness of air, etc.)  Yes   Graduated  Yes   Did the patient feel the follow up calls were helpful during their recovery period?  Yes   Was the number of calls appropriate?  Yes   Wrap up additional comments  Patient still waiting for nebulizer to approved.  Insurance required pre authorization with MD santizo.          Rupali Powell RN

## 2020-01-01 ENCOUNTER — LAB (OUTPATIENT)
Dept: LAB | Facility: HOSPITAL | Age: 65
End: 2020-01-01

## 2020-01-01 ENCOUNTER — TELEPHONE (OUTPATIENT)
Dept: GASTROENTEROLOGY | Facility: CLINIC | Age: 65
End: 2020-01-01

## 2020-01-01 ENCOUNTER — TRANSCRIBE ORDERS (OUTPATIENT)
Dept: LAB | Facility: HOSPITAL | Age: 65
End: 2020-01-01

## 2020-01-01 ENCOUNTER — TRANSCRIBE ORDERS (OUTPATIENT)
Dept: ADMINISTRATIVE | Facility: HOSPITAL | Age: 65
End: 2020-01-01

## 2020-01-01 ENCOUNTER — APPOINTMENT (OUTPATIENT)
Dept: LAB | Facility: HOSPITAL | Age: 65
End: 2020-01-01

## 2020-01-01 ENCOUNTER — APPOINTMENT (OUTPATIENT)
Dept: MAMMOGRAPHY | Facility: HOSPITAL | Age: 65
End: 2020-01-01

## 2020-01-01 ENCOUNTER — NURSE TRIAGE (OUTPATIENT)
Dept: CALL CENTER | Facility: HOSPITAL | Age: 65
End: 2020-01-01

## 2020-01-01 ENCOUNTER — HOSPITAL ENCOUNTER (OUTPATIENT)
Dept: GENERAL RADIOLOGY | Facility: HOSPITAL | Age: 65
Discharge: HOME | End: 2020-10-12
Admitting: FAMILY MEDICINE

## 2020-01-01 ENCOUNTER — TELEMEDICINE (OUTPATIENT)
Dept: GASTROENTEROLOGY | Facility: CLINIC | Age: 65
End: 2020-01-01

## 2020-01-01 ENCOUNTER — APPOINTMENT (OUTPATIENT)
Dept: PULMONOLOGY | Facility: HOSPITAL | Age: 65
End: 2020-01-01

## 2020-01-01 ENCOUNTER — PREP FOR SURGERY (OUTPATIENT)
Dept: OTHER | Facility: HOSPITAL | Age: 65
End: 2020-01-01

## 2020-01-01 DIAGNOSIS — R15.9 INCONTINENCE OF FECES, UNSPECIFIED FECAL INCONTINENCE TYPE: Primary | ICD-10-CM

## 2020-01-01 DIAGNOSIS — I10 ESSENTIAL HYPERTENSION: ICD-10-CM

## 2020-01-01 DIAGNOSIS — R11.0 NAUSEA: ICD-10-CM

## 2020-01-01 DIAGNOSIS — Z01.818 PREOP TESTING: Primary | ICD-10-CM

## 2020-01-01 DIAGNOSIS — Z86.010 HISTORY OF COLON POLYPS: Primary | ICD-10-CM

## 2020-01-01 DIAGNOSIS — Z12.31 ENCOUNTER FOR SCREENING MAMMOGRAM FOR MALIGNANT NEOPLASM OF BREAST: Primary | ICD-10-CM

## 2020-01-01 DIAGNOSIS — Z01.818 PRE-OP TESTING: Primary | ICD-10-CM

## 2020-01-01 DIAGNOSIS — Z83.71 FAMILY HISTORY OF POLYPS IN THE COLON: ICD-10-CM

## 2020-01-01 DIAGNOSIS — Z01.818 PREOPERATIVE TESTING: ICD-10-CM

## 2020-01-01 DIAGNOSIS — Z83.71 FAMILY HX COLONIC POLYPS: ICD-10-CM

## 2020-01-01 DIAGNOSIS — Z86.010 HX OF COLONIC POLYP: ICD-10-CM

## 2020-01-01 DIAGNOSIS — Z01.818 PREOPERATIVE TESTING: Primary | ICD-10-CM

## 2020-01-01 DIAGNOSIS — J44.9 OBSTRUCTIVE CHRONIC BRONCHITIS WITHOUT EXACERBATION (HCC): Primary | ICD-10-CM

## 2020-01-01 LAB
COVID LABCORP PRIORITY: NORMAL
SARS-COV-2 RNA RESP QL NAA+PROBE: NOT DETECTED

## 2020-01-01 PROCEDURE — U0003 INFECTIOUS AGENT DETECTION BY NUCLEIC ACID (DNA OR RNA); SEVERE ACUTE RESPIRATORY SYNDROME CORONAVIRUS 2 (SARS-COV-2) (CORONAVIRUS DISEASE [COVID-19]), AMPLIFIED PROBE TECHNIQUE, MAKING USE OF HIGH THROUGHPUT TECHNOLOGIES AS DESCRIBED BY CMS-2020-01-R: HCPCS | Performed by: INTERNAL MEDICINE

## 2020-01-01 PROCEDURE — C9803 HOPD COVID-19 SPEC COLLECT: HCPCS | Performed by: INTERNAL MEDICINE

## 2020-01-01 PROCEDURE — C9803 HOPD COVID-19 SPEC COLLECT: HCPCS | Performed by: FAMILY MEDICINE

## 2020-01-01 PROCEDURE — U0003 INFECTIOUS AGENT DETECTION BY NUCLEIC ACID (DNA OR RNA); SEVERE ACUTE RESPIRATORY SYNDROME CORONAVIRUS 2 (SARS-COV-2) (CORONAVIRUS DISEASE [COVID-19]), AMPLIFIED PROBE TECHNIQUE, MAKING USE OF HIGH THROUGHPUT TECHNOLOGIES AS DESCRIBED BY CMS-2020-01-R: HCPCS | Performed by: PAIN MEDICINE

## 2020-01-01 PROCEDURE — 71046 X-RAY EXAM CHEST 2 VIEWS: CPT

## 2020-01-01 PROCEDURE — C9803 HOPD COVID-19 SPEC COLLECT: HCPCS

## 2020-01-01 PROCEDURE — U0003 INFECTIOUS AGENT DETECTION BY NUCLEIC ACID (DNA OR RNA); SEVERE ACUTE RESPIRATORY SYNDROME CORONAVIRUS 2 (SARS-COV-2) (CORONAVIRUS DISEASE [COVID-19]), AMPLIFIED PROBE TECHNIQUE, MAKING USE OF HIGH THROUGHPUT TECHNOLOGIES AS DESCRIBED BY CMS-2020-01-R: HCPCS | Performed by: FAMILY MEDICINE

## 2020-01-01 PROCEDURE — 99213 OFFICE O/P EST LOW 20 MIN: CPT | Performed by: NURSE PRACTITIONER

## 2020-01-01 RX ORDER — POLYETHYLENE GLYCOL 3350, SODIUM SULFATE ANHYDROUS, SODIUM BICARBONATE, SODIUM CHLORIDE, POTASSIUM CHLORIDE 236; 22.74; 6.74; 5.86; 2.97 G/4L; G/4L; G/4L; G/4L; G/4L
4 POWDER, FOR SOLUTION ORAL ONCE
Qty: 4000 ML | Refills: 0 | Status: SHIPPED | OUTPATIENT
Start: 2020-01-01 | End: 2020-01-01

## 2020-02-21 ENCOUNTER — OFFICE VISIT (OUTPATIENT)
Dept: RETAIL CLINIC | Facility: CLINIC | Age: 65
End: 2020-02-21

## 2020-02-21 VITALS
RESPIRATION RATE: 18 BRPM | DIASTOLIC BLOOD PRESSURE: 64 MMHG | TEMPERATURE: 98.1 F | HEART RATE: 87 BPM | OXYGEN SATURATION: 98 % | SYSTOLIC BLOOD PRESSURE: 106 MMHG

## 2020-02-21 DIAGNOSIS — J01.40 ACUTE PANSINUSITIS, RECURRENCE NOT SPECIFIED: Primary | ICD-10-CM

## 2020-02-21 PROCEDURE — 99213 OFFICE O/P EST LOW 20 MIN: CPT | Performed by: NURSE PRACTITIONER

## 2020-02-21 RX ORDER — ATORVASTATIN CALCIUM 40 MG/1
40 TABLET, FILM COATED ORAL
COMMUNITY
End: 2021-01-01

## 2020-02-21 RX ORDER — CLOPIDOGREL BISULFATE 75 MG/1
75 TABLET ORAL
COMMUNITY
End: 2020-02-25

## 2020-02-21 RX ORDER — AMOXICILLIN AND CLAVULANATE POTASSIUM 875; 125 MG/1; MG/1
1 TABLET, FILM COATED ORAL 2 TIMES DAILY
Qty: 20 TABLET | Refills: 0 | Status: SHIPPED | OUTPATIENT
Start: 2020-02-21 | End: 2020-03-02

## 2020-02-21 RX ORDER — CLONAZEPAM 1 MG/1
1 TABLET ORAL 3 TIMES DAILY PRN
COMMUNITY
Start: 2018-12-11 | End: 2020-02-25

## 2020-02-21 RX ORDER — CLONIDINE HYDROCHLORIDE 0.1 MG/1
0.1 TABLET ORAL
COMMUNITY
End: 2020-02-25

## 2020-02-21 RX ORDER — BUSPIRONE HYDROCHLORIDE 7.5 MG/1
7.5 TABLET ORAL 2 TIMES DAILY
COMMUNITY
Start: 2020-02-20

## 2020-02-21 RX ORDER — FLUTICASONE PROPIONATE 50 MCG
2 SPRAY, SUSPENSION (ML) NASAL DAILY
Qty: 18.2 ML | Refills: 0 | Status: ON HOLD | OUTPATIENT
Start: 2020-02-21 | End: 2021-01-01

## 2020-02-21 RX ORDER — DULOXETIN HYDROCHLORIDE 60 MG/1
90 CAPSULE, DELAYED RELEASE ORAL
COMMUNITY

## 2020-02-21 RX ORDER — GABAPENTIN 800 MG/1
800 TABLET ORAL 3 TIMES DAILY
COMMUNITY

## 2020-02-21 RX ORDER — CYCLOBENZAPRINE HCL 10 MG
10 TABLET ORAL 3 TIMES DAILY PRN
COMMUNITY
Start: 2017-06-11

## 2020-02-21 RX ORDER — OXYCODONE AND ACETAMINOPHEN 7.5; 325 MG/1; MG/1
1 TABLET ORAL EVERY 8 HOURS PRN
COMMUNITY
Start: 2020-02-20

## 2020-02-21 NOTE — PATIENT INSTRUCTIONS
Drink plenty of fluids   Acetaminophen (Tylenol) or ibuprofen for pain. No more then 4000 mg per day including what is in the Percocet  Follow up if symptoms persist longer then 3-5 more days         Sinusitis, Adult  Sinusitis is inflammation of your sinuses. Sinuses are hollow spaces in the bones around your face. Your sinuses are located:  · Around your eyes.  · In the middle of your forehead.  · Behind your nose.  · In your cheekbones.  Mucus normally drains out of your sinuses. When your nasal tissues become inflamed or swollen, mucus can become trapped or blocked. This allows bacteria, viruses, and fungi to grow, which leads to infection. Most infections of the sinuses are caused by a virus.  Sinusitis can develop quickly. It can last for up to 4 weeks (acute) or for more than 12 weeks (chronic). Sinusitis often develops after a cold.  What are the causes?  This condition is caused by anything that creates swelling in the sinuses or stops mucus from draining. This includes:  · Allergies.  · Asthma.  · Infection from bacteria or viruses.  · Deformities or blockages in your nose or sinuses.  · Abnormal growths in the nose (nasal polyps).  · Pollutants, such as chemicals or irritants in the air.  · Infection from fungi (rare).  What increases the risk?  You are more likely to develop this condition if you:  · Have a weak body defense system (immune system).  · Do a lot of swimming or diving.  · Overuse nasal sprays.  · Smoke.  What are the signs or symptoms?  The main symptoms of this condition are pain and a feeling of pressure around the affected sinuses. Other symptoms include:  · Stuffy nose or congestion.  · Thick drainage from your nose.  · Swelling and warmth over the affected sinuses.  · Headache.  · Upper toothache.  · A cough that may get worse at night.  · Extra mucus that collects in the throat or the back of the nose (postnasal drip).  · Decreased sense of smell and taste.  · Fatigue.  · A  fever.  · Sore throat.  · Bad breath.  How is this diagnosed?  This condition is diagnosed based on:  · Your symptoms.  · Your medical history.  · A physical exam.  · Tests to find out if your condition is acute or chronic. This may include:  ? Checking your nose for nasal polyps.  ? Viewing your sinuses using a device that has a light (endoscope).  ? Testing for allergies or bacteria.  ? Imaging tests, such as an MRI or CT scan.  In rare cases, a bone biopsy may be done to rule out more serious types of fungal sinus disease.  How is this treated?  Treatment for sinusitis depends on the cause and whether your condition is chronic or acute.  · If caused by a virus, your symptoms should go away on their own within 10 days. You may be given medicines to relieve symptoms. They include:  ? Medicines that shrink swollen nasal passages (topical intranasal decongestants).  ? Medicines that treat allergies (antihistamines).  ? A spray that eases inflammation of the nostrils (topical intranasal corticosteroids).  ? Rinses that help get rid of thick mucus in your nose (nasal saline washes).  · If caused by bacteria, your health care provider may recommend waiting to see if your symptoms improve. Most bacterial infections will get better without antibiotic medicine. You may be given antibiotics if you have:  ? A severe infection.  ? A weak immune system.  · If caused by narrow nasal passages or nasal polyps, you may need to have surgery.  Follow these instructions at home:  Medicines  · Take, use, or apply over-the-counter and prescription medicines only as told by your health care provider. These may include nasal sprays.  · If you were prescribed an antibiotic medicine, take it as told by your health care provider. Do not stop taking the antibiotic even if you start to feel better.  Hydrate and humidify    · Drink enough fluid to keep your urine pale yellow. Staying hydrated will help to thin your mucus.  · Use a cool mist  humidifier to keep the humidity level in your home above 50%.  · Inhale steam for 10-15 minutes, 3-4 times a day, or as told by your health care provider. You can do this in the bathroom while a hot shower is running.  · Limit your exposure to cool or dry air.  Rest  · Rest as much as possible.  · Sleep with your head raised (elevated).  · Make sure you get enough sleep each night.  General instructions    · Apply a warm, moist washcloth to your face 3-4 times a day or as told by your health care provider. This will help with discomfort.  · Wash your hands often with soap and water to reduce your exposure to germs. If soap and water are not available, use hand .  · Do not smoke. Avoid being around people who are smoking (secondhand smoke).  · Keep all follow-up visits as told by your health care provider. This is important.  Contact a health care provider if:  · You have a fever.  · Your symptoms get worse.  · Your symptoms do not improve within 10 days.  Get help right away if:  · You have a severe headache.  · You have persistent vomiting.  · You have severe pain or swelling around your face or eyes.  · You have vision problems.  · You develop confusion.  · Your neck is stiff.  · You have trouble breathing.  Summary  · Sinusitis is soreness and inflammation of your sinuses. Sinuses are hollow spaces in the bones around your face.  · This condition is caused by nasal tissues that become inflamed or swollen. The swelling traps or blocks the flow of mucus. This allows bacteria, viruses, and fungi to grow, which leads to infection.  · If you were prescribed an antibiotic medicine, take it as told by your health care provider. Do not stop taking the antibiotic even if you start to feel better.  · Keep all follow-up visits as told by your health care provider. This is important.  This information is not intended to replace advice given to you by your health care provider. Make sure you discuss any questions you  have with your health care provider.  Document Released: 12/18/2006 Document Revised: 05/20/2019 Document Reviewed: 05/20/2019  [employer] Interactive Patient Education © 2020 [employer]

## 2020-02-21 NOTE — PROGRESS NOTES
Chief Complaint   Patient presents with   • Sinusitis     Subjective   Taylor Davies is a 64 y.o. female who presents to the clinic today with complaints of:sinus infection and worsening of cough for about 12-13 days.   Sinusitis   This is a new problem. Episode onset: 12-13 days ago. The problem has been gradually worsening since onset. There has been no fever. The pain is moderate. Associated symptoms include congestion, coughing, headaches, sinus pressure and a sore throat. Pertinent negatives include no chills, diaphoresis, ear pain, hoarse voice, neck pain, shortness of breath, sneezing or swollen glands. Past treatments include acetaminophen. The treatment provided mild relief.         Current Outpatient Medications:   •  atorvastatin (LIPITOR) 40 MG tablet, Take 40 mg by mouth., Disp: , Rfl:   •  busPIRone (BUSPAR) 7.5 MG tablet, , Disp: , Rfl:   •  Cholecalciferol (VITAMIN D3) 5000 UNITS capsule capsule, Take 10,000 Units by mouth daily., Disp: , Rfl:   •  clonazePAM (KlonoPIN) 1 MG tablet, Take 1 mg by mouth 3 (Three) Times a Day., Disp: , Rfl:   •  clonazePAM (KlonoPIN) 1 MG tablet, , Disp: , Rfl:   •  cloNIDine (CATAPRES) 0.1 MG tablet, Take 0.1 mg by mouth., Disp: , Rfl:   •  cyclobenzaprine (FLEXERIL) 10 MG tablet, Take 5 mg by mouth., Disp: , Rfl:   •  DULoxetine (CYMBALTA) 30 MG capsule, Take 30 mg by mouth Daily. Take along with the 60 MG to equal 90 MG, Disp: , Rfl:   •  DULoxetine (CYMBALTA) 60 MG capsule, Take 120 mg by mouth., Disp: , Rfl:   •  folic acid-vit B6-vit B12 (FOLTABS) 0.8-10-0.115 MG tablet tablet, Take 1 tablet by mouth Daily., Disp: , Rfl:   •  gabapentin (NEURONTIN) 600 MG tablet, Take 800 mg by mouth 3 (Three) Times a Day., Disp: , Rfl:   •  gabapentin (NEURONTIN) 800 MG tablet, Take 800 mg by mouth 3 (Three) Times a Day., Disp: , Rfl:   •  ipratropium-albuterol (DUO-NEB) 0.5-2.5 mg/3 ml nebulizer, Take 3 mL by nebulization 4 (Four) Times a Day As Needed for Wheezing., Disp:  360 mL, Rfl: 1  •  melatonin 5 MG tablet tablet, Take 10 mg by mouth Every Night., Disp: , Rfl:   •  Multiple Vitamin (MULTI VITAMIN DAILY PO), Take 1 tablet by mouth daily., Disp: , Rfl:   •  oxyCODONE-acetaminophen (PERCOCET) 7.5-325 MG per tablet, , Disp: , Rfl:   •  amoxicillin-clavulanate (AUGMENTIN) 875-125 MG per tablet, Take 1 tablet by mouth 2 (Two) Times a Day for 10 days., Disp: 20 tablet, Rfl: 0  •  clopidogrel (PLAVIX) 75 MG tablet, Take 75 mg by mouth., Disp: , Rfl:   •  fluticasone (FLONASE) 50 MCG/ACT nasal spray, 2 sprays into the nostril(s) as directed by provider Daily., Disp: 18.2 mL, Rfl: 0  •  furosemide (LASIX) 20 MG tablet, Take 1 tablet by mouth Daily., Disp: 30 tablet, Rfl: 0  •  methylPREDNISolone (MEDROL, COLE,) 4 MG tablet, Take as directed on package instructions., Disp: 1 each, Rfl: 0    Allergies:  Demerol [meperidine]; Dilaudid [hydromorphone]; Hydrocodone-acetaminophen; Hydromorphone hcl; and Morphine sulfate [morphine]    Past Medical History:   Diagnosis Date   • Anxiety    • Anxiety    • Arthritis    • BMI 45.0-49.9, adult (CMS/[facility]) 10/9/2019   • Bradycardia    • Calcified granuloma of lung (CMS/[facility]) 10/9/2019    Right upper lobe   • Carotid artery stenosis    • Colon polyp    • Colon polyp    • COPD (chronic obstructive pulmonary disease) (CMS/[facility])    • Depression    • Diabetes mellitus (CMS/[facility])    • Fibromyalgia    • Fibromyalgia    • Ground glass opacity present on imaging of lung 11/25/2019   • H/O mammogram 03/10/2011    within normal limits   • Heartburn    • Hyperlipidemia    • Hypertension    • Kidney stones    • Obstructive sleep apnea 10/9/2019   • Personal history of nicotine dependence 10/9/2019   • PVC (premature ventricular contraction)    • Shortness of breath    • Sleep apnea    • Sleep apnea    • TIA (transient ischemic attack) 09/01/2016   • Tobacco user 10/9/2019   • Vitamin B2 deficiency    • Vitamin C deficiency      Past Surgical History:   Procedure  Laterality Date   • CATARACT EXTRACTION  2016   • CERVICAL SPINE SURGERY     • CHOLECYSTECTOMY     • COLONOSCOPY  01/20/2005   • DILATATION AND CURETTAGE     • ENDOSCOPY  07/01/2011   • GASTRIC BYPASS     • GASTRIC BYPASS     • HEMORRHOIDECTOMY     • HEMORRHOIDECTOMY     • HERNIA REPAIR     • PAP SMEAR  03/20/2012    within normal limits   • TUBAL ABDOMINAL LIGATION       Family History   Problem Relation Age of Onset   • Diabetes Mother    • Hypertension Mother    • Colon polyps Mother    • Diabetes Father    • Other Father         heart problems   • Colon polyps Father    • Diabetes Sister    • Hypertension Sister    • Diabetes Brother    • Hypertension Brother    • Coronary artery disease Brother    • Diabetes Brother    • Hypertension Brother    • Coronary artery disease Brother    • Breast cancer Neg Hx    • Ovarian cancer Neg Hx    • Uterine cancer Neg Hx    • Colon cancer Neg Hx    • Melanoma Neg Hx    • Prostate cancer Neg Hx      Social History     Tobacco Use   • Smoking status: Current Every Day Smoker     Packs/day: 0.25     Types: Cigarettes   • Smokeless tobacco: Never Used   • Tobacco comment: decreasing amount    Substance Use Topics   • Alcohol use: No   • Drug use: No       Review of Systems  Review of Systems   Constitutional: Positive for fatigue. Negative for chills, diaphoresis and fever.   HENT: Positive for congestion, postnasal drip, sinus pressure, sinus pain and sore throat. Negative for ear pain, hoarse voice, rhinorrhea and sneezing.    Respiratory: Positive for cough. Negative for shortness of breath and wheezing.    Cardiovascular: Negative for chest pain.   Musculoskeletal: Negative for neck pain.   Skin: Negative for rash.   Neurological: Positive for headaches.   Hematological: Negative for adenopathy.       Objective   /64 (BP Location: Right arm, Patient Position: Sitting, Cuff Size: Adult)   Pulse 87   Temp 98.1 °F (36.7 °C) (Tympanic)   Resp 18   LMP 02/11/2010  (Approximate)   SpO2 98%       Physical Exam   Constitutional: She is oriented to person, place, and time. She appears well-developed and well-nourished.   HENT:   Head: Normocephalic and atraumatic.   Right Ear: Hearing, external ear and ear canal normal. Tympanic membrane is not injected, not perforated, not erythematous, not retracted and not bulging. A middle ear effusion is present.   Left Ear: Hearing, external ear and ear canal normal. Tympanic membrane is not injected, not perforated, not erythematous, not retracted and not bulging. A middle ear effusion is present.   Nose: Mucosal edema present. No rhinorrhea. Right sinus exhibits maxillary sinus tenderness and frontal sinus tenderness. Left sinus exhibits maxillary sinus tenderness and frontal sinus tenderness.   Mouth/Throat: Uvula is midline and mucous membranes are normal. Posterior oropharyngeal erythema present. No oropharyngeal exudate, posterior oropharyngeal edema or tonsillar abscesses. No tonsillar exudate.   Eyes: Pupils are equal, round, and reactive to light.   Neck: Normal range of motion. Neck supple.   Cardiovascular: Normal rate, regular rhythm and normal heart sounds.   Pulmonary/Chest: Effort normal and breath sounds normal.   Lymphadenopathy:     She has no cervical adenopathy.   Neurological: She is alert and oriented to person, place, and time.   Skin: Skin is warm and dry.   Psychiatric: She has a normal mood and affect.   Vitals reviewed.      Assessment/Plan     Taylor was seen today for sinusitis.    Diagnoses and all orders for this visit:    Acute pansinusitis, recurrence not specified    Other orders  -     amoxicillin-clavulanate (AUGMENTIN) 875-125 MG per tablet; Take 1 tablet by mouth 2 (Two) Times a Day for 10 days.  -     fluticasone (FLONASE) 50 MCG/ACT nasal spray; 2 sprays into the nostril(s) as directed by provider Daily.           Drink plenty of fluids   Acetaminophen (Tylenol) or ibuprofen for pain. No more then  4000 mg per day including what is in the Percocet  Follow up if symptoms persist longer then 3-5 more days

## 2020-02-24 PROBLEM — J31.0 CHRONIC RHINITIS: Status: ACTIVE | Noted: 2020-02-24

## 2020-02-24 PROBLEM — R91.8 GROUND GLASS OPACITY PRESENT ON IMAGING OF LUNG: Status: RESOLVED | Noted: 2019-11-25 | Resolved: 2020-02-24

## 2020-02-25 ENCOUNTER — OFFICE VISIT (OUTPATIENT)
Dept: INTERNAL MEDICINE | Facility: CLINIC | Age: 65
End: 2020-02-25

## 2020-02-25 VITALS
HEIGHT: 66 IN | DIASTOLIC BLOOD PRESSURE: 78 MMHG | HEART RATE: 70 BPM | SYSTOLIC BLOOD PRESSURE: 130 MMHG | BODY MASS INDEX: 42.94 KG/M2 | RESPIRATION RATE: 16 BRPM | WEIGHT: 267.2 LBS | OXYGEN SATURATION: 97 %

## 2020-02-25 DIAGNOSIS — G89.29 CHRONIC MIDLINE LOW BACK PAIN WITHOUT SCIATICA: ICD-10-CM

## 2020-02-25 DIAGNOSIS — F17.210 CIGARETTE SMOKER: ICD-10-CM

## 2020-02-25 DIAGNOSIS — F13.20 BENZODIAZEPINE DEPENDENCE (HCC): ICD-10-CM

## 2020-02-25 DIAGNOSIS — G47.33 OBSTRUCTIVE SLEEP APNEA TREATED WITH BIPAP: ICD-10-CM

## 2020-02-25 DIAGNOSIS — F11.20 UNCOMPLICATED OPIOID DEPENDENCE (HCC): ICD-10-CM

## 2020-02-25 DIAGNOSIS — M54.50 CHRONIC MIDLINE LOW BACK PAIN WITHOUT SCIATICA: ICD-10-CM

## 2020-02-25 DIAGNOSIS — R15.1 FECAL SMEARING: ICD-10-CM

## 2020-02-25 DIAGNOSIS — I10 ESSENTIAL HYPERTENSION: Primary | ICD-10-CM

## 2020-02-25 DIAGNOSIS — R39.15 URINARY URGENCY: ICD-10-CM

## 2020-02-25 DIAGNOSIS — Z86.73 HISTORY OF TIA (TRANSIENT ISCHEMIC ATTACK): ICD-10-CM

## 2020-02-25 DIAGNOSIS — E66.01 CLASS 3 SEVERE OBESITY DUE TO EXCESS CALORIES WITH SERIOUS COMORBIDITY AND BODY MASS INDEX (BMI) OF 40.0 TO 44.9 IN ADULT (HCC): ICD-10-CM

## 2020-02-25 DIAGNOSIS — F31.9 BIPOLAR I DISORDER WITH ANXIOUS DISTRESS (HCC): ICD-10-CM

## 2020-02-25 DIAGNOSIS — M79.7 FIBROMYALGIA: ICD-10-CM

## 2020-02-25 DIAGNOSIS — Z86.010 PERSONAL HISTORY OF COLONIC POLYPS: ICD-10-CM

## 2020-02-25 DIAGNOSIS — E11.42 TYPE 2 DIABETES MELLITUS WITH DIABETIC POLYNEUROPATHY, WITHOUT LONG-TERM CURRENT USE OF INSULIN (HCC): ICD-10-CM

## 2020-02-25 DIAGNOSIS — N39.44 NOCTURNAL ENURESIS: ICD-10-CM

## 2020-02-25 PROBLEM — I50.82 BIVENTRICULAR CONGESTIVE HEART FAILURE (HCC): Status: RESOLVED | Noted: 2019-10-26 | Resolved: 2020-02-25

## 2020-02-25 PROBLEM — J84.10 CALCIFIED GRANULOMA OF LUNG (HCC): Status: RESOLVED | Noted: 2019-10-09 | Resolved: 2020-02-25

## 2020-02-25 PROCEDURE — 99204 OFFICE O/P NEW MOD 45 MIN: CPT | Performed by: INTERNAL MEDICINE

## 2020-02-25 RX ORDER — CLONIDINE HYDROCHLORIDE 0.1 MG/1
0.1 TABLET ORAL 3 TIMES DAILY
Status: SHIPPED | COMMUNITY
Start: 2020-02-25

## 2020-02-25 RX ORDER — LISINOPRIL 10 MG/1
10 TABLET ORAL DAILY
Qty: 30 TABLET | Refills: 2 | Status: SHIPPED | OUTPATIENT
Start: 2020-02-25 | End: 2020-03-10 | Stop reason: SINTOL

## 2020-02-25 RX ORDER — ASPIRIN 81 MG/1
81 TABLET ORAL DAILY
Qty: 90 TABLET | Refills: 3 | Status: SHIPPED | OUTPATIENT
Start: 2020-02-25

## 2020-02-25 NOTE — PATIENT INSTRUCTIONS
Kegel Exercises    Kegel exercises can help strengthen your pelvic floor muscles. The pelvic floor is a group of muscles that support your rectum, small intestine, and bladder. In females, pelvic floor muscles also help support the womb (uterus). These muscles help you control the flow of urine and stool.  Kegel exercises are painless and simple, and they do not require any equipment. Your provider may suggest Kegel exercises to:  · Improve bladder and bowel control.  · Improve sexual response.  · Improve weak pelvic floor muscles after surgery to remove the uterus (hysterectomy) or pregnancy (females).  · Improve weak pelvic floor muscles after prostate gland removal or surgery (males).  Kegel exercises involve squeezing your pelvic floor muscles, which are the same muscles you squeeze when you try to stop the flow of urine or keep from passing gas. The exercises can be done while sitting, standing, or lying down, but it is best to vary your position.  Exercises  How to do Kegel exercises:  1. Squeeze your pelvic floor muscles tight. You should feel a tight lift in your rectal area. If you are a female, you should also feel a tightness in your vaginal area. Keep your stomach, buttocks, and legs relaxed.  2. Hold the muscles tight for up to 10 seconds.  3. Breathe normally.  4. Relax your muscles.  5. Repeat as told by your health care provider.  Repeat this exercise daily as told by your health care provider. Continue to do this exercise for at least 4-6 weeks, or for as long as told by your health care provider.  You may be referred to a physical therapist who can help you learn more about how to do Kegel exercises.  Depending on your condition, your health care provider may recommend:  · Varying how long you squeeze your muscles.  · Doing several sets of exercises every day.  · Doing exercises for several weeks.  · Making Kegel exercises a part of your regular exercise routine.  This information is not intended  to replace advice given to you by your health care provider. Make sure you discuss any questions you have with your health care provider.  Document Released: 12/04/2013 Document Revised: 08/07/2019 Document Reviewed: 08/07/2019  [employer] Interactive Patient Education © 2020 [employer]    Clonidine--decrease to 0.1mg 3 times a day for the next week.   On 3/1, decrease to twice a day.  On 3/8, decrease to once in the evening  On 3/15, stop clonidine.  Goal BP is <140/90

## 2020-02-25 NOTE — PROGRESS NOTES
CC: establish care for fibromyalgia    History:  Taylor Davies is a 64 y.o. female who presents today for evaluation of the above problems.  She notes she has been doing reasonably well, but is on Augmentin for an upper respiratory illness for the past 3 to 4 days, though she feels better already.  Blood pressure has been well controlled on clonidine alone, which she takes 4 times daily.  She does have fibromyalgia and is managed on duloxetine, cyclobenzaprine, and gabapentin as well as oxycodone per pain management.  She notes reasonably good control.  She does continue to smoke, though she is not ready to quit and tries to cut down when she can.  She has been having occasional episodes of nocturnal enuresis and has occasional fecal smearing.  She does have history of hysterectomy.    ROS:  Review of Systems   Constitutional: Positive for fatigue. Negative for chills and fever.   HENT: Negative for congestion and sore throat.    Eyes: Negative for visual disturbance.   Respiratory: Negative for cough and shortness of breath.    Cardiovascular: Negative for chest pain and palpitations.   Gastrointestinal: Positive for nausea. Negative for abdominal pain, blood in stool and constipation.   Endocrine: Negative for cold intolerance and heat intolerance.   Genitourinary: Positive for enuresis and urgency. Negative for difficulty urinating and frequency.   Musculoskeletal: Positive for arthralgias, back pain and myalgias.   Skin: Negative for rash.   Neurological: Negative for dizziness and headaches.   Psychiatric/Behavioral: Negative for dysphoric mood. The patient is not nervous/anxious.        Allergies   Allergen Reactions   • Demerol [Meperidine] Itching   • Dilaudid [Hydromorphone] Itching     Dilaudid-5   • Hydrocodone-Acetaminophen Itching   • Hydromorphone Hcl Itching   • Morphine Sulfate [Morphine] Itching     Past Medical History:   Diagnosis Date   • Anxiety    • Anxiety    • Arthritis    • BMI 45.0-49.9,  adult (CMS/[facility]) 10/9/2019   • BMI 45.0-49.9, adult (CMS/[facility]) 10/9/2019   • Bradycardia    • Calcified granuloma of lung (CMS/[facility]) 10/9/2019    Right upper lobe   • Carotid artery stenosis    • Chronic rhinitis 2/24/2020   • Colon polyp    • Colon polyp    • COPD (chronic obstructive pulmonary disease) (CMS/HCC)    • Depression    • Diabetes mellitus (CMS/HCC)    • Fibromyalgia    • Fibromyalgia    • Fibromyalgia, primary    • Ground glass opacity present on imaging of lung 11/25/2019   • H/O mammogram 03/10/2011    within normal limits   • Heartburn    • Hyperlipidemia    • Hypertension    • Kidney stones    • Obstructive sleep apnea 10/9/2019   • Personal history of nicotine dependence 10/9/2019   • PVC (premature ventricular contraction)    • Shortness of breath    • Sleep apnea    • Sleep apnea    • Stroke (CMS/HCC)    • TIA (transient ischemic attack) 09/01/2016   • Tobacco user 10/9/2019   • Vitamin B2 deficiency    • Vitamin C deficiency      Past Surgical History:   Procedure Laterality Date   • CATARACT EXTRACTION  2016   • CERVICAL SPINE SURGERY     • CHOLECYSTECTOMY     • COLONOSCOPY  01/20/2005   • DILATATION AND CURETTAGE     • ENDOSCOPY  07/01/2011   • GASTRIC BYPASS     • GASTRIC BYPASS     • HEMORRHOIDECTOMY     • HEMORRHOIDECTOMY     • HERNIA REPAIR     • PAP SMEAR  03/20/2012    within normal limits   • TUBAL ABDOMINAL LIGATION       Family History   Problem Relation Age of Onset   • Diabetes Mother    • Hypertension Mother    • Colon polyps Mother    • Osteoporosis Mother    • Dementia Mother    • Diabetes Father    • Other Father         heart problems   • Colon polyps Father    • Stroke Father    • Dementia Father    • Diabetes Sister    • Hypertension Sister    • Diabetes Brother    • Hypertension Brother    • Coronary artery disease Brother    • Diabetes Brother    • Hypertension Brother    • Coronary artery disease Brother    • Diabetes Maternal Grandmother    • Alcohol abuse Maternal  Grandmother    • Stroke Maternal Grandmother    • Dementia Maternal Grandmother    • Diabetes Maternal Grandfather    • Diabetes Paternal Grandmother    • Alcohol abuse Paternal Grandmother    • Osteoporosis Paternal Grandmother    • Stroke Paternal Grandmother    • Alcohol abuse Paternal Grandfather    • Breast cancer Neg Hx    • Ovarian cancer Neg Hx    • Uterine cancer Neg Hx    • Colon cancer Neg Hx    • Melanoma Neg Hx    • Prostate cancer Neg Hx       reports that she has been smoking cigarettes. She has been smoking about 0.50 packs per day. She has never used smokeless tobacco. She reports that she does not drink alcohol or use drugs.      Current Outpatient Medications:   •  amoxicillin-clavulanate (AUGMENTIN) 875-125 MG per tablet, Take 1 tablet by mouth 2 (Two) Times a Day for 10 days., Disp: 20 tablet, Rfl: 0  •  atorvastatin (LIPITOR) 40 MG tablet, Take 40 mg by mouth., Disp: , Rfl:   •  busPIRone (BUSPAR) 7.5 MG tablet, Take 7.5 mg by mouth 2 (Two) Times a Day., Disp: , Rfl:   •  Cholecalciferol (VITAMIN D3) 5000 UNITS capsule capsule, Take 10,000 Units by mouth daily., Disp: , Rfl:   •  clonazePAM (KlonoPIN) 1 MG tablet, Take 1 mg by mouth 3 (Three) Times a Day., Disp: , Rfl:   •  cloNIDine (CATAPRES) 0.1 MG tablet, Take 0.1 mg by mouth., Disp: , Rfl:   •  clopidogrel (PLAVIX) 75 MG tablet, Take 75 mg by mouth., Disp: , Rfl:   •  cyclobenzaprine (FLEXERIL) 10 MG tablet, Take 5 mg by mouth., Disp: , Rfl:   •  DULoxetine (CYMBALTA) 30 MG capsule, Take 30 mg by mouth Daily. Take along with the 60 MG to equal 90 MG, Disp: , Rfl:   •  DULoxetine (CYMBALTA) 60 MG capsule, Take 120 mg by mouth., Disp: , Rfl:   •  fluticasone (FLONASE) 50 MCG/ACT nasal spray, 2 sprays into the nostril(s) as directed by provider Daily., Disp: 18.2 mL, Rfl: 0  •  gabapentin (NEURONTIN) 600 MG tablet, Take 800 mg by mouth 3 (Three) Times a Day., Disp: , Rfl:   •  gabapentin (NEURONTIN) 800 MG tablet, Take 800 mg by mouth 3  "(Three) Times a Day., Disp: , Rfl:   •  ipratropium-albuterol (DUO-NEB) 0.5-2.5 mg/3 ml nebulizer, Take 3 mL by nebulization 4 (Four) Times a Day As Needed for Wheezing., Disp: 360 mL, Rfl: 1  •  melatonin 5 MG tablet tablet, Take 10 mg by mouth Every Night., Disp: , Rfl:   •  Multiple Vitamin (MULTI VITAMIN DAILY PO), Take 1 tablet by mouth daily., Disp: , Rfl:   •  oxyCODONE-acetaminophen (PERCOCET) 7.5-325 MG per tablet, Take 1 tablet by mouth Every 8 (Eight) Hours As Needed., Disp: , Rfl:   •  clonazePAM (KlonoPIN) 1 MG tablet, Take 1 mg by mouth 3 (Three) Times a Day As Needed., Disp: , Rfl:   •  folic acid-vit B6-vit B12 (FOLTABS) 0.8-10-0.115 MG tablet tablet, Take 1 tablet by mouth Daily., Disp: , Rfl:   •  furosemide (LASIX) 20 MG tablet, Take 1 tablet by mouth Daily., Disp: 30 tablet, Rfl: 0  •  methylPREDNISolone (MEDROL, COLE,) 4 MG tablet, Take as directed on package instructions., Disp: 1 each, Rfl: 0    OBJECTIVE:  /78 (BP Location: Left arm)   Pulse 70   Resp 16   Ht 167.6 cm (66\")   Wt 121 kg (267 lb 3.2 oz)   LMP 02/11/2010 (Approximate)   SpO2 97%   BMI 43.13 kg/m²    Physical Exam   Constitutional: She is oriented to person, place, and time. She appears well-developed and well-nourished. No distress.   HENT:   Head: Normocephalic and atraumatic.   Right Ear: External ear normal.   Left Ear: External ear normal.   Nose: Nose normal.   Mouth/Throat: Oropharynx is clear and moist. No oropharyngeal exudate.   Eyes: EOM are normal. No scleral icterus.   Neck: Normal range of motion. No tracheal deviation present.   Cardiovascular: Normal rate, regular rhythm and normal heart sounds.   No murmur heard.  Pulmonary/Chest: Effort normal and breath sounds normal. No accessory muscle usage. No respiratory distress. She has no wheezes.   Abdominal: Soft. Bowel sounds are normal. She exhibits no distension. There is no tenderness.   Musculoskeletal: Normal range of motion. She exhibits edema " (trace edema of BLE).   Neurological: She is alert and oriented to person, place, and time. Coordination and gait normal.   Skin: Skin is warm and dry. No cyanosis. Nails show no clubbing.   No jaundice   Psychiatric: She has a normal mood and affect. Her mood appears not anxious. She does not exhibit a depressed mood.       Assessment/Plan    Diagnoses and all orders for this visit:    Essential hypertension  -     lisinopril (PRINIVIL,ZESTRIL) 10 MG tablet; Take 1 tablet by mouth Daily.  Well controlled, BP goal for age is <140/90 per JNC 8 guidelines and continue current medications    Type 2 diabetes mellitus with diabetic polyneuropathy, without long-term current use of insulin (CMS/[facility])  -     Comprehensive Metabolic Panel; Future  -     Hemoglobin A1c; Future  -     Lipid Panel; Future  -     CBC (No Diff); Future  -     Hepatitis C Antibody; Future  -     Microalbumin / Creatinine Urine Ratio - Urine, Clean Catch; Future  Check labs to monitor diabetes. Medication as indicated.     History of TIA (transient ischemic attack)  -     aspirin 81 MG EC tablet; Take 1 tablet by mouth Daily.  ASA & high intensity statin without new symptoms.     Fibromyalgia  Uncomplicated opioid dependence (CMS/HCC)  Benzodiazepine dependence (CMS/HCC)  Chronic midline low back pain without sciatica  -     Urine Drug Screen - Urine, Clean Catch; Future  Continued care per pain management. We will assume prescription of her gabapentin if UDS is appropriate to reflect her prescription of benzo and opioid.    Cigarette smoker  Patient was counseled on and understood the many dangers of continuing to use tobacco. Despite this, Ms. Davies states quitting is not an immediate priority at this time. I reminded the patient that if quitting becomes an increased priority to contact us for help with quitting including pharmacologic & nonpharmacologic options or any additional resources.    Bipolar I disorder with anxious distress  (CMS/[facility])  Managed per Dr. Noriega without worsening.     Class 3 severe obesity due to excess calories with serious comorbidity and body mass index (BMI) of 40.0 to 44.9 in adult (CMS/[facility])  Recommended attention to portion control and being careful about the types and timing of meals for the purpose of weight management.    Obstructive sleep apnea treated with BiPAP  Adherent with Bipap without symptoms.     Nocturnal enuresis  Urinary urgency  -     Urinalysis With Microscopic If Indicated (No Culture) - Urine, Clean Catch; Future  Consider anticholinergic if ongoing, but would try to avoid given concern for polypharmacy.     Personal history of colonic polyps  Fecal smearing  -     Ambulatory Referral to Gastroenterology  GI for evaluation given that she is overdue for surveillance colonoscopy given previous polyps.      An After Visit Summary was printed and given to the patient at discharge.  Return in about 2 months (around 4/25/2020) for Medicare Wellness (Welcome to Medicare).         Leroy Jensen D.O. 2/26/2020   Electronically signed.

## 2020-03-09 ENCOUNTER — TELEPHONE (OUTPATIENT)
Dept: INTERNAL MEDICINE | Facility: CLINIC | Age: 65
End: 2020-03-09

## 2020-03-09 NOTE — TELEPHONE ENCOUNTER
"Patient stated she has been having \"extreme nausea\" and diarrhea since starting lisinopril. Patient has not taken the lisinopril today and said she felt a lot better. She took two clonidine 0.1 mg tablets today.  Please advise.  "

## 2020-03-10 RX ORDER — LOSARTAN POTASSIUM 50 MG/1
50 TABLET ORAL DAILY
Qty: 30 TABLET | Refills: 3 | Status: SHIPPED | OUTPATIENT
Start: 2020-03-10 | End: 2020-01-01

## 2020-03-10 NOTE — TELEPHONE ENCOUNTER
Patient informed to discontinue lisinopril and start losartan 50 mg daily. Patient informed the new script has been sent to her pharmacy.

## 2020-03-30 PROBLEM — J98.11 ATELECTASIS: Status: ACTIVE | Noted: 2020-03-30

## 2020-04-29 NOTE — TELEPHONE ENCOUNTER
"Reviewed guideline with caller, advises she been evaluated in ED. Caller is reluctant to go to ED. Advised, if she chooses not to be seen in ED, to see her PCP or Urgent care in [place]. Caller states she will do this.     Reason for Disposition  • [1] Blister (intact or ruptured) AND [2] on the face, neck, or genitals    Additional Information  • Negative: [1] Difficulty breathing AND [2] exposure to fire, smoke, or fumes  • Negative: Shock suspected (e.g., cold/pale/clammy skin, too weak to stand, low BP, rapid pulse)  • Negative: Difficult to awaken or acting confused (e.g., disoriented, slurred speech)  • Negative: [1] Burn area larger than 10 palms of hand (> 10% BSA) AND [2] blisters  • Negative: Sounds like a life-threatening emergency to the triager  • Negative: Smoke inhalation is main concern  • Negative: Sunburn  • Negative: Electrical burn  • Negative: Chemical burn  • Negative: Burn area larger than 4 palms of hand (> 4% BSA)  • Negative: Burn completely circles an arm or leg  • Negative: Caused by explosion or gunpowder  • Negative: [1] Caused by very hot substance AND [2] center of burn is white (or charred)  • Negative: [1] Blister (intact or ruptured) AND [2] larger than 2 inches (5 cm)  • Negative: [1] Blister (intact or ruptured) on the hand AND [2] larger  than 1 inch (2.5 cm)    Answer Assessment - Initial Assessment Questions  1. ONSET: \"When did it happen?\" If happened < 10 minutes ago, ask: \"Did you apply cold water?\" If not, give First Aid Advice immediately.       Sunday, applied cold water when it happened and Neosporin  2. LOCATION: \"Where is the burn located?\"       Upper and lower lip, chin, down her neck and upper chest  3. BURN SIZE: \"How large is the burn?\"  The palm is roughly 1% of the total body surface area (BSA).      1-2%  4. SEVERITY OF THE BURN: \"Are there any blisters?\"       Was some blisters, lips are blistered now  5. MECHANISM: \"Tell me how it happened.\"      Spilled hot tea " "on herself  6. PAIN: \"Are you having any pain?\" \"How bad is the pain?\" (Scale 1-10; or mild, moderate, severe)    - MILD (1-3): doesn't interfere with normal activities     - MODERATE (4-7): interferes with normal activities or awakens from sleep     - SEVERE (8-10): excruciating pain, unable to do any normal activities       mild  7. INHALATION INJURY: \"Were you exposed to any smoke or fumes?\" If yes: \"Do you have any cough or difficulty breathing?\"      no  8. OTHER SYMPTOMS: \"Do you have any other symptoms?\" (e.g., headache, nausea)      no  9. PREGNANCY: \"Is there any chance you are pregnant?\" \"When was your last menstrual period?\"      na    Protocols used: BURNS - THERMAL-ADULT-AH      "

## 2020-05-11 PROBLEM — R15.9 INCONTINENCE OF FECES: Status: ACTIVE | Noted: 2020-01-01

## 2020-05-11 PROBLEM — Z86.010 HX OF COLONIC POLYP: Status: ACTIVE | Noted: 2020-01-01

## 2020-05-11 PROBLEM — Z83.71 FAMILY HX COLONIC POLYPS: Status: ACTIVE | Noted: 2020-01-01

## 2020-05-11 NOTE — PROGRESS NOTES
[facility] GASTROENTEROLOGY - OFFICE NOTE    5/11/2020    Taylor Davies   1955    Primary Physician: Sean Jett MD    Chief Complaint   Patient presents with   • Colonoscopy   • Fecal Incontinence   nausea.       HISTORY OF PRESENT ILLNESS:    Taylor Davies is a 65 y.o. female presents with fecal incontinence x 1 year. Has some leakage of stool at times.    Has bm daily. No fiber supplement. No significant diarrhea or constipation.  No rectal bleeding. No wt loss. No fever.       Nausea  Started 6 mo ago. Occurs intermittently. Occurs right after eating.  Never eats a large meal.  Typically last until takes pepto bismol.  Has used meclizine and does helps.  Takes asa daily. Takes excedrin 2 times per week. No new medications.  No certain foods.  No vomiting.     Last colonoscopy 2005 by Dr. Stock, noted a colon polyp ( path hyperplastic and adenomatous) and hemorrhoids.   Mother and father had colon polyps.  No family history of colon cancer.     Past Medical History:   Diagnosis Date   • Anxiety    • Anxiety    • Arthritis    • BMI 45.0-49.9, adult (CMS/[facility]) 10/9/2019   • Bradycardia    • Calcified granuloma of lung (CMS/[facility]) 10/9/2019    Right upper lobe   • Carotid artery stenosis    • Chronic rhinitis 2/24/2020   • Colon polyp    • COPD (chronic obstructive pulmonary disease) (CMS/[facility])    • Depression    • Diabetes mellitus (CMS/[facility])    • Fibromyalgia    • Ground glass opacity present on imaging of lung 11/25/2019   • H/O mammogram 03/10/2011    within normal limits   • Heartburn    • Hyperlipidemia    • Hypertension    • Kidney stones    • Obstructive sleep apnea 10/9/2019   • Personal history of nicotine dependence 10/9/2019   • PVC (premature ventricular contraction)    • Sleep apnea    • Stroke (CMS/[facility])    • TIA (transient ischemic attack) 09/01/2016   • Tobacco user 10/9/2019   • Vitamin B2 deficiency    • Vitamin C deficiency        Past Surgical History:   Procedure Laterality  Date   • CATARACT EXTRACTION  2016   • CERVICAL SPINE SURGERY     • CHOLECYSTECTOMY     • COLONOSCOPY  01/20/2005    mixed hyperplastic-adenomatous polyp, severe internal hemorrhoids, non-bleeding   • DILATATION AND CURETTAGE     • ENDOSCOPY  07/01/2011    Status post gastric bypass, candida   • GASTRIC BYPASS     • GASTRIC BYPASS     • HEMORRHOIDECTOMY     • HEMORRHOIDECTOMY     • HERNIA REPAIR     • KIDNEY STONE SURGERY     • PAP SMEAR  03/20/2012    within normal limits   • TUBAL ABDOMINAL LIGATION         Outpatient Medications Marked as Taking for the 5/11/20 encounter (Telemedicine) with Flores Hammond APRN   Medication Sig Dispense Refill   • aspirin 81 MG EC tablet Take 1 tablet by mouth Daily. 90 tablet 3   • atorvastatin (LIPITOR) 40 MG tablet Take 40 mg by mouth.     • busPIRone (BUSPAR) 7.5 MG tablet Take 7.5 mg by mouth 2 (Two) Times a Day.     • Cholecalciferol (VITAMIN D3) 5000 UNITS capsule capsule Take 10,000 Units by mouth daily.     • clonazePAM (KlonoPIN) 1 MG tablet Take 1 mg by mouth 3 (Three) Times a Day.     • cloNIDine (CATAPRES) 0.1 MG tablet Take 0.1 mg by mouth 2 (Two) Times a Day.     • cyclobenzaprine (FLEXERIL) 10 MG tablet Take 5 mg by mouth.     • DULoxetine (CYMBALTA) 30 MG capsule Take 30 mg by mouth Daily. Take along with the 60 MG to equal 90 MG     • DULoxetine (CYMBALTA) 60 MG capsule Take 120 mg by mouth.     • fluticasone (FLONASE) 50 MCG/ACT nasal spray 2 sprays into the nostril(s) as directed by provider Daily. 18.2 mL 0   • gabapentin (NEURONTIN) 800 MG tablet Take 800 mg by mouth 3 (Three) Times a Day.     • ipratropium-albuterol (DUO-NEB) 0.5-2.5 mg/3 ml nebulizer Take 3 mL by nebulization 4 (Four) Times a Day As Needed for Wheezing. 360 mL 1   • melatonin 5 MG tablet tablet Take 10 mg by mouth Every Night.     • Multiple Vitamin (MULTI VITAMIN DAILY PO) Take 1 tablet by mouth daily.     • oxyCODONE-acetaminophen (PERCOCET) 7.5-325 MG per tablet Take 1 tablet by mouth  Every 8 (Eight) Hours As Needed.         Allergies   Allergen Reactions   • Demerol [Meperidine] Itching   • Dilaudid [Hydromorphone] Itching     Dilaudid-5   • Hydrocodone-Acetaminophen Itching   • Hydromorphone Hcl Itching   • Morphine Sulfate [Morphine] Itching       Social History     Socioeconomic History   • Marital status: Single     Spouse name: Not on file   • Number of children: Not on file   • Years of education: Not on file   • Highest education level: Not on file   Tobacco Use   • Smoking status: Current Every Day Smoker     Packs/day: 0.50     Types: Cigarettes   • Smokeless tobacco: Never Used   • Tobacco comment: decreasing amount    Substance and Sexual Activity   • Alcohol use: No   • Drug use: No   • Sexual activity: Yes     Partners: Male     Birth control/protection: Post-menopausal       Family History   Problem Relation Age of Onset   • Diabetes Mother    • Hypertension Mother    • Colon polyps Mother    • Osteoporosis Mother    • Dementia Mother    • Colonic polyp Mother    • Diabetes Father    • Other Father         heart problems   • Colon polyps Father    • Stroke Father    • Dementia Father    • Colonic polyp Father    • Diabetes Sister    • Hypertension Sister    • Diabetes Brother    • Hypertension Brother    • Coronary artery disease Brother    • Diabetes Brother    • Hypertension Brother    • Coronary artery disease Brother    • Diabetes Maternal Grandmother    • Alcohol abuse Maternal Grandmother    • Stroke Maternal Grandmother    • Dementia Maternal Grandmother    • Diabetes Maternal Grandfather    • Diabetes Paternal Grandmother    • Alcohol abuse Paternal Grandmother    • Osteoporosis Paternal Grandmother    • Stroke Paternal Grandmother    • Alcohol abuse Paternal Grandfather    • Breast cancer Neg Hx    • Ovarian cancer Neg Hx    • Uterine cancer Neg Hx    • Colon cancer Neg Hx    • Melanoma Neg Hx    • Prostate cancer Neg Hx        Review of Systems   Constitutional: Negative  for appetite change, chills, fatigue, fever and unexpected weight change.   HENT: Negative for sore throat and trouble swallowing.    Eyes: Negative for visual disturbance.   Respiratory: Negative for cough, shortness of breath and wheezing.    Cardiovascular: Negative for chest pain and palpitations.   Gastrointestinal: Positive for nausea. Negative for abdominal distention, abdominal pain, anal bleeding, blood in stool, constipation, diarrhea and vomiting.        As mentioned in hpi   Genitourinary: Negative for difficulty urinating and hematuria.   Musculoskeletal: Positive for arthralgias (chronic ). Back pain: chronic    Skin: Negative for color change and rash.   Neurological: Negative for dizziness, seizures, syncope, light-headedness and headaches.   Hematological: Negative for adenopathy.   Psychiatric/Behavioral: Negative for confusion. The patient is not nervous/anxious.         There were no vitals filed for this visit.   There is no height or weight on file to calculate BMI.      Physical Exam   Constitutional: She appears well-developed and well-nourished.   Pulmonary/Chest: Effort normal.   Neurological: She is alert.   Skin: Skin is warm.       Results for orders placed or performed during the hospital encounter of 10/26/19   Blood Culture - Blood, Arm, Left   Result Value Ref Range    Blood Culture No growth at 5 days    Blood Culture - Blood, Arm, Right   Result Value Ref Range    Blood Culture No growth at 5 days    Comprehensive Metabolic Panel   Result Value Ref Range    Glucose 167 (H) 65 - 99 mg/dL    BUN 13 8 - 23 mg/dL    Creatinine 0.56 (L) 0.57 - 1.00 mg/dL    Sodium 142 136 - 145 mmol/L    Potassium 4.1 3.5 - 5.2 mmol/L    Chloride 104 98 - 107 mmol/L    CO2 26.0 22.0 - 29.0 mmol/L    Calcium 9.2 8.6 - 10.5 mg/dL    Total Protein 6.9 6.0 - 8.5 g/dL    Albumin 3.40 (L) 3.50 - 5.20 g/dL    ALT (SGPT) 29 1 - 33 U/L    AST (SGOT) 26 1 - 32 U/L    Alkaline Phosphatase 81 39 - 117 U/L    Total  Bilirubin 0.5 0.2 - 1.2 mg/dL    eGFR Non African Amer 109 >60 mL/min/1.73    Globulin 3.5 gm/dL    A/G Ratio 1.0 g/dL    BUN/Creatinine Ratio 23.2 7.0 - 25.0    Anion Gap 12.0 5.0 - 15.0 mmol/L   Protime-INR   Result Value Ref Range    Protime 13.5 11.9 - 14.6 Seconds    INR 1.00 0.91 - 1.09   aPTT   Result Value Ref Range    PTT 31.9 24.1 - 35.0 seconds   Troponin   Result Value Ref Range    Troponin T <0.010 0.000 - 0.030 ng/mL   D-dimer, Quantitative   Result Value Ref Range    D-Dimer, Quantitative 1.34 (H) 0.00 - 0.50 mg/L (FEU)   BNP   Result Value Ref Range    proBNP 1,108.0 (H) 5.0 - 900.0 pg/mL   CBC Auto Differential   Result Value Ref Range    WBC 9.74 3.40 - 10.80 10*3/mm3    RBC 3.84 3.77 - 5.28 10*6/mm3    Hemoglobin 12.9 12.0 - 15.9 g/dL    Hematocrit 38.3 34.0 - 46.6 %    MCV 99.7 (H) 79.0 - 97.0 fL    MCH 33.6 (H) 26.6 - 33.0 pg    MCHC 33.7 31.5 - 35.7 g/dL    RDW 13.5 12.3 - 15.4 %    RDW-SD 49.6 37.0 - 54.0 fl    MPV 12.8 (H) 6.0 - 12.0 fL    Platelets 199 140 - 450 10*3/mm3    Neutrophil % 76.4 (H) 42.7 - 76.0 %    Lymphocyte % 11.4 (L) 19.6 - 45.3 %    Monocyte % 6.7 5.0 - 12.0 %    Eosinophil % 4.7 0.3 - 6.2 %    Basophil % 0.4 0.0 - 1.5 %    Immature Grans % 0.4 0.0 - 0.5 %    Neutrophils, Absolute 7.44 (H) 1.70 - 7.00 10*3/mm3    Lymphocytes, Absolute 1.11 0.70 - 3.10 10*3/mm3    Monocytes, Absolute 0.65 0.10 - 0.90 10*3/mm3    Eosinophils, Absolute 0.46 (H) 0.00 - 0.40 10*3/mm3    Basophils, Absolute 0.04 0.00 - 0.20 10*3/mm3    Immature Grans, Absolute 0.04 0.00 - 0.05 10*3/mm3    nRBC 0.0 0.0 - 0.2 /100 WBC   Blood Gas, Arterial   Result Value Ref Range    Site Left Brachial     Raul's Test N/A     pH, Arterial 7.489 (H) 7.350 - 7.450 pH units    pCO2, Arterial 36.4 35.0 - 45.0 mm Hg    pO2, Arterial 54.6 (C) 83.0 - 108.0 mm Hg    HCO3, Arterial 27.7 (H) 20.0 - 26.0 mmol/L    Base Excess, Arterial 4.2 (H) 0.0 - 2.0 mmol/L    O2 Saturation, Arterial 89.8 (L) 94.0 - 99.0 %    Temperature  37.0 C    Barometric Pressure for Blood Gas 742 mmHg    Modality Room Air     Ventilator Mode NA     Notified Who DR RIVERO     Notified By [id number]     Notified Time 10/26/2019 10:03     Collected by [id number]    Basic Metabolic Panel   Result Value Ref Range    Glucose 172 (H) 65 - 99 mg/dL    BUN 16 8 - 23 mg/dL    Creatinine 0.55 (L) 0.57 - 1.00 mg/dL    Sodium 141 136 - 145 mmol/L    Potassium 4.4 3.5 - 5.2 mmol/L    Chloride 100 98 - 107 mmol/L    CO2 28.0 22.0 - 29.0 mmol/L    Calcium 9.6 8.6 - 10.5 mg/dL    eGFR Non African Amer 111 >60 mL/min/1.73    BUN/Creatinine Ratio 29.1 (H) 7.0 - 25.0    Anion Gap 13.0 5.0 - 15.0 mmol/L   CBC Auto Differential   Result Value Ref Range    WBC 12.26 (H) 3.40 - 10.80 10*3/mm3    RBC 4.21 3.77 - 5.28 10*6/mm3    Hemoglobin 14.0 12.0 - 15.9 g/dL    Hematocrit 41.8 34.0 - 46.6 %    MCV 99.3 (H) 79.0 - 97.0 fL    MCH 33.3 (H) 26.6 - 33.0 pg    MCHC 33.5 31.5 - 35.7 g/dL    RDW 13.1 12.3 - 15.4 %    RDW-SD 47.3 37.0 - 54.0 fl    MPV 13.1 (H) 6.0 - 12.0 fL    Platelets 209 140 - 450 10*3/mm3    Neutrophil % 86.2 (H) 42.7 - 76.0 %    Lymphocyte % 8.2 (L) 19.6 - 45.3 %    Monocyte % 3.7 (L) 5.0 - 12.0 %    Eosinophil % 0.1 (L) 0.3 - 6.2 %    Basophil % 0.4 0.0 - 1.5 %    Neutrophils, Absolute 10.57 (H) 1.70 - 7.00 10*3/mm3    Lymphocytes, Absolute 1.01 0.70 - 3.10 10*3/mm3    Monocytes, Absolute 0.45 0.10 - 0.90 10*3/mm3    Eosinophils, Absolute 0.01 0.00 - 0.40 10*3/mm3    Basophils, Absolute 0.05 0.00 - 0.20 10*3/mm3   POC Glucose Once   Result Value Ref Range    Glucose 123 70 - 130 mg/dL   Adult Transthoracic Echo Limited W/ Cont if Necessary Per Protocol   Result Value Ref Range    BSA 2.2 m^2    IVSd 0.94 cm    LVIDd 4.9 cm    LVIDs 3.3 cm    LVPWd 0.72 cm    IVS/LVPW 1.3     FS 33.0 %    EDV(Teich) 115.0 ml    ESV(Teich) 44.5 ml    EF(Teich) 61.3 %    EDV(cubed) 120.6 ml    ESV(cubed) 36.3 ml    EF(cubed) 69.9 %    LV mass(C)d 138.8 grams    LV mass(C)dI 62.1 grams/m^2     SV(Teich) 70.5 ml    SI(Teich) 31.5 ml/m^2    SV(cubed) 84.3 ml    SI(cubed) 37.7 ml/m^2    Ao root diam 3.4 cm    Ao root area 9.1 cm^2    LA dimension 4.0 cm    LA/Ao 1.2     LVOT diam 2.1 cm    LVOT area 3.5 cm^2    LVOT area(traced) 3.5 cm^2    LVLd ap4 7.9 cm    EDV(MOD-sp4) 131.0 ml    LVLs ap4 6.2 cm    ESV(MOD-sp4) 29.3 ml    EF(MOD-sp4) 77.6 %    SV(MOD-sp4) 101.7 ml    SI(MOD-sp4) 45.5 ml/m^2    Ao root area (BSA corrected) 1.5     LV Huynh Vol (BSA corrected) 58.6 ml/m^2    LV Sys Vol (BSA corrected) 13.1 ml/m^2    MV E max enrrique 49.0 cm/sec    MV A max enrrique 63.8 cm/sec    MV E/A 0.77     MV dec time 0.25 sec    Ao pk enrrique 122.0 cm/sec    Ao max PG 6.0 mmHg    Ao max PG (full) 2.9 mmHg    Ao V2 mean 86.7 cm/sec    Ao mean PG 3.0 mmHg    Ao mean PG (full) 1.0 mmHg    Ao V2 VTI 26.4 cm    CHIARA(I,A) 2.4 cm^2    CHIARA(I,D) 2.4 cm^2    CHIARA(V,A) 2.5 cm^2    CHIARA(V,D) 2.5 cm^2    LV V1 max PG 3.0 mmHg    LV V1 mean PG 2.0 mmHg    LV V1 max 87.2 cm/sec    LV V1 mean 63.9 cm/sec    LV V1 VTI 18.3 cm    SV(Ao) 239.7 ml    SI(Ao) 107.2 ml/m^2    SV(LVOT) 63.4 ml    SI(LVOT) 28.3 ml/m^2    TR max enrrique 227.0 cm/sec    RVSP(TR) 25.6 mmHg    RAP systole 5.0 mmHg     CV ECHO RAJEEV - BZI_BMI 42.0 kilograms/m^2    BH CV ECHO RAJEEV - BSA(HAYCOCK) 2.4 m^2    BH CV ECHO RAJEEV - BZI_METRIC_WEIGHT 117.9 kg     CV ECHO RAJEEV - BZI_METRIC_HEIGHT 167.6 cm    Target HR (85%) 133 bpm    Max. Pred. HR (100%) 156 bpm    LA volume 59.6 cm3    LA Volume Index 26.6 mL/m2    Avg E/e' ratio 7.21     Lat Peak E' Enrrique 6.7 cm/sec    Med Peak E' Enrrique 6.90 cm/sec           ASSESSMENT AND PLAN    Assessment/Plan     Taylor was seen today for colonoscopy and fecal incontinence.    Diagnoses and all orders for this visit:    Incontinence of feces, unspecified fecal incontinence type  -     Cancel: Case Request; Standing  -     Cancel: Case Request  -     Case Request; Standing  -     Case Request    Nausea  -     Case Request; Standing  -     Case  Request    Hx of colonic polyp  -     Case Request; Standing  -     Case Request    Family hx colonic polyps  -     Case Request; Standing  -     Case Request    Essential hypertension    Other orders  -     Sod Picosulfate-Mag Ox-Cit Acd (Clenpiq) 10-3.5-12 MG-GM -GM/160ML solution; Take 2 bottles by mouth 1 (One) Time for 1 dose. Take as directed        In regards to fecal incontinence, recommend daily fiber supplement.  She is due for colonoscopy and is agreeable.          In regards to nausea, unclear etiology. Plan for egd.         ESOPHAGOGASTRODUODENOSCOPY WITH ANESTHESIA (N/A), COLONOSCOPY WITH ANESTHESIA (N/A)  All risks, benefits, alternatives, and indications of colonoscopy procedure have been discussed with the patient. Risks to include perforation of the colon requiring possible surgery or colostomy, risk of bleeding from biopsies or removal of colon tissue, possibility of missing a colon polyp or cancer, or adverse drug reaction.  Benefits to include the diagnosis and management of disease of the colon and rectum. Alternatives to include barium enema, radiographic evaluation, lab testing or no intervention. Pt verbalizes understanding and agrees.       Risk, benefits, and alternatives of endoscopy were explained in full.  They understand that there is a risk of bleeding, perforation, and infection.  The risk of perforation goes up with esophageal dilation.  Other options to evaluate UGI complaints could involve barium swallow or UGI series, but these would be diagnostic tests only.  Patient was given time to ask questions.  I answered them to their satisfaction and they are agreeable to proceeding                           JULIO CESAR Hastings    EMR Dragon/transcription disclaimer:  Much of this encounter note is electronic transcription/translation of spoken language to printed text.  The electronic translation of spoken language may be erroneous, or at times, nonsensical words or phrases may be  inadvertently transcribed.  Although I have reviewed the note for such errors, some may still exist.

## 2020-05-13 PROBLEM — R11.0 NAUSEA: Status: ACTIVE | Noted: 2020-01-01

## 2020-06-29 PROBLEM — Z87.891 PERSONAL HISTORY OF NICOTINE DEPENDENCE: Status: ACTIVE | Noted: 2020-01-01

## 2020-07-25 NOTE — TELEPHONE ENCOUNTER
I may have some of the COVID symptoms, triage, cough and fatigue, but no fever, I have COPD, should I go to ER to be tested, I am in no distress, I am scheduled to be tested Tuesday, for Colonoscopy on Friday, told her to call her PCP on Monday and be seen in ER if in any distress before then. Told her to stay quarantined till PCP contacted or tested     Reason for Disposition  • [1] COVID-19 infection suspected by caller or triager AND [2] mild symptoms (cough, fever, or others) AND [3] no complications or SOB    Additional Information  • Negative: SEVERE difficulty breathing (e.g., struggling for each breath, speaks in single words)  • Negative: Difficult to awaken or acting confused (e.g., disoriented, slurred speech)  • Negative: Bluish (or gray) lips or face now  • Negative: Shock suspected (e.g., cold/pale/clammy skin, too weak to stand, low BP, rapid pulse)  • Negative: Sounds like a life-threatening emergency to the triager  • Negative: [1] COVID-19 exposure AND [2] no symptoms  • Negative: COVID-19 and Breastfeeding, questions about  • Negative: [1] Adult with possible COVID-19 symptoms AND [2] triager concerned about severity of symptoms or other causes  • Negative: SEVERE or constant chest pain or pressure (Exception: mild central chest pain, present only when coughing)  • Negative: MODERATE difficulty breathing (e.g., speaks in phrases, SOB even at rest, pulse 100-120)  • Negative: Patient sounds very sick or weak to the triager  • Negative: MILD difficulty breathing (e.g., minimal/no SOB at rest, SOB with walking, pulse <100)  • Negative: Chest pain or pressure  • Negative: Fever > 103 F (39.4 C)  • Negative: [1] Fever > 101 F (38.3 C) AND [2] age > 60  • Negative: [1] Fever > 100.0 F (37.8 C) AND [2] bedridden (e.g., nursing home patient, CVA, chronic illness, recovering from surgery)  • Negative: HIGH RISK patient (e.g., age > 64 years, diabetes, heart or lung disease, weak immune system)  • Negative:  "Fever present > 3 days (72 hours)  • Negative: [1] Fever returns after gone for over 24 hours AND [2] symptoms worse or not improved  • Negative: [1] Continuous (nonstop) coughing interferes with work or school AND [2] no improvement using cough treatment per protocol    Answer Assessment - Initial Assessment Questions  1. COVID-19 DIAGNOSIS: \"Who made your Coronavirus (COVID-19) diagnosis?\" \"Was it confirmed by a positive lab test?\" If not diagnosed by a HCP, ask \"Are there lots of cases (community spread) where you live?\" (See public health department website, if unsure)      Unknown exposure  2. ONSET: \"When did the COVID-19 symptoms start?\"       Symptoms started 2 days ago  3. WORST SYMPTOM: \"What is your worst symptom?\" (e.g., cough, fever, shortness of breath, muscle aches)      cough  4. COUGH: \"Do you have a cough?\" If so, ask: \"How bad is the cough?\"        Moderate to severe at times  5. FEVER: \"Do you have a fever?\" If so, ask: \"What is your temperature, how was it measured, and when did it start?\"      no  6. RESPIRATORY STATUS: \"Describe your breathing?\" (e.g., shortness of breath, wheezing, unable to speak)       Slight sob  7. BETTER-SAME-WORSE: \"Are you getting better, staying the same or getting worse compared to yesterday?\"  If getting worse, ask, \"In what way?\"      Worse yesterday than today  8. HIGH RISK DISEASE: \"Do you have any chronic medical problems?\" (e.g., asthma, heart or lung disease, weak immune system, etc.)      COPD  9. PREGNANCY: \"Is there any chance you are pregnant?\" \"When was your last menstrual period?\"      no  10. OTHER SYMPTOMS: \"Do you have any other symptoms?\"  (e.g., chills, fatigue, headache, loss of smell or taste, muscle pain, sore throat)        Chills few days ago, no fever, muscle pain chronic    Protocols used: CORONAVIRUS (COVID-19) DIAGNOSED OR SUSPECTED-ADULT-AH      "

## 2020-09-14 ENCOUNTER — TELEPHONE (OUTPATIENT)
Dept: NEUROLOGY | Age: 65
End: 2020-09-14

## 2020-09-14 NOTE — TELEPHONE ENCOUNTER
Called patient to let her know that her appointment for today with Dr. Akbar Soliman had to be rescheduled due to the provider is out of the office. No answer. Left a VM regarding when I have her appointment rescheduled too.

## 2020-09-21 ENCOUNTER — HOSPITAL ENCOUNTER (OUTPATIENT)
Dept: CT IMAGING | Age: 65
Discharge: HOME | End: 2020-09-21
Payer: MEDICARE

## 2020-09-21 PROCEDURE — G0297 LDCT FOR LUNG CA SCREEN: HCPCS

## 2020-10-14 NOTE — TELEPHONE ENCOUNTER
PT rescheduled her endo/colon to 11-23-20 .  PT requested a prep other than [place].  She cant tolerate all that drinking.   PT isn't having any issues.    Discussed COVID test.

## 2020-11-09 PROBLEM — Z83.71 FAMILY HISTORY OF POLYPS IN THE COLON: Status: ACTIVE | Noted: 2020-01-01

## 2020-11-09 PROBLEM — Z86.010 HISTORY OF COLON POLYPS: Status: ACTIVE | Noted: 2020-01-01

## 2020-12-09 NOTE — TELEPHONE ENCOUNTER
PT called and cancelled her procedures for 12-11-20.  Her grand daugher had a car accident in [place] and she has gone down there.  PT will call back and reschedule when this is over.

## 2020-12-22 ENCOUNTER — APPOINTMENT (OUTPATIENT)
Dept: GENERAL RADIOLOGY | Age: 65
End: 2020-12-22
Payer: MEDICARE

## 2020-12-22 ENCOUNTER — HOSPITAL ENCOUNTER (EMERGENCY)
Age: 65
Discharge: HOME | End: 2020-12-22
Attending: EMERGENCY MEDICINE
Payer: MEDICARE

## 2020-12-22 VITALS
RESPIRATION RATE: 20 BRPM | HEART RATE: 95 BPM | HEIGHT: 66 IN | DIASTOLIC BLOOD PRESSURE: 76 MMHG | OXYGEN SATURATION: 95 % | BODY MASS INDEX: 40.18 KG/M2 | WEIGHT: 250 LBS | SYSTOLIC BLOOD PRESSURE: 140 MMHG | TEMPERATURE: 98.7 F

## 2020-12-22 LAB
ADENOVIRUS BY PCR: NOT DETECTED
BASOPHILS ABSOLUTE: 0 K/UL (ref 0–0.2)
BASOPHILS RELATIVE PERCENT: 0.3 % (ref 0–1)
BORDETELLA PARAPERTUSSIS BY PCR: NOT DETECTED
BORDETELLA PERTUSSIS BY PCR: NOT DETECTED
CHLAMYDOPHILIA PNEUMONIAE BY PCR: NOT DETECTED
CORONAVIRUS 229E BY PCR: NOT DETECTED
CORONAVIRUS HKU1 BY PCR: NOT DETECTED
CORONAVIRUS NL63 BY PCR: NOT DETECTED
CORONAVIRUS OC43 BY PCR: NOT DETECTED
EOSINOPHILS ABSOLUTE: 0.2 K/UL (ref 0–0.6)
EOSINOPHILS RELATIVE PERCENT: 1.4 % (ref 0–5)
HCT VFR BLD CALC: 37 % (ref 37–47)
HEMOGLOBIN: 12 G/DL (ref 12–16)
HUMAN METAPNEUMOVIRUS BY PCR: NOT DETECTED
HUMAN RHINOVIRUS/ENTEROVIRUS BY PCR: NOT DETECTED
IMMATURE GRANULOCYTES #: 0.1 K/UL
INFLUENZA A BY PCR: NOT DETECTED
INFLUENZA B BY PCR: NOT DETECTED
LYMPHOCYTES ABSOLUTE: 1.2 K/UL (ref 1.1–4.5)
LYMPHOCYTES RELATIVE PERCENT: 10.4 % (ref 20–40)
MCH RBC QN AUTO: 33.4 PG (ref 27–31)
MCHC RBC AUTO-ENTMCNC: 32.4 G/DL (ref 33–37)
MCV RBC AUTO: 103.1 FL (ref 81–99)
MONOCYTES ABSOLUTE: 0.7 K/UL (ref 0–0.9)
MONOCYTES RELATIVE PERCENT: 6.2 % (ref 0–10)
MYCOPLASMA PNEUMONIAE BY PCR: NOT DETECTED
NEUTROPHILS ABSOLUTE: 9.3 K/UL (ref 1.5–7.5)
NEUTROPHILS RELATIVE PERCENT: 81.1 % (ref 50–65)
PARAINFLUENZA VIRUS 1 BY PCR: NOT DETECTED
PARAINFLUENZA VIRUS 2 BY PCR: NOT DETECTED
PARAINFLUENZA VIRUS 3 BY PCR: NOT DETECTED
PARAINFLUENZA VIRUS 4 BY PCR: NOT DETECTED
PDW BLD-RTO: 14.3 % (ref 11.5–14.5)
PLATELET # BLD: 164 K/UL (ref 130–400)
PMV BLD AUTO: 13 FL (ref 9.4–12.3)
RBC # BLD: 3.59 M/UL (ref 4.2–5.4)
RESPIRATORY SYNCYTIAL VIRUS BY PCR: NOT DETECTED
SARS-COV-2, PCR: NOT DETECTED
WBC # BLD: 11.5 K/UL (ref 4.8–10.8)

## 2020-12-22 PROCEDURE — 99283 EMERGENCY DEPT VISIT LOW MDM: CPT

## 2020-12-22 PROCEDURE — 99999 PR OFFICE/OUTPT VISIT,PROCEDURE ONLY: CPT | Performed by: PHYSICIAN ASSISTANT

## 2020-12-22 PROCEDURE — 71045 X-RAY EXAM CHEST 1 VIEW: CPT

## 2020-12-22 PROCEDURE — 85025 COMPLETE CBC W/AUTO DIFF WBC: CPT

## 2020-12-22 PROCEDURE — 0202U NFCT DS 22 TRGT SARS-COV-2: CPT

## 2020-12-22 PROCEDURE — 36415 COLL VENOUS BLD VENIPUNCTURE: CPT

## 2020-12-22 RX ORDER — GABAPENTIN 400 MG/1
800 CAPSULE ORAL 3 TIMES DAILY
COMMUNITY

## 2020-12-22 RX ORDER — LANOLIN ALCOHOL/MO/W.PET/CERES
3 CREAM (GRAM) TOPICAL NIGHTLY PRN
COMMUNITY

## 2020-12-22 RX ORDER — AZITHROMYCIN 250 MG/1
TABLET, FILM COATED ORAL
Qty: 1 PACKET | Refills: 0 | Status: SHIPPED | OUTPATIENT
Start: 2020-12-22 | End: 2020-12-26

## 2020-12-22 RX ORDER — AMOXICILLIN AND CLAVULANATE POTASSIUM 875; 125 MG/1; MG/1
1 TABLET, FILM COATED ORAL 2 TIMES DAILY
Qty: 20 TABLET | Refills: 0 | Status: SHIPPED | OUTPATIENT
Start: 2020-12-22 | End: 2021-01-01

## 2020-12-22 RX ORDER — OXYCODONE AND ACETAMINOPHEN 7.5; 325 MG/1; MG/1
1 TABLET ORAL EVERY 8 HOURS PRN
COMMUNITY

## 2020-12-22 ASSESSMENT — ENCOUNTER SYMPTOMS
RHINORRHEA: 0
BACK PAIN: 0
ABDOMINAL PAIN: 0
EYE DISCHARGE: 0
SORE THROAT: 0
APNEA: 0
PHOTOPHOBIA: 0
SHORTNESS OF BREATH: 1
ABDOMINAL DISTENTION: 0
EYE PAIN: 0
NAUSEA: 0
COLOR CHANGE: 0
COUGH: 1

## 2020-12-22 NOTE — ED NOTES
Bed: 05  Expected date:   Expected time:   Means of arrival:   Comments:  [address] [facility], [facility]  12/22/20 [id number]

## 2020-12-22 NOTE — ED PROVIDER NOTES
[address] [facility] EMERGENCY DEPT  eMERGENCYdEPARTMENT eNCOUnter      Pt Name: Genna Varela  MRN: [id number]  [place] 1955  Date of evaluation: 12/22/2020  Provider:TAMARA Toribio    CHIEF COMPLAINT       Chief Complaint   Patient presents with    Shortness of Breath     x 2 days         HISTORY OF PRESENT ILLNESS  (Location/Symptom, Timing/Onset, Context/Setting, Quality, Duration, Modifying Factors, Severity.)   Citlali Wolff is a 72 y.o. female who presents to the emergency department with complaints of body aches subjective fever shortness of breath and cough x2 days she is concerned for having Covid.  asymptomatic. She is a smoker denies COPD diagnosis. She denies any chest pain or pressure to me. Patient has normal vitals on triage room air baseline. She denies any loss of taste or smell she denies any loose stools or diarrhea. No nausea vomiting. No  complaints. HPI    Nursing Notes were reviewed and I agree. REVIEW OF SYSTEMS    (2-9 systems for level 4, 10 or more for level 5)     Review of Systems   Constitutional: Positive for fever. Negative for activity change, appetite change and chills. HENT: Negative for congestion, postnasal drip, rhinorrhea and sore throat. Eyes: Negative for photophobia, pain, discharge and visual disturbance. Respiratory: Positive for cough and shortness of breath. Negative for apnea. Cardiovascular: Negative for chest pain and leg swelling. Gastrointestinal: Negative for abdominal distention, abdominal pain and nausea. Genitourinary: Negative for vaginal bleeding. Musculoskeletal: Positive for myalgias. Negative for arthralgias, back pain, joint swelling, neck pain and neck stiffness. Skin: Negative for color change and rash. Neurological: Negative for dizziness, syncope, facial asymmetry and headaches. Hematological: Negative for adenopathy. Does not bruise/bleed easily.    Psychiatric/Behavioral: Negative for agitation, behavioral problems and History reviewed. No pertinent family history. SOCIAL HISTORY       Social History     Socioeconomic History    Marital status: Legally      Spouse name: None    Number of children: None    Years of education: None    Highest education level: None   Occupational History    None   Social Needs    Financial resource strain: None    Food insecurity     Worry: None     Inability: None    Transportation needs     Medical: None     Non-medical: None   Tobacco Use    Smoking status: Current Every Day Smoker     Packs/day: 0.50     Years: 30.00     Pack years: 15.00     Types: Cigarettes    Smokeless tobacco: Never Used   Substance and Sexual Activity    Alcohol use: No    Drug use: No    Sexual activity: None   Lifestyle    Physical activity     Days per week: None     Minutes per session: None    Stress: None   Relationships    Social connections     Talks on phone: None     Gets together: None     Attends Shinto service: None     Active member of club or organization: None     Attends meetings of clubs or organizations: None     Relationship status: None    Intimate partner violence     Fear of current or ex partner: None     Emotionally abused: None     Physically abused: None     Forced sexual activity: None   Other Topics Concern    None   Social History Narrative    None       SCREENINGS           PHYSICAL EXAM    (up to 7 forlevel 4, 8 or more for level 5)     ED Triage Vitals [12/22/20 1336]   BP Temp Temp src Pulse Resp SpO2 Height Weight   (!) 126/59 98.7 °F (37.1 °C) -- 93 18 94 % 5' 6\" (1.676 m) 250 lb (113.4 kg)       Physical Exam  Vitals signs and nursing note reviewed. Constitutional:       General: She is not in acute distress. Appearance: She is well-developed. She is not diaphoretic. HENT:      Head: Normocephalic and atraumatic.       Right Ear: External ear normal.      Left Ear: External ear normal.      Mouth/Throat:      Pharynx: No oropharyngeal exudate. Eyes:      General:         Right eye: No discharge. Left eye: No discharge. Pupils: Pupils are equal, round, and reactive to light. Neck:      Musculoskeletal: Normal range of motion and neck supple. Thyroid: No thyromegaly. Cardiovascular:      Rate and Rhythm: Normal rate and regular rhythm. Heart sounds: Normal heart sounds. No murmur. No friction rub. Pulmonary:      Effort: Pulmonary effort is normal. No respiratory distress. Breath sounds: Normal breath sounds. No stridor. No decreased breath sounds or wheezing. Abdominal:      General: Bowel sounds are normal. There is no distension. Palpations: Abdomen is soft. Tenderness: There is no abdominal tenderness. Musculoskeletal: Normal range of motion. Skin:     General: Skin is warm and dry. Capillary Refill: Capillary refill takes less than 2 seconds. Findings: No rash. Neurological:      General: No focal deficit present. Mental Status: She is alert and oriented to person, place, and time. Cranial Nerves: No cranial nerve deficit. Sensory: No sensory deficit. Coordination: Coordination normal.   Psychiatric:         Mood and Affect: Mood normal.         Behavior: Behavior normal.         Thought Content: Thought content normal.           DIAGNOSTIC RESULTS     RADIOLOGY:   Non-plain film images such as CT, Ultrasound and MRI are read by the radiologist. Plain radiographic images are visualized and preliminarilyinterpreted by No att. providers found with the below findings:      Interpretation per the Radiologist below, if available at the time of this note:    XR CHEST PORTABLE   Final Result   1. New bilateral pulmonary opacities mild concerning for multifocal   pneumonia. Underlying edema considered.    Signed by Dr Chalo Buckley on 12/22/2020 2:51 PM          LABS:  Labs Reviewed   CBC WITH AUTO DIFFERENTIAL - Abnormal; Notable for the following components: Result Value    WBC 11.5 (*)     RBC 3.59 (*)     .1 (*)     MCH 33.4 (*)     MCHC 32.4 (*)     MPV 13.0 (*)     Neutrophils % 81.1 (*)     Lymphocytes % 10.4 (*)     Neutrophils Absolute 9.3 (*)     All other components within normal limits   RESPIRATORY PANEL, MOLECULAR, WITH COVID-19       All other labs were within normal range or notreturned as of this dictation. RE-ASSESSMENT        EMERGENCY DEPARTMENT COURSE and DIFFERENTIAL DIAGNOSIS/MDM:   Vitals:    Vitals:    12/22/20 1336 12/22/20 1400 12/22/20 1430 12/22/20 1500   BP: (!) 126/59 (!) 108/94 120/78 (!) 140/76   Pulse: 93 90 91 95   Resp: 18  25 20   Temp: 98.7 °F (37.1 °C)      SpO2: 94% 94% 94% 95%   Weight: 250 lb (113.4 kg)      Height: 5' 6\" (1.676 m)          MDM  Patient is not positive in regards to any viral panel. The patient has findings consistent with bilateral upper lobe pneumonia I will elect to treat as a community-acquired as she has not been admitted to any hospital the past 90 days. The patient maintains normal oxygen saturation on her room air baseline the plan will be for discharge and monitor fever at home and repeat x-ray scheduled through her doctor in outpatient setting. I do advise if anything should worsen she should return to ED probably and realistically get admitted for failed outpatient kidney acquired pneumonia. She only has 1+ criteria on her curb 65 score which warrants outpatient treatment attempt. PROCEDURES:    Procedures      FINAL IMPRESSION      1.  Bilateral upper lobe community acquired pneumonia          DISPOSITION/PLAN   DISPOSITION Decision To Discharge 12/22/2020 03:26:46 PM      PATIENT REFERRED TO:  [facility] EMERGENCY DEPT  [facility]  [phone]    If symptoms worsen    Marnie Mederos MD  [facility]  [phone]      call for PFT;      DISCHARGE MEDICATIONS:  Discharge Medication List as of 12/22/2020  3:17 PM      START taking these medications    Details   amoxicillin-clavulanate (AUGMENTIN) 875-125 MG per tablet Take 1 tablet by mouth 2 times daily for 10 days, Disp-20 tablet, R-0Print      azithromycin (ZITHROMAX Z-MARY) 250 MG tablet Take 2 tablets (500 mg) on Day 1, and then take 1 tablet (250 mg) on days 2 through 5., Disp-1 packet, R-0Print             (Please note that portions of this note were completed with a voice recognition program.  Efforts were made to edit the dictations but occasionallywords are mis-transcribed.)    Alexandrea Nolan [address], [place]  12/23/20 [place] , [place]  12/23/20 [id number]

## 2021-01-01 ENCOUNTER — HOSPITAL ENCOUNTER (EMERGENCY)
Facility: HOSPITAL | Age: 66
Discharge: HOME | End: 2021-03-06
Attending: EMERGENCY MEDICINE | Admitting: EMERGENCY MEDICINE

## 2021-01-01 ENCOUNTER — TRANSCRIBE ORDERS (OUTPATIENT)
Dept: LAB | Facility: HOSPITAL | Age: 66
End: 2021-01-01

## 2021-01-01 ENCOUNTER — APPOINTMENT (OUTPATIENT)
Dept: GENERAL RADIOLOGY | Facility: HOSPITAL | Age: 66
End: 2021-01-01

## 2021-01-01 ENCOUNTER — READMISSION MANAGEMENT (OUTPATIENT)
Dept: CALL CENTER | Facility: HOSPITAL | Age: 66
End: 2021-01-01

## 2021-01-01 ENCOUNTER — APPOINTMENT (OUTPATIENT)
Dept: PULMONOLOGY | Facility: HOSPITAL | Age: 66
End: 2021-01-01

## 2021-01-01 ENCOUNTER — HOSPITAL ENCOUNTER (EMERGENCY)
Facility: HOSPITAL | Age: 66
End: 2021-04-11
Attending: EMERGENCY MEDICINE | Admitting: EMERGENCY MEDICINE

## 2021-01-01 ENCOUNTER — APPOINTMENT (OUTPATIENT)
Dept: MAMMOGRAPHY | Facility: HOSPITAL | Age: 66
End: 2021-01-01

## 2021-01-01 ENCOUNTER — HOSPITAL ENCOUNTER (OUTPATIENT)
Dept: GENERAL RADIOLOGY | Facility: HOSPITAL | Age: 66
Discharge: HOME | End: 2021-02-17
Admitting: NURSE PRACTITIONER

## 2021-01-01 ENCOUNTER — APPOINTMENT (OUTPATIENT)
Dept: CT IMAGING | Facility: HOSPITAL | Age: 66
End: 2021-01-01

## 2021-01-01 ENCOUNTER — HOSPITAL ENCOUNTER (INPATIENT)
Facility: HOSPITAL | Age: 66
LOS: 3 days | Discharge: HOME | End: 2021-03-20
Attending: FAMILY MEDICINE | Admitting: FAMILY MEDICINE

## 2021-01-01 ENCOUNTER — BULK ORDERING (OUTPATIENT)
Dept: CASE MANAGEMENT | Facility: OTHER | Age: 66
End: 2021-01-01

## 2021-01-01 ENCOUNTER — HOSPITAL ENCOUNTER (OUTPATIENT)
Dept: GENERAL RADIOLOGY | Facility: HOSPITAL | Age: 66
Discharge: HOME | End: 2021-01-18
Admitting: NURSE PRACTITIONER

## 2021-01-01 ENCOUNTER — TELEPHONE (OUTPATIENT)
Dept: GASTROENTEROLOGY | Facility: CLINIC | Age: 66
End: 2021-01-01

## 2021-01-01 ENCOUNTER — LAB (OUTPATIENT)
Dept: LAB | Facility: HOSPITAL | Age: 66
End: 2021-01-01

## 2021-01-01 ENCOUNTER — TRANSCRIBE ORDERS (OUTPATIENT)
Dept: ADMINISTRATIVE | Facility: HOSPITAL | Age: 66
End: 2021-01-01

## 2021-01-01 ENCOUNTER — HOSPITAL ENCOUNTER (OUTPATIENT)
Dept: GENERAL RADIOLOGY | Facility: HOSPITAL | Age: 66
Discharge: HOME | End: 2021-03-06
Admitting: NURSE PRACTITIONER

## 2021-01-01 VITALS
SYSTOLIC BLOOD PRESSURE: 117 MMHG | TEMPERATURE: 98.5 F | RESPIRATION RATE: 21 BRPM | WEIGHT: 252 LBS | OXYGEN SATURATION: 96 % | BODY MASS INDEX: 40.5 KG/M2 | HEIGHT: 66 IN | DIASTOLIC BLOOD PRESSURE: 44 MMHG | HEART RATE: 73 BPM

## 2021-01-01 VITALS
SYSTOLIC BLOOD PRESSURE: 171 MMHG | RESPIRATION RATE: 18 BRPM | HEART RATE: 99 BPM | BODY MASS INDEX: 40.18 KG/M2 | HEIGHT: 66 IN | DIASTOLIC BLOOD PRESSURE: 75 MMHG | TEMPERATURE: 97.3 F | WEIGHT: 250 LBS | OXYGEN SATURATION: 100 %

## 2021-01-01 DIAGNOSIS — J18.9 PNEUMONIA DUE TO INFECTIOUS ORGANISM, UNSPECIFIED LATERALITY, UNSPECIFIED PART OF LUNG: Primary | ICD-10-CM

## 2021-01-01 DIAGNOSIS — I46.9 CARDIAC ARREST (HCC): Primary | ICD-10-CM

## 2021-01-01 DIAGNOSIS — R09.02 HYPOXIA: ICD-10-CM

## 2021-01-01 DIAGNOSIS — Z01.818 PREOP TESTING: Primary | ICD-10-CM

## 2021-01-01 DIAGNOSIS — J18.9 PNEUMONIA OF BOTH LOWER LOBES DUE TO INFECTIOUS ORGANISM: Primary | ICD-10-CM

## 2021-01-01 DIAGNOSIS — Z23 IMMUNIZATION DUE: ICD-10-CM

## 2021-01-01 DIAGNOSIS — Z78.9 FAILURE OF OUTPATIENT TREATMENT: ICD-10-CM

## 2021-01-01 LAB
ALBUMIN SERPL-MCNC: 3.3 G/DL (ref 3.5–5.2)
ALBUMIN SERPL-MCNC: 3.5 G/DL (ref 3.5–5.2)
ALBUMIN/GLOB SERPL: 1.1 G/DL
ALBUMIN/GLOB SERPL: 1.2 G/DL
ALP SERPL-CCNC: 103 U/L (ref 39–117)
ALP SERPL-CCNC: 92 U/L (ref 39–117)
ALT SERPL W P-5'-P-CCNC: 26 U/L (ref 1–33)
ALT SERPL W P-5'-P-CCNC: 30 U/L (ref 1–33)
AMPHET+METHAMPHET UR QL: NEGATIVE
AMPHETAMINES UR QL: NEGATIVE
ANION GAP SERPL CALCULATED.3IONS-SCNC: 11 MMOL/L (ref 5–15)
ANION GAP SERPL CALCULATED.3IONS-SCNC: 6 MMOL/L (ref 5–15)
ANION GAP SERPL CALCULATED.3IONS-SCNC: 8 MMOL/L (ref 5–15)
ARTERIAL PATENCY WRIST A: POSITIVE
AST SERPL-CCNC: 19 U/L (ref 1–32)
AST SERPL-CCNC: 24 U/L (ref 1–32)
ATMOSPHERIC PRESS: 740 MMHG
BACTERIA SPEC AEROBE CULT: NORMAL
BARBITURATES UR QL SCN: NEGATIVE
BASE EXCESS BLDA CALC-SCNC: 2.9 MMOL/L (ref 0–2)
BASOPHILS # BLD AUTO: 0.05 10*3/MM3 (ref 0–0.2)
BASOPHILS # BLD AUTO: 0.05 10*3/MM3 (ref 0–0.2)
BASOPHILS NFR BLD AUTO: 0.4 % (ref 0–1.5)
BASOPHILS NFR BLD AUTO: 0.5 % (ref 0–1.5)
BDY SITE: ABNORMAL
BENZODIAZ UR QL SCN: NEGATIVE
BILIRUB SERPL-MCNC: 0.2 MG/DL (ref 0–1.2)
BILIRUB SERPL-MCNC: 0.3 MG/DL (ref 0–1.2)
BODY TEMPERATURE: 37 C
BUN SERPL-MCNC: 11 MG/DL (ref 8–23)
BUN SERPL-MCNC: 22 MG/DL (ref 8–23)
BUN SERPL-MCNC: 23 MG/DL (ref 8–23)
BUN/CREAT SERPL: 18 (ref 7–25)
BUN/CREAT SERPL: 36.7 (ref 7–25)
BUN/CREAT SERPL: 46 (ref 7–25)
BUPRENORPHINE SERPL-MCNC: NEGATIVE NG/ML
C TRACH RRNA UR QL NAA+PROBE: NEGATIVE
CALCIUM SPEC-SCNC: 9.1 MG/DL (ref 8.6–10.5)
CALCIUM SPEC-SCNC: 9.2 MG/DL (ref 8.6–10.5)
CALCIUM SPEC-SCNC: 9.3 MG/DL (ref 8.6–10.5)
CANNABINOIDS SERPL QL: NEGATIVE
CHLORIDE SERPL-SCNC: 101 MMOL/L (ref 98–107)
CHLORIDE SERPL-SCNC: 102 MMOL/L (ref 98–107)
CHLORIDE SERPL-SCNC: 105 MMOL/L (ref 98–107)
CO2 SERPL-SCNC: 26 MMOL/L (ref 22–29)
CO2 SERPL-SCNC: 27 MMOL/L (ref 22–29)
CO2 SERPL-SCNC: 28 MMOL/L (ref 22–29)
COCAINE UR QL: NEGATIVE
CREAT SERPL-MCNC: 0.5 MG/DL (ref 0.57–1)
CREAT SERPL-MCNC: 0.6 MG/DL (ref 0.57–1)
CREAT SERPL-MCNC: 0.61 MG/DL (ref 0.57–1)
D DIMER PPP FEU-MCNC: 0.66 MG/L (FEU) (ref 0–0.5)
D-LACTATE SERPL-SCNC: 0.8 MMOL/L (ref 0.5–2)
D-LACTATE SERPL-SCNC: 1 MMOL/L (ref 0.5–2)
DEPRECATED RDW RBC AUTO: 55 FL (ref 37–54)
DEPRECATED RDW RBC AUTO: 55.1 FL (ref 37–54)
DEPRECATED RDW RBC AUTO: 55.5 FL (ref 37–54)
EOSINOPHIL # BLD AUTO: 0.19 10*3/MM3 (ref 0–0.4)
EOSINOPHIL # BLD AUTO: 0.21 10*3/MM3 (ref 0–0.4)
EOSINOPHIL NFR BLD AUTO: 1.7 % (ref 0.3–6.2)
EOSINOPHIL NFR BLD AUTO: 2.1 % (ref 0.3–6.2)
ERYTHROCYTE [DISTWIDTH] IN BLOOD BY AUTOMATED COUNT: 14.6 % (ref 12.3–15.4)
ERYTHROCYTE [DISTWIDTH] IN BLOOD BY AUTOMATED COUNT: 14.6 % (ref 12.3–15.4)
ERYTHROCYTE [DISTWIDTH] IN BLOOD BY AUTOMATED COUNT: 14.8 % (ref 12.3–15.4)
FLUAV RNA RESP QL NAA+PROBE: NOT DETECTED
FLUBV RNA RESP QL NAA+PROBE: NOT DETECTED
GAS FLOW AIRWAY: 1 LPM
GFR SERPL CREATININE-BSD FRML MDRD: 100 ML/MIN/1.73
GFR SERPL CREATININE-BSD FRML MDRD: 124 ML/MIN/1.73
GFR SERPL CREATININE-BSD FRML MDRD: 98 ML/MIN/1.73
GLOBULIN UR ELPH-MCNC: 2.9 GM/DL
GLOBULIN UR ELPH-MCNC: 3.1 GM/DL
GLUCOSE BLDC GLUCOMTR-MCNC: 161 MG/DL (ref 70–130)
GLUCOSE BLDC GLUCOMTR-MCNC: 192 MG/DL (ref 70–130)
GLUCOSE BLDC GLUCOMTR-MCNC: 210 MG/DL (ref 70–130)
GLUCOSE BLDC GLUCOMTR-MCNC: 248 MG/DL (ref 70–130)
GLUCOSE SERPL-MCNC: 110 MG/DL (ref 65–99)
GLUCOSE SERPL-MCNC: 152 MG/DL (ref 65–99)
GLUCOSE SERPL-MCNC: 209 MG/DL (ref 65–99)
HCO3 BLDA-SCNC: 29.1 MMOL/L (ref 20–26)
HCT VFR BLD AUTO: 34 % (ref 34–46.6)
HCT VFR BLD AUTO: 37.1 % (ref 34–46.6)
HCT VFR BLD AUTO: 37.1 % (ref 34–46.6)
HGB BLD-MCNC: 11.1 G/DL (ref 12–15.9)
HGB BLD-MCNC: 12 G/DL (ref 12–15.9)
HGB BLD-MCNC: 12.1 G/DL (ref 12–15.9)
HOLD SPECIMEN: NORMAL
IMM GRANULOCYTES # BLD AUTO: 0.06 10*3/MM3 (ref 0–0.05)
IMM GRANULOCYTES # BLD AUTO: 0.07 10*3/MM3 (ref 0–0.05)
IMM GRANULOCYTES NFR BLD AUTO: 0.6 % (ref 0–0.5)
IMM GRANULOCYTES NFR BLD AUTO: 0.6 % (ref 0–0.5)
L PNEUMO1 AG UR QL IA: NEGATIVE
LYMPHOCYTES # BLD AUTO: 1.65 10*3/MM3 (ref 0.7–3.1)
LYMPHOCYTES # BLD AUTO: 1.77 10*3/MM3 (ref 0.7–3.1)
LYMPHOCYTES NFR BLD AUTO: 15.8 % (ref 19.6–45.3)
LYMPHOCYTES NFR BLD AUTO: 16.3 % (ref 19.6–45.3)
Lab: ABNORMAL
M GENITALIUM DNA UR QL NAA+PROBE: NEGATIVE
M HOMINIS DNA SPEC QL NAA+PROBE: NEGATIVE
MCH RBC QN AUTO: 32.8 PG (ref 26.6–33)
MCH RBC QN AUTO: 33.1 PG (ref 26.6–33)
MCH RBC QN AUTO: 33.3 PG (ref 26.6–33)
MCHC RBC AUTO-ENTMCNC: 32.3 G/DL (ref 31.5–35.7)
MCHC RBC AUTO-ENTMCNC: 32.6 G/DL (ref 31.5–35.7)
MCHC RBC AUTO-ENTMCNC: 32.6 G/DL (ref 31.5–35.7)
MCV RBC AUTO: 100.6 FL (ref 79–97)
MCV RBC AUTO: 102.2 FL (ref 79–97)
MCV RBC AUTO: 102.5 FL (ref 79–97)
METHADONE UR QL SCN: NEGATIVE
MODALITY: ABNORMAL
MONOCYTES # BLD AUTO: 0.7 10*3/MM3 (ref 0.1–0.9)
MONOCYTES # BLD AUTO: 0.73 10*3/MM3 (ref 0.1–0.9)
MONOCYTES NFR BLD AUTO: 6.5 % (ref 5–12)
MONOCYTES NFR BLD AUTO: 6.9 % (ref 5–12)
MRSA DNA SPEC QL NAA+PROBE: NORMAL
N GONORRHOEA RRNA UR QL NAA+PROBE: NEGATIVE
NEUTROPHILS NFR BLD AUTO: 7.46 10*3/MM3 (ref 1.7–7)
NEUTROPHILS NFR BLD AUTO: 73.6 % (ref 42.7–76)
NEUTROPHILS NFR BLD AUTO: 75 % (ref 42.7–76)
NEUTROPHILS NFR BLD AUTO: 8.39 10*3/MM3 (ref 1.7–7)
NRBC BLD AUTO-RTO: 0 /100 WBC (ref 0–0.2)
NRBC BLD AUTO-RTO: 0 /100 WBC (ref 0–0.2)
NT-PROBNP SERPL-MCNC: 168.2 PG/ML (ref 0–900)
OPIATES UR QL: POSITIVE
OXYCODONE UR QL SCN: POSITIVE
PCO2 BLDA: 50.4 MM HG (ref 35–45)
PCO2 TEMP ADJ BLD: 50.4 MM HG (ref 35–45)
PCP UR QL SCN: NEGATIVE
PH BLDA: 7.37 PH UNITS (ref 7.35–7.45)
PH, TEMP CORRECTED: 7.37 PH UNITS (ref 7.35–7.45)
PLATELET # BLD AUTO: 267 10*3/MM3 (ref 140–450)
PLATELET # BLD AUTO: 275 10*3/MM3 (ref 140–450)
PLATELET # BLD AUTO: 279 10*3/MM3 (ref 140–450)
PMV BLD AUTO: 11.2 FL (ref 6–12)
PMV BLD AUTO: 12.2 FL (ref 6–12)
PMV BLD AUTO: 12.6 FL (ref 6–12)
PO2 BLDA: 89.3 MM HG (ref 83–108)
PO2 TEMP ADJ BLD: 89.3 MM HG (ref 83–108)
POTASSIUM SERPL-SCNC: 4 MMOL/L (ref 3.5–5.2)
PROPOXYPH UR QL: NEGATIVE
PROT SERPL-MCNC: 6.4 G/DL (ref 6–8.5)
PROT SERPL-MCNC: 6.4 G/DL (ref 6–8.5)
QT INTERVAL: 382 MS
QTC INTERVAL: 443 MS
RBC # BLD AUTO: 3.38 10*6/MM3 (ref 3.77–5.28)
RBC # BLD AUTO: 3.62 10*6/MM3 (ref 3.77–5.28)
RBC # BLD AUTO: 3.63 10*6/MM3 (ref 3.77–5.28)
S PNEUM AG SPEC QL LA: NEGATIVE
SAO2 % BLDCOA: 94.8 % (ref 94–99)
SARS-COV-2 ORF1AB RESP QL NAA+PROBE: NOT DETECTED
SARS-COV-2 RNA PNL SPEC NAA+PROBE: NOT DETECTED
SARS-COV-2 RNA RESP QL NAA+PROBE: NOT DETECTED
SODIUM SERPL-SCNC: 136 MMOL/L (ref 136–145)
SODIUM SERPL-SCNC: 139 MMOL/L (ref 136–145)
SODIUM SERPL-SCNC: 139 MMOL/L (ref 136–145)
TRICYCLICS UR QL SCN: POSITIVE
TROPONIN T SERPL-MCNC: <0.01 NG/ML (ref 0–0.03)
UREAPLASMA DNA SPEC QL NAA+PROBE: NEGATIVE
VENTILATOR MODE: ABNORMAL
WBC # BLD AUTO: 10.13 10*3/MM3 (ref 3.4–10.8)
WBC # BLD AUTO: 11.2 10*3/MM3 (ref 3.4–10.8)
WBC # BLD AUTO: 13.93 10*3/MM3 (ref 3.4–10.8)
WHOLE BLOOD HOLD SPECIMEN: NORMAL
WHOLE BLOOD HOLD SPECIMEN: NORMAL

## 2021-01-01 PROCEDURE — 94799 UNLISTED PULMONARY SVC/PX: CPT

## 2021-01-01 PROCEDURE — 99232 SBSQ HOSP IP/OBS MODERATE 35: CPT | Performed by: INTERNAL MEDICINE

## 2021-01-01 PROCEDURE — 25010000002 METHYLPREDNISOLONE PER 125 MG: Performed by: NURSE PRACTITIONER

## 2021-01-01 PROCEDURE — 25010000002 VANCOMYCIN PER 500 MG: Performed by: INTERNAL MEDICINE

## 2021-01-01 PROCEDURE — 80053 COMPREHEN METABOLIC PANEL: CPT | Performed by: EMERGENCY MEDICINE

## 2021-01-01 PROCEDURE — U0004 COV-19 TEST NON-CDC HGH THRU: HCPCS | Performed by: PAIN MEDICINE

## 2021-01-01 PROCEDURE — 25010000002 CEFTRIAXONE PER 250 MG: Performed by: EMERGENCY MEDICINE

## 2021-01-01 PROCEDURE — 87641 MR-STAPH DNA AMP PROBE: CPT | Performed by: FAMILY MEDICINE

## 2021-01-01 PROCEDURE — 87635 SARS-COV-2 COVID-19 AMP PRB: CPT | Performed by: EMERGENCY MEDICINE

## 2021-01-01 PROCEDURE — 71046 X-RAY EXAM CHEST 2 VIEWS: CPT

## 2021-01-01 PROCEDURE — 71275 CT ANGIOGRAPHY CHEST: CPT

## 2021-01-01 PROCEDURE — 25010000002 EPINEPHRINE 1 MG/10ML SOLUTION PREFILLED SYRINGE: Performed by: EMERGENCY MEDICINE

## 2021-01-01 PROCEDURE — 84484 ASSAY OF TROPONIN QUANT: CPT

## 2021-01-01 PROCEDURE — 25010000002 VANCOMYCIN 10 G RECONSTITUTED SOLUTION: Performed by: FAMILY MEDICINE

## 2021-01-01 PROCEDURE — 83605 ASSAY OF LACTIC ACID: CPT | Performed by: NURSE PRACTITIONER

## 2021-01-01 PROCEDURE — 85025 COMPLETE CBC W/AUTO DIFF WBC: CPT

## 2021-01-01 PROCEDURE — 25010000002 PIPERACILLIN SOD-TAZOBACTAM PER 1 G: Performed by: INTERNAL MEDICINE

## 2021-01-01 PROCEDURE — 83605 ASSAY OF LACTIC ACID: CPT | Performed by: EMERGENCY MEDICINE

## 2021-01-01 PROCEDURE — 85027 COMPLETE CBC AUTOMATED: CPT | Performed by: INTERNAL MEDICINE

## 2021-01-01 PROCEDURE — 25010000002 PIPERACILLIN SOD-TAZOBACTAM PER 1 G: Performed by: FAMILY MEDICINE

## 2021-01-01 PROCEDURE — 36600 WITHDRAWAL OF ARTERIAL BLOOD: CPT

## 2021-01-01 PROCEDURE — 80306 DRUG TEST PRSMV INSTRMNT: CPT | Performed by: NURSE PRACTITIONER

## 2021-01-01 PROCEDURE — 25010000002 CEFTRIAXONE PER 250 MG: Performed by: NURSE PRACTITIONER

## 2021-01-01 PROCEDURE — 25010000002 METHYLPREDNISOLONE PER 40 MG: Performed by: INTERNAL MEDICINE

## 2021-01-01 PROCEDURE — 80048 BASIC METABOLIC PNL TOTAL CA: CPT | Performed by: INTERNAL MEDICINE

## 2021-01-01 PROCEDURE — 99284 EMERGENCY DEPT VISIT MOD MDM: CPT

## 2021-01-01 PROCEDURE — 83880 ASSAY OF NATRIURETIC PEPTIDE: CPT

## 2021-01-01 PROCEDURE — C9803 HOPD COVID-19 SPEC COLLECT: HCPCS | Performed by: PAIN MEDICINE

## 2021-01-01 PROCEDURE — 87040 BLOOD CULTURE FOR BACTERIA: CPT | Performed by: NURSE PRACTITIONER

## 2021-01-01 PROCEDURE — 25010000002 ENOXAPARIN PER 10 MG: Performed by: INTERNAL MEDICINE

## 2021-01-01 PROCEDURE — 85379 FIBRIN DEGRADATION QUANT: CPT | Performed by: NURSE PRACTITIONER

## 2021-01-01 PROCEDURE — 87491 CHLMYD TRACH DNA AMP PROBE: CPT | Performed by: INTERNAL MEDICINE

## 2021-01-01 PROCEDURE — 82962 GLUCOSE BLOOD TEST: CPT

## 2021-01-01 PROCEDURE — 63710000001 INSULIN LISPRO (HUMAN) PER 5 UNITS: Performed by: FAMILY MEDICINE

## 2021-01-01 PROCEDURE — 87798 DETECT AGENT NOS DNA AMP: CPT | Performed by: INTERNAL MEDICINE

## 2021-01-01 PROCEDURE — 87635 SARS-COV-2 COVID-19 AMP PRB: CPT | Performed by: NURSE PRACTITIONER

## 2021-01-01 PROCEDURE — 87899 AGENT NOS ASSAY W/OPTIC: CPT | Performed by: INTERNAL MEDICINE

## 2021-01-01 PROCEDURE — 25010000002 METHYLPREDNISOLONE PER 40 MG: Performed by: FAMILY MEDICINE

## 2021-01-01 PROCEDURE — 93010 ELECTROCARDIOGRAM REPORT: CPT | Performed by: INTERNAL MEDICINE

## 2021-01-01 PROCEDURE — 96365 THER/PROPH/DIAG IV INF INIT: CPT

## 2021-01-01 PROCEDURE — 36415 COLL VENOUS BLD VENIPUNCTURE: CPT

## 2021-01-01 PROCEDURE — 99285 EMERGENCY DEPT VISIT HI MDM: CPT

## 2021-01-01 PROCEDURE — 80053 COMPREHEN METABOLIC PANEL: CPT

## 2021-01-01 PROCEDURE — U0004 COV-19 TEST NON-CDC HGH THRU: HCPCS | Performed by: NURSE PRACTITIONER

## 2021-01-01 PROCEDURE — 94640 AIRWAY INHALATION TREATMENT: CPT

## 2021-01-01 PROCEDURE — 93005 ELECTROCARDIOGRAM TRACING: CPT

## 2021-01-01 PROCEDURE — 82803 BLOOD GASES ANY COMBINATION: CPT

## 2021-01-01 PROCEDURE — 85025 COMPLETE CBC W/AUTO DIFF WBC: CPT | Performed by: EMERGENCY MEDICINE

## 2021-01-01 PROCEDURE — 25010000002 AZITHROMYCIN PER 500 MG: Performed by: NURSE PRACTITIONER

## 2021-01-01 PROCEDURE — 99222 1ST HOSP IP/OBS MODERATE 55: CPT | Performed by: INTERNAL MEDICINE

## 2021-01-01 PROCEDURE — 92950 HEART/LUNG RESUSCITATION CPR: CPT

## 2021-01-01 PROCEDURE — 96367 TX/PROPH/DG ADDL SEQ IV INF: CPT

## 2021-01-01 PROCEDURE — 87636 SARSCOV2 & INF A&B AMP PRB: CPT | Performed by: NURSE PRACTITIONER

## 2021-01-01 PROCEDURE — 87563 M. GENITALIUM AMP PROBE: CPT | Performed by: INTERNAL MEDICINE

## 2021-01-01 PROCEDURE — 0 IOPAMIDOL PER 1 ML: Performed by: NURSE PRACTITIONER

## 2021-01-01 PROCEDURE — 93005 ELECTROCARDIOGRAM TRACING: CPT | Performed by: FAMILY MEDICINE

## 2021-01-01 PROCEDURE — 87591 N.GONORRHOEAE DNA AMP PROB: CPT | Performed by: INTERNAL MEDICINE

## 2021-01-01 PROCEDURE — 87040 BLOOD CULTURE FOR BACTERIA: CPT | Performed by: EMERGENCY MEDICINE

## 2021-01-01 RX ORDER — CYCLOBENZAPRINE HCL 5 MG
5 TABLET ORAL 3 TIMES DAILY
Status: DISCONTINUED | OUTPATIENT
Start: 2021-01-01 | End: 2021-01-01 | Stop reason: HOSPADM

## 2021-01-01 RX ORDER — METHYLPREDNISOLONE SODIUM SUCCINATE 40 MG/ML
40 INJECTION, POWDER, LYOPHILIZED, FOR SOLUTION INTRAMUSCULAR; INTRAVENOUS EVERY 8 HOURS SCHEDULED
Status: DISCONTINUED | OUTPATIENT
Start: 2021-01-01 | End: 2021-01-01

## 2021-01-01 RX ORDER — NICOTINE POLACRILEX 4 MG
15 LOZENGE BUCCAL
Status: DISCONTINUED | OUTPATIENT
Start: 2021-01-01 | End: 2021-01-01 | Stop reason: HOSPADM

## 2021-01-01 RX ORDER — BUDESONIDE AND FORMOTEROL FUMARATE DIHYDRATE 160; 4.5 UG/1; UG/1
2 AEROSOL RESPIRATORY (INHALATION)
Qty: 1 EACH | Refills: 0 | Status: SHIPPED | OUTPATIENT
Start: 2021-01-01

## 2021-01-01 RX ORDER — IPRATROPIUM BROMIDE AND ALBUTEROL SULFATE 2.5; .5 MG/3ML; MG/3ML
3 SOLUTION RESPIRATORY (INHALATION)
Status: DISCONTINUED | OUTPATIENT
Start: 2021-01-01 | End: 2021-01-01 | Stop reason: SDUPTHER

## 2021-01-01 RX ORDER — NICOTINE 21 MG/24HR
1 PATCH, TRANSDERMAL 24 HOURS TRANSDERMAL
Status: DISCONTINUED | OUTPATIENT
Start: 2021-01-01 | End: 2021-01-01

## 2021-01-01 RX ORDER — METHYLPREDNISOLONE SODIUM SUCCINATE 40 MG/ML
40 INJECTION, POWDER, LYOPHILIZED, FOR SOLUTION INTRAMUSCULAR; INTRAVENOUS EVERY 12 HOURS
Status: DISCONTINUED | OUTPATIENT
Start: 2021-01-01 | End: 2021-01-01

## 2021-01-01 RX ORDER — CETIRIZINE HYDROCHLORIDE 10 MG/1
10 TABLET ORAL DAILY
Qty: 30 TABLET | Refills: 0 | Status: SHIPPED | OUTPATIENT
Start: 2021-01-01

## 2021-01-01 RX ORDER — SODIUM CHLORIDE 0.9 % (FLUSH) 0.9 %
10 SYRINGE (ML) INJECTION AS NEEDED
Status: DISCONTINUED | OUTPATIENT
Start: 2021-01-01 | End: 2021-01-01 | Stop reason: HOSPADM

## 2021-01-01 RX ORDER — DEXTROSE MONOHYDRATE 25 G/50ML
25 INJECTION, SOLUTION INTRAVENOUS
Status: DISCONTINUED | OUTPATIENT
Start: 2021-01-01 | End: 2021-01-01 | Stop reason: HOSPADM

## 2021-01-01 RX ORDER — VANCOMYCIN HYDROCHLORIDE 1 G/200ML
1000 INJECTION, SOLUTION INTRAVENOUS EVERY 12 HOURS
Status: DISCONTINUED | OUTPATIENT
Start: 2021-01-01 | End: 2021-01-01

## 2021-01-01 RX ORDER — CETIRIZINE HYDROCHLORIDE 10 MG/1
10 TABLET ORAL DAILY
Status: DISCONTINUED | OUTPATIENT
Start: 2021-01-01 | End: 2021-01-01 | Stop reason: HOSPADM

## 2021-01-01 RX ORDER — AMOXICILLIN AND CLAVULANATE POTASSIUM 875; 125 MG/1; MG/1
1 TABLET, FILM COATED ORAL 2 TIMES DAILY
Qty: 14 TABLET | Refills: 0 | Status: SHIPPED | OUTPATIENT
Start: 2021-01-01

## 2021-01-01 RX ORDER — BUDESONIDE AND FORMOTEROL FUMARATE DIHYDRATE 160; 4.5 UG/1; UG/1
2 AEROSOL RESPIRATORY (INHALATION)
Status: DISCONTINUED | OUTPATIENT
Start: 2021-01-01 | End: 2021-01-01 | Stop reason: HOSPADM

## 2021-01-01 RX ORDER — EPINEPHRINE 0.1 MG/ML
SYRINGE (ML) INJECTION
Status: COMPLETED | OUTPATIENT
Start: 2021-01-01 | End: 2021-01-01

## 2021-01-01 RX ORDER — LANOLIN ALCOHOL/MO/W.PET/CERES
10 CREAM (GRAM) TOPICAL NIGHTLY
Status: DISCONTINUED | OUTPATIENT
Start: 2021-01-01 | End: 2021-01-01 | Stop reason: HOSPADM

## 2021-01-01 RX ORDER — PREDNISONE 20 MG/1
40 TABLET ORAL
Qty: 10 TABLET | Refills: 0 | Status: SHIPPED | OUTPATIENT
Start: 2021-01-01 | End: 2021-01-01

## 2021-01-01 RX ORDER — IPRATROPIUM BROMIDE AND ALBUTEROL SULFATE 2.5; .5 MG/3ML; MG/3ML
3 SOLUTION RESPIRATORY (INHALATION) ONCE
Status: COMPLETED | OUTPATIENT
Start: 2021-01-01 | End: 2021-01-01

## 2021-01-01 RX ORDER — DOXYCYCLINE 100 MG/1
100 CAPSULE ORAL 2 TIMES DAILY
Qty: 20 CAPSULE | Refills: 0 | Status: ON HOLD | OUTPATIENT
Start: 2021-01-01 | End: 2021-01-01 | Stop reason: SDUPTHER

## 2021-01-01 RX ORDER — OXYCODONE AND ACETAMINOPHEN 7.5; 325 MG/1; MG/1
1 TABLET ORAL EVERY 8 HOURS PRN
Status: DISCONTINUED | OUTPATIENT
Start: 2021-01-01 | End: 2021-01-01 | Stop reason: HOSPADM

## 2021-01-01 RX ORDER — DOXYCYCLINE 100 MG/1
100 CAPSULE ORAL 2 TIMES DAILY
Qty: 14 CAPSULE | Refills: 0 | Status: SHIPPED | OUTPATIENT
Start: 2021-01-01

## 2021-01-01 RX ORDER — FLUTICASONE PROPIONATE 50 MCG
2 SPRAY, SUSPENSION (ML) NASAL DAILY
Status: DISCONTINUED | OUTPATIENT
Start: 2021-01-01 | End: 2021-01-01 | Stop reason: HOSPADM

## 2021-01-01 RX ORDER — ATORVASTATIN CALCIUM 40 MG/1
40 TABLET, FILM COATED ORAL NIGHTLY
COMMUNITY

## 2021-01-01 RX ORDER — METHYLPREDNISOLONE SODIUM SUCCINATE 125 MG/2ML
125 INJECTION, POWDER, LYOPHILIZED, FOR SOLUTION INTRAMUSCULAR; INTRAVENOUS ONCE
Status: COMPLETED | OUTPATIENT
Start: 2021-01-01 | End: 2021-01-01

## 2021-01-01 RX ORDER — PREDNISONE 20 MG/1
40 TABLET ORAL
Status: DISCONTINUED | OUTPATIENT
Start: 2021-01-01 | End: 2021-01-01 | Stop reason: HOSPADM

## 2021-01-01 RX ORDER — OXYCODONE AND ACETAMINOPHEN 7.5; 325 MG/1; MG/1
1 TABLET ORAL ONCE
Status: COMPLETED | OUTPATIENT
Start: 2021-01-01 | End: 2021-01-01

## 2021-01-01 RX ORDER — GABAPENTIN 400 MG/1
800 CAPSULE ORAL EVERY 8 HOURS SCHEDULED
Status: DISCONTINUED | OUTPATIENT
Start: 2021-01-01 | End: 2021-01-01 | Stop reason: HOSPADM

## 2021-01-01 RX ORDER — CLONAZEPAM 1 MG/1
1 TABLET ORAL 3 TIMES DAILY
Status: DISCONTINUED | OUTPATIENT
Start: 2021-01-01 | End: 2021-01-01 | Stop reason: HOSPADM

## 2021-01-01 RX ORDER — FAMOTIDINE 20 MG/1
20 TABLET, FILM COATED ORAL 2 TIMES DAILY
Status: DISCONTINUED | OUTPATIENT
Start: 2021-01-01 | End: 2021-01-01 | Stop reason: HOSPADM

## 2021-01-01 RX ORDER — IPRATROPIUM BROMIDE AND ALBUTEROL SULFATE 2.5; .5 MG/3ML; MG/3ML
3 SOLUTION RESPIRATORY (INHALATION)
Status: DISCONTINUED | OUTPATIENT
Start: 2021-01-01 | End: 2021-01-01 | Stop reason: HOSPADM

## 2021-01-01 RX ORDER — DULOXETIN HYDROCHLORIDE 30 MG/1
90 CAPSULE, DELAYED RELEASE ORAL DAILY
Status: DISCONTINUED | OUTPATIENT
Start: 2021-01-01 | End: 2021-01-01 | Stop reason: HOSPADM

## 2021-01-01 RX ORDER — IPRATROPIUM BROMIDE AND ALBUTEROL SULFATE 2.5; .5 MG/3ML; MG/3ML
3 SOLUTION RESPIRATORY (INHALATION) 4 TIMES DAILY PRN
COMMUNITY

## 2021-01-01 RX ADMIN — IPRATROPIUM BROMIDE AND ALBUTEROL SULFATE 3 ML: 2.5; .5 SOLUTION RESPIRATORY (INHALATION) at 21:53

## 2021-01-01 RX ADMIN — IPRATROPIUM BROMIDE AND ALBUTEROL SULFATE 3 ML: 2.5; .5 SOLUTION RESPIRATORY (INHALATION) at 07:28

## 2021-01-01 RX ADMIN — METHYLPREDNISOLONE SODIUM SUCCINATE 40 MG: 40 INJECTION, POWDER, FOR SOLUTION INTRAMUSCULAR; INTRAVENOUS at 05:55

## 2021-01-01 RX ADMIN — Medication 9.75 MG: at 21:38

## 2021-01-01 RX ADMIN — TAZOBACTAM SODIUM AND PIPERACILLIN SODIUM 4.5 G: 500; 4 INJECTION, SOLUTION INTRAVENOUS at 00:08

## 2021-01-01 RX ADMIN — GABAPENTIN 800 MG: 400 CAPSULE ORAL at 21:23

## 2021-01-01 RX ADMIN — METHYLPREDNISOLONE SODIUM SUCCINATE 40 MG: 40 INJECTION, POWDER, FOR SOLUTION INTRAMUSCULAR; INTRAVENOUS at 21:23

## 2021-01-01 RX ADMIN — METHYLPREDNISOLONE SODIUM SUCCINATE 40 MG: 40 INJECTION, POWDER, FOR SOLUTION INTRAMUSCULAR; INTRAVENOUS at 18:38

## 2021-01-01 RX ADMIN — CYCLOBENZAPRINE HYDROCHLORIDE 5 MG: 5 TABLET, FILM COATED ORAL at 08:55

## 2021-01-01 RX ADMIN — INSULIN LISPRO 2 UNITS: 100 INJECTION, SOLUTION INTRAVENOUS; SUBCUTANEOUS at 08:55

## 2021-01-01 RX ADMIN — CEFTRIAXONE SODIUM 1 G: 1 INJECTION, POWDER, FOR SOLUTION INTRAMUSCULAR; INTRAVENOUS at 16:25

## 2021-01-01 RX ADMIN — BUDESONIDE AND FORMOTEROL FUMARATE DIHYDRATE 2 PUFF: 160; 4.5 AEROSOL RESPIRATORY (INHALATION) at 07:47

## 2021-01-01 RX ADMIN — CYCLOBENZAPRINE HYDROCHLORIDE 5 MG: 5 TABLET, FILM COATED ORAL at 20:37

## 2021-01-01 RX ADMIN — TAZOBACTAM SODIUM AND PIPERACILLIN SODIUM 3.38 G: 375; 3 INJECTION, SOLUTION INTRAVENOUS at 16:13

## 2021-01-01 RX ADMIN — CYCLOBENZAPRINE HYDROCHLORIDE 5 MG: 5 TABLET, FILM COATED ORAL at 15:56

## 2021-01-01 RX ADMIN — BUDESONIDE AND FORMOTEROL FUMARATE DIHYDRATE 2 PUFF: 160; 4.5 AEROSOL RESPIRATORY (INHALATION) at 19:44

## 2021-01-01 RX ADMIN — FAMOTIDINE 20 MG: 20 TABLET, FILM COATED ORAL at 21:23

## 2021-01-01 RX ADMIN — IPRATROPIUM BROMIDE AND ALBUTEROL SULFATE 3 ML: 2.5; .5 SOLUTION RESPIRATORY (INHALATION) at 15:21

## 2021-01-01 RX ADMIN — METHYLPREDNISOLONE SODIUM SUCCINATE 40 MG: 40 INJECTION, POWDER, FOR SOLUTION INTRAMUSCULAR; INTRAVENOUS at 15:56

## 2021-01-01 RX ADMIN — Medication 9.75 MG: at 20:36

## 2021-01-01 RX ADMIN — TAZOBACTAM SODIUM AND PIPERACILLIN SODIUM 3.38 G: 375; 3 INJECTION, SOLUTION INTRAVENOUS at 08:56

## 2021-01-01 RX ADMIN — GABAPENTIN 800 MG: 400 CAPSULE ORAL at 05:56

## 2021-01-01 RX ADMIN — OXYCODONE HYDROCHLORIDE AND ACETAMINOPHEN 1 TABLET: 7.5; 325 TABLET ORAL at 01:27

## 2021-01-01 RX ADMIN — IPRATROPIUM BROMIDE AND ALBUTEROL SULFATE 3 ML: 2.5; .5 SOLUTION RESPIRATORY (INHALATION) at 10:22

## 2021-01-01 RX ADMIN — IOPAMIDOL 77 ML: 755 INJECTION, SOLUTION INTRAVENOUS at 23:10

## 2021-01-01 RX ADMIN — OXYCODONE HYDROCHLORIDE AND ACETAMINOPHEN 1 TABLET: 7.5; 325 TABLET ORAL at 16:25

## 2021-01-01 RX ADMIN — INSULIN LISPRO 3 UNITS: 100 INJECTION, SOLUTION INTRAVENOUS; SUBCUTANEOUS at 17:24

## 2021-01-01 RX ADMIN — OXYCODONE HYDROCHLORIDE AND ACETAMINOPHEN 1 TABLET: 7.5; 325 TABLET ORAL at 10:04

## 2021-01-01 RX ADMIN — FLUTICASONE PROPIONATE 2 SPRAY: 50 SPRAY, METERED NASAL at 12:11

## 2021-01-01 RX ADMIN — FAMOTIDINE 20 MG: 20 TABLET, FILM COATED ORAL at 20:37

## 2021-01-01 RX ADMIN — Medication 1 MG: at 18:21

## 2021-01-01 RX ADMIN — VANCOMYCIN HYDROCHLORIDE 1500 MG: 10 INJECTION, POWDER, LYOPHILIZED, FOR SOLUTION INTRAVENOUS at 11:17

## 2021-01-01 RX ADMIN — CLONAZEPAM 1 MG: 1 TABLET ORAL at 08:55

## 2021-01-01 RX ADMIN — OXYCODONE HYDROCHLORIDE AND ACETAMINOPHEN 1 TABLET: 7.5; 325 TABLET ORAL at 02:11

## 2021-01-01 RX ADMIN — AZITHROMYCIN 500 MG: 500 INJECTION, POWDER, LYOPHILIZED, FOR SOLUTION INTRAVENOUS at 21:58

## 2021-01-01 RX ADMIN — FAMOTIDINE 20 MG: 20 TABLET, FILM COATED ORAL at 08:54

## 2021-01-01 RX ADMIN — CETIRIZINE HYDROCHLORIDE 10 MG: 10 TABLET ORAL at 08:55

## 2021-01-01 RX ADMIN — CETIRIZINE HYDROCHLORIDE 10 MG: 10 TABLET ORAL at 08:54

## 2021-01-01 RX ADMIN — GABAPENTIN 800 MG: 400 CAPSULE ORAL at 05:19

## 2021-01-01 RX ADMIN — FLUTICASONE PROPIONATE 2 SPRAY: 50 SPRAY, METERED NASAL at 11:17

## 2021-01-01 RX ADMIN — VANCOMYCIN HYDROCHLORIDE 1000 MG: 1 INJECTION, SOLUTION INTRAVENOUS at 10:30

## 2021-01-01 RX ADMIN — CETIRIZINE HYDROCHLORIDE 10 MG: 10 TABLET ORAL at 11:16

## 2021-01-01 RX ADMIN — Medication 1 MG: at 18:28

## 2021-01-01 RX ADMIN — CLONAZEPAM 1 MG: 1 TABLET ORAL at 20:37

## 2021-01-01 RX ADMIN — GABAPENTIN 800 MG: 400 CAPSULE ORAL at 14:06

## 2021-01-01 RX ADMIN — DOXYCYCLINE 100 MG: 100 INJECTION, POWDER, LYOPHILIZED, FOR SOLUTION INTRAVENOUS at 16:49

## 2021-01-01 RX ADMIN — GABAPENTIN 800 MG: 400 CAPSULE ORAL at 20:36

## 2021-01-01 RX ADMIN — IPRATROPIUM BROMIDE AND ALBUTEROL SULFATE 3 ML: 2.5; .5 SOLUTION RESPIRATORY (INHALATION) at 11:02

## 2021-01-01 RX ADMIN — TAZOBACTAM SODIUM AND PIPERACILLIN SODIUM 3.38 G: 375; 3 INJECTION, SOLUTION INTRAVENOUS at 00:11

## 2021-01-01 RX ADMIN — Medication 1 MG: at 18:25

## 2021-01-01 RX ADMIN — VANCOMYCIN HYDROCHLORIDE 1000 MG: 1 INJECTION, SOLUTION INTRAVENOUS at 22:50

## 2021-01-01 RX ADMIN — VANCOMYCIN HYDROCHLORIDE 1250 MG: 10 INJECTION, POWDER, LYOPHILIZED, FOR SOLUTION INTRAVENOUS at 00:08

## 2021-01-01 RX ADMIN — FLUTICASONE PROPIONATE 2 SPRAY: 50 SPRAY, METERED NASAL at 08:56

## 2021-01-01 RX ADMIN — ENOXAPARIN SODIUM 40 MG: 40 INJECTION SUBCUTANEOUS at 18:39

## 2021-01-01 RX ADMIN — IPRATROPIUM BROMIDE AND ALBUTEROL SULFATE 3 ML: 2.5; .5 SOLUTION RESPIRATORY (INHALATION) at 15:42

## 2021-01-01 RX ADMIN — TAZOBACTAM SODIUM AND PIPERACILLIN SODIUM 4.5 G: 500; 4 INJECTION, SOLUTION INTRAVENOUS at 16:26

## 2021-01-01 RX ADMIN — DULOXETINE HYDROCHLORIDE 90 MG: 30 CAPSULE, DELAYED RELEASE ORAL at 08:54

## 2021-01-01 RX ADMIN — SODIUM CHLORIDE, PRESERVATIVE FREE 10 ML: 5 INJECTION INTRAVENOUS at 08:56

## 2021-01-01 RX ADMIN — Medication 9.75 MG: at 01:27

## 2021-01-01 RX ADMIN — METHYLPREDNISOLONE SODIUM SUCCINATE 40 MG: 40 INJECTION, POWDER, FOR SOLUTION INTRAMUSCULAR; INTRAVENOUS at 05:19

## 2021-01-01 RX ADMIN — TAZOBACTAM SODIUM AND PIPERACILLIN SODIUM 4.5 G: 500; 4 INJECTION, SOLUTION INTRAVENOUS at 08:55

## 2021-01-01 RX ADMIN — IPRATROPIUM BROMIDE AND ALBUTEROL SULFATE 3 ML: 2.5; .5 SOLUTION RESPIRATORY (INHALATION) at 07:13

## 2021-01-01 RX ADMIN — OXYCODONE HYDROCHLORIDE AND ACETAMINOPHEN 1 TABLET: 7.5; 325 TABLET ORAL at 17:59

## 2021-01-01 RX ADMIN — BUDESONIDE AND FORMOTEROL FUMARATE DIHYDRATE 2 PUFF: 160; 4.5 AEROSOL RESPIRATORY (INHALATION) at 11:23

## 2021-01-01 RX ADMIN — CYCLOBENZAPRINE HYDROCHLORIDE 5 MG: 5 TABLET, FILM COATED ORAL at 09:31

## 2021-01-01 RX ADMIN — INSULIN LISPRO 2 UNITS: 100 INJECTION, SOLUTION INTRAVENOUS; SUBCUTANEOUS at 08:57

## 2021-01-01 RX ADMIN — CLONAZEPAM 1 MG: 1 TABLET ORAL at 21:23

## 2021-01-01 RX ADMIN — ENOXAPARIN SODIUM 40 MG: 40 INJECTION SUBCUTANEOUS at 18:09

## 2021-01-01 RX ADMIN — TAZOBACTAM SODIUM AND PIPERACILLIN SODIUM 3.38 G: 375; 3 INJECTION, SOLUTION INTRAVENOUS at 11:17

## 2021-01-01 RX ADMIN — IPRATROPIUM BROMIDE AND ALBUTEROL SULFATE 3 ML: 2.5; .5 SOLUTION RESPIRATORY (INHALATION) at 19:44

## 2021-01-01 RX ADMIN — OXYCODONE HYDROCHLORIDE AND ACETAMINOPHEN 1 TABLET: 7.5; 325 TABLET ORAL at 06:01

## 2021-01-01 RX ADMIN — DULOXETINE HYDROCHLORIDE 90 MG: 30 CAPSULE, DELAYED RELEASE ORAL at 08:56

## 2021-01-01 RX ADMIN — OXYCODONE HYDROCHLORIDE AND ACETAMINOPHEN 1 TABLET: 7.5; 325 TABLET ORAL at 10:33

## 2021-01-01 RX ADMIN — IPRATROPIUM BROMIDE AND ALBUTEROL SULFATE 3 ML: 2.5; .5 SOLUTION RESPIRATORY (INHALATION) at 07:26

## 2021-01-01 RX ADMIN — CLONAZEPAM 1 MG: 1 TABLET ORAL at 16:25

## 2021-01-01 RX ADMIN — CYCLOBENZAPRINE HYDROCHLORIDE 5 MG: 5 TABLET, FILM COATED ORAL at 08:54

## 2021-01-01 RX ADMIN — METHYLPREDNISOLONE SODIUM SUCCINATE 125 MG: 125 INJECTION, POWDER, FOR SOLUTION INTRAMUSCULAR; INTRAVENOUS at 22:12

## 2021-01-01 RX ADMIN — CEFTRIAXONE SODIUM 1 G: 1 INJECTION, POWDER, FOR SOLUTION INTRAMUSCULAR; INTRAVENOUS at 21:46

## 2021-01-01 RX ADMIN — INSULIN LISPRO 3 UNITS: 100 INJECTION, SOLUTION INTRAVENOUS; SUBCUTANEOUS at 11:54

## 2021-01-01 RX ADMIN — FAMOTIDINE 20 MG: 20 TABLET, FILM COATED ORAL at 08:56

## 2021-01-01 RX ADMIN — CYCLOBENZAPRINE HYDROCHLORIDE 5 MG: 5 TABLET, FILM COATED ORAL at 21:24

## 2021-03-05 NOTE — TELEPHONE ENCOUNTER
PT called wanting to reschedule her procedures.  PT isn't on any blood thinners.  She does take a 81 mg ASA daily.    Pt is having incontinence with bowels. PT said it is embarrassing. No blood.  She didn't want to discuss it.      I told her I would send you a message.  I told her she might have to schedule an OV to discuss her issues.

## 2021-03-06 NOTE — ED PROVIDER NOTES
Subjective   Patient says that she started noticing little wheeze in her upper airways 3 to 4 days ago when she was generally feeling bad.  She has had some cough that is nonproductive.  She did not describe any fever or chills.  She was concerned because she has recently been treated for pneumonia a couple of different times so she went to the urgent care center today.  They told her she had bilateral pneumonia and sent her here.  She says that she was first diagnosed with pneumonia and December just before Christmas and was treated and seemed a little better but then on recheck in January still had some pneumonia so she was given a second round of antibiotics.  In February she was checked again and still had some residual pneumonia so they gave her some antibiotics and steroids at that time.  She had thought she was getting better and then had this fall back.  She says she is not had any antibiotics in 2 to 3 weeks now but does not member the names of the one she was given before.      History provided by:  Patient   used: No    Cough  Cough characteristics:  Non-productive  Sputum characteristics:  Nondescript  Severity:  Mild  Onset quality:  Gradual  Duration:  3 days  Timing:  Intermittent  Progression:  Worsening  Chronicity:  Recurrent  Smoker: yes    Context: not animal exposure, not exposure to allergens, not fumes, not occupational exposure, not sick contacts, not smoke exposure, not upper respiratory infection, not weather changes and not with activity    Relieved by:  Nothing  Worsened by:  Nothing  Ineffective treatments:  None tried  Associated symptoms: wheezing    Associated symptoms: no chest pain, no chills, no diaphoresis, no ear fullness, no ear pain, no eye discharge, no fever, no headaches, no myalgias, no rash, no rhinorrhea, no shortness of breath, no sinus congestion, no sore throat and no weight loss    Risk factors: recent infection    Risk factors: no chemical  exposure and no recent travel        Review of Systems   Constitutional: Negative.  Negative for chills, diaphoresis, fever and weight loss.   HENT: Negative.  Negative for ear pain, rhinorrhea and sore throat.    Eyes: Negative for discharge.   Respiratory: Positive for cough and wheezing. Negative for shortness of breath.    Cardiovascular: Negative.  Negative for chest pain.   Gastrointestinal: Negative.    Genitourinary: Negative.    Musculoskeletal: Negative.  Negative for myalgias.   Skin: Negative.  Negative for rash.   Neurological: Negative.  Negative for headaches.   Hematological: Negative.    Psychiatric/Behavioral: Negative.    All other systems reviewed and are negative.      Past Medical History:   Diagnosis Date   • Anxiety    • Anxiety    • Arthritis    • BMI 45.0-49.9, adult (CMS/[facility]) 10/9/2019   • Bradycardia    • Calcified granuloma of lung (CMS/HCC) 10/9/2019    Right upper lobe   • Carotid artery stenosis    • Chronic rhinitis 2/24/2020   • Colon polyp    • COPD (chronic obstructive pulmonary disease) (CMS/HCC)    • Depression    • Diabetes mellitus (CMS/HCC)    • Fibromyalgia    • Ground glass opacity present on imaging of lung 11/25/2019   • H/O mammogram 03/10/2011    within normal limits   • Heartburn    • Hyperlipidemia    • Hypertension    • Kidney stones    • Obstructive sleep apnea 10/9/2019   • Personal history of nicotine dependence 10/9/2019   • PVC (premature ventricular contraction)    • Sleep apnea    • Stroke (CMS/[facility])    • TIA (transient ischemic attack) 09/01/2016   • Tobacco user 10/9/2019   • Vitamin B2 deficiency    • Vitamin C deficiency        Allergies   Allergen Reactions   • Demerol [Meperidine] Itching   • Hydromorphone Hcl Itching   • Dilaudid [Hydromorphone] Itching     Dilaudid-5   • Morphine Sulfate [Morphine] Itching       Past Surgical History:   Procedure Laterality Date   • CATARACT EXTRACTION  2016   • CERVICAL SPINE SURGERY     • CHOLECYSTECTOMY     •  COLONOSCOPY  01/20/2005    mixed hyperplastic-adenomatous polyp, severe internal hemorrhoids, non-bleeding   • DILATATION AND CURETTAGE     • ENDOSCOPY  07/01/2011    Status post gastric bypass, candida   • GASTRIC BYPASS     • GASTRIC BYPASS     • HEMORRHOIDECTOMY     • HEMORRHOIDECTOMY     • HERNIA REPAIR     • KIDNEY STONE SURGERY     • PAP SMEAR  03/20/2012    within normal limits   • TUBAL ABDOMINAL LIGATION         Family History   Problem Relation Age of Onset   • Diabetes Mother    • Hypertension Mother    • Colon polyps Mother    • Osteoporosis Mother    • Dementia Mother    • Colonic polyp Mother    • Diabetes Father    • Other Father         heart problems   • Colon polyps Father    • Stroke Father    • Dementia Father    • Colonic polyp Father    • Diabetes Sister    • Hypertension Sister    • Diabetes Brother    • Hypertension Brother    • Coronary artery disease Brother    • Diabetes Brother    • Hypertension Brother    • Coronary artery disease Brother    • Diabetes Maternal Grandmother    • Alcohol abuse Maternal Grandmother    • Stroke Maternal Grandmother    • Dementia Maternal Grandmother    • Diabetes Maternal Grandfather    • Diabetes Paternal Grandmother    • Alcohol abuse Paternal Grandmother    • Osteoporosis Paternal Grandmother    • Stroke Paternal Grandmother    • Alcohol abuse Paternal Grandfather    • Breast cancer Neg Hx    • Ovarian cancer Neg Hx    • Uterine cancer Neg Hx    • Colon cancer Neg Hx    • Melanoma Neg Hx    • Prostate cancer Neg Hx        Social History     Socioeconomic History   • Marital status: Legally      Spouse name: Not on file   • Number of children: Not on file   • Years of education: Not on file   • Highest education level: Not on file   Tobacco Use   • Smoking status: Current Every Day Smoker     Packs/day: 0.50     Types: Cigarettes   • Smokeless tobacco: Never Used   • Tobacco comment: decreasing amount    Vaping Use   • Vaping Use: Never used    Substance and Sexual Activity   • Alcohol use: No   • Drug use: No   • Sexual activity: Yes     Partners: Male     Birth control/protection: Post-menopausal       Prior to Admission medications    Medication Sig Start Date End Date Taking? Authorizing Provider   aspirin 81 MG EC tablet Take 1 tablet by mouth Daily. 2/25/20  Yes Leroy Jensen, DO   busPIRone (BUSPAR) 7.5 MG tablet Take 7.5 mg by mouth 2 (Two) Times a Day. 2/20/20  Yes Damian Noriega MD   Cholecalciferol (VITAMIN D3) 5000 UNITS capsule capsule Take 10,000 Units by mouth daily.   Yes Augustin Vargas MD   clonazePAM (KlonoPIN) 1 MG tablet Take 1 mg by mouth 3 (Three) Times a Day. 12/11/18  Yes Augustin Vargas MD   cloNIDine (CATAPRES) 0.1 MG tablet Take 0.1 mg by mouth 2 (Two) Times a Day. 2/25/20  Yes Leroy Jensen DO   cyclobenzaprine (FLEXERIL) 10 MG tablet Take 5 mg by mouth. 6/11/17  Yes Augustin Vargas MD   DULoxetine (CYMBALTA) 30 MG capsule Take 30 mg by mouth Daily. Take along with the 60 MG to equal 90 MG   Yes Damian Noriega MD   DULoxetine (CYMBALTA) 60 MG capsule Take 120 mg by mouth.   Yes Damian Noriega MD   fluticasone (FLONASE) 50 MCG/ACT nasal spray 2 sprays into the nostril(s) as directed by provider Daily. 2/21/20  Yes Helen Haas APRN   gabapentin (NEURONTIN) 800 MG tablet Take 800 mg by mouth 3 (Three) Times a Day.   Yes Augustin Vargas MD   melatonin 5 MG tablet tablet Take 10 mg by mouth Every Night.   Yes Augustin Vargas MD   Multiple Vitamin (MULTI VITAMIN DAILY PO) Take 1 tablet by mouth daily.   Yes Augustin Vargas MD   oxyCODONE-acetaminophen (PERCOCET) 7.5-325 MG per tablet Take 1 tablet by mouth Every 8 (Eight) Hours As Needed. 2/20/20  Yes Damian Noriega MD   promethazine-dextromethorphan (PROMETHAZINE-DM) 6.25-15 MG/5ML syrup Take 5 mL by mouth 4 (Four) Times a Day As Needed for Cough. 2/17/21 3/6/21  Shelia Sousa,  APRN       Medications   cefTRIAXone (ROCEPHIN) 1 g/100 mL 0.9% NS (MBP) (0 g Intravenous Stopped 3/6/21 1649)   doxycycline (VIBRAMYCIN) 100 mg/100 mL 0.9% NS MBP (100 mg Intravenous New Bag 3/6/21 1649)   oxyCODONE-acetaminophen (PERCOCET) 7.5-325 MG per tablet 1 tablet (1 tablet Oral Given 3/6/21 1625)       Vitals:    03/06/21 1655   BP: 135/67   Pulse:    Resp: 17   Temp:    SpO2:          Objective   Physical Exam  Vitals and nursing note reviewed.   Constitutional:       Appearance: Normal appearance.   HENT:      Head: Normocephalic and atraumatic.      Mouth/Throat:      Mouth: Mucous membranes are moist.      Pharynx: Oropharynx is clear.   Eyes:      Extraocular Movements: Extraocular movements intact.      Pupils: Pupils are equal, round, and reactive to light.   Cardiovascular:      Rate and Rhythm: Normal rate and regular rhythm.   Pulmonary:      Effort: Pulmonary effort is normal.      Breath sounds: Normal breath sounds.   Abdominal:      Palpations: Abdomen is soft.      Tenderness: There is no abdominal tenderness.   Musculoskeletal:         General: Normal range of motion.      Cervical back: Normal range of motion and neck supple.   Skin:     General: Skin is warm and dry.      Capillary Refill: Capillary refill takes less than 2 seconds.   Neurological:      General: No focal deficit present.      Mental Status: She is alert and oriented to person, place, and time.   Psychiatric:         Mood and Affect: Mood normal.         Behavior: Behavior normal.         Procedures         Lab Results (last 24 hours)     Procedure Component Value Units Date/Time    COVID PRE-OP / PRE-PROCEDURE SCREENING ORDER (NO ISOLATION) - Swab, Nasal Cavity [[id number]]  (Normal) Collected: 03/06/21 1443    Specimen: Swab from Nasal Cavity Updated: 03/06/21 1721    Narrative:      The following orders were created for panel order COVID PRE-OP / PRE-PROCEDURE SCREENING ORDER (NO ISOLATION) - Swab, Nasal  Cavity.  Procedure                               Abnormality         Status                     ---------                               -----------         ------                     COVID-19,[facility]...[[id number]]  Normal              Final result                 Please view results for these tests on the individual orders.    COVID-19,[facility] IN-HOUSE,Nasal Swab No Transport Media 3-4 HR TAT - Swab, Nasal Cavity [[id number]]  (Normal) Collected: 03/06/21 1443    Specimen: Swab from Nasal Cavity Updated: 03/06/21 1721     COVID19 Not Detected    Narrative:      Fact sheet for providers: https://www.fda.gov/media/432093/download     Fact sheet for patients: https://www.fda.gov/media/137229/download    Test performed by PCR.    Consider negative results in combination with clinical observations, patient history, and epidemiological information.  Fact sheet for providers: https://www.fda.gov/media/629833/download     Fact sheet for patients: https://www.fda.gov/media/565125/download    Test performed by PCR.    Consider negative results in combination with clinical observations, patient history, and epidemiological information.    CBC & Differential [[id number]]  (Abnormal) Collected: 03/06/21 1605    Specimen: Blood Updated: 03/06/21 1630    Narrative:      The following orders were created for panel order CBC & Differential.  Procedure                               Abnormality         Status                     ---------                               -----------         ------                     CBC Auto Differential[[id number]]        Abnormal            Final result                 Please view results for these tests on the individual orders.    Comprehensive Metabolic Panel [[id number]]  (Abnormal) Collected: 03/06/21 1605    Specimen: Blood Updated: 03/06/21 1653     Glucose 110 mg/dL      BUN 11 mg/dL      Creatinine 0.61 mg/dL      Sodium 139 mmol/L      Potassium 4.0 mmol/L      Chloride 101 mmol/L       CO2 27.0 mmol/L      Calcium 9.1 mg/dL      Total Protein 6.4 g/dL      Albumin 3.30 g/dL      ALT (SGPT) 26 U/L      AST (SGOT) 19 U/L      Alkaline Phosphatase 103 U/L      Total Bilirubin 0.3 mg/dL      eGFR Non African Amer 98 mL/min/1.73      Globulin 3.1 gm/dL      A/G Ratio 1.1 g/dL      BUN/Creatinine Ratio 18.0     Anion Gap 11.0 mmol/L     Narrative:      GFR Normal >60  Chronic Kidney Disease <60  Kidney Failure <15      Blood Culture - Blood, Arm, Left [[id number]] Collected: 03/06/21 1605    Specimen: Blood from Arm, Left Updated: 03/06/21 1629    Lactic Acid, Plasma [[id number]]  (Normal) Collected: 03/06/21 1605    Specimen: Blood Updated: 03/06/21 1650     Lactate 0.8 mmol/L     CBC Auto Differential [[id number]]  (Abnormal) Collected: 03/06/21 1605    Specimen: Blood Updated: 03/06/21 1630     WBC 10.13 10*3/mm3      RBC 3.62 10*6/mm3      Hemoglobin 12.0 g/dL      Hematocrit 37.1 %      .5 fL      MCH 33.1 pg      MCHC 32.3 g/dL      RDW 14.6 %      RDW-SD 55.1 fl      MPV 11.2 fL      Platelets 275 10*3/mm3      Neutrophil % 73.6 %      Lymphocyte % 16.3 %      Monocyte % 6.9 %      Eosinophil % 2.1 %      Basophil % 0.5 %      Immature Grans % 0.6 %      Neutrophils, Absolute 7.46 10*3/mm3      Lymphocytes, Absolute 1.65 10*3/mm3      Monocytes, Absolute 0.70 10*3/mm3      Eosinophils, Absolute 0.21 10*3/mm3      Basophils, Absolute 0.05 10*3/mm3      Immature Grans, Absolute 0.06 10*3/mm3      nRBC 0.0 /100 WBC     Blood Culture - Blood, Hand, Right [[id number]] Collected: 03/06/21 1610    Specimen: Blood from Hand, Right Updated: 03/06/21 1629    COVID PRE-OP / PRE-PROCEDURE SCREENING ORDER (NO ISOLATION) - Swab, Nasal Cavity [[id number]]  (Normal) Collected: 03/06/21 1613    Specimen: Swab from Nasal Cavity Updated: 03/06/21 1724    Narrative:      The following orders were created for panel order COVID PRE-OP / PRE-PROCEDURE SCREENING ORDER (NO ISOLATION) - Swab, Nasal Cavity.  Procedure                                Abnormality         Status                     ---------                               -----------         ------                     COVID-19,[facility]...[[id number]]  Normal              Final result                 Please view results for these tests on the individual orders.    COVID-19,[facility] IN-HOUSE,Nasal Swab No Transport Media 3-4 HR TAT - Swab, Nasal Cavity [[id number]]  (Normal) Collected: 03/06/21 1613    Specimen: Swab from Nasal Cavity Updated: 03/06/21 1721     COVID19 Not Detected    Narrative:      Fact sheet for providers: https://www.fda.gov/media/801860/download     Fact sheet for patients: https://www.[employer].gov/media/993142/download    Test performed by PCR.    Consider negative results in combination with clinical observations, patient history, and epidemiological information.  Fact sheet for providers: https://www.fda.gov/media/761097/download     Fact sheet for patients: https://www.[employer].gov/media/606325/download    Test performed by PCR.    Consider negative results in combination with clinical observations, patient history, and epidemiological information.          No orders to display       ED Course  ED Course as of Mar 06 1806   Sat Mar 06, 2021   1805 I told the patient her lab work was all fine.  I did look at the chest x-ray done in urgent care and it does record bilateral infiltrates consistent with pneumonia.  However her white count is normal and her lactic acid is normal and she is not febrile.  I told her with this recurrent respiratory infection that it would be reasonable to put her in the hospital as failed outpatient therapy.  However since she is afebrile and her pulse ox is fine and her lab work is all fine we could treat her as an outpatient again if she preferred to do that.  That is what she is chosen.  From my reading of her chart she has been a round of Augmentin and Zithromax at 1 time and Levaquin a second time and Omnicef a third time  so I will try doxycycline.  She should follow-up with her regular doctor for reevaluation.  She is discharged in stable condition.    [TR]      ED Course User Index  [TR] Todd Mercado Jr., MD          MDM  Number of Diagnoses or Management Options  Pneumonia of both lower lobes due to infectious organism: new and requires workup     Amount and/or Complexity of Data Reviewed  Clinical lab tests: ordered and reviewed  Tests in the radiology section of CPT®: ordered and reviewed    Risk of Complications, Morbidity, and/or Mortality  Presenting problems: moderate  Diagnostic procedures: moderate  Management options: moderate    Patient Progress  Patient progress: stable      Final diagnoses:   Pneumonia of both lower lobes due to infectious organism          Todd Mercado Jr., MD  03/06/21 4885

## 2021-03-17 PROBLEM — J18.9 PNEUMONIA DUE TO INFECTIOUS ORGANISM: Status: ACTIVE | Noted: 2021-01-01

## 2021-03-18 NOTE — PLAN OF CARE
Problem: Adult Inpatient Plan of Care  Goal: Plan of Care Review  Outcome: Ongoing, Progressing  Flowsheets (Taken 3/18/2021 0621)  Progress: no change  Plan of Care Reviewed With: patient  Outcome Summary: Admitted pt from ER. Pt complains of back pain that is chronic in nature, see MAR. No complaints of nausea. On cont pulse ox. Wearing home Bipap and has been resting comfortably. Cont pulse ox. VSS.

## 2021-03-18 NOTE — ED PROVIDER NOTES
Subjective   Patient is a 65-year-old white female presents the emergency department with increased shortness of breath over the past 2 days.  She states she is been treated with doxycycline for the past 2weeks for pneumonia and states that she is getting much worse.  She states tonight she became much more short of breath.  She does continue to smoke about a half a pack a day.  She states she has had some intermittent chest tightness today as well.  She states she was tested for COVID-19 about 2 weeks ago and the results were negative.  She states she presents tonight because she was much more short of breath and lightheaded at home.      History provided by:  Patient   used: No        Review of Systems   Constitutional: Negative.    HENT: Negative.    Eyes: Negative.    Respiratory:        Patient is a 65-year-old white female presents the emergency department with increased shortness of breath over the past 2 days.  She states she is been treated with doxycycline for the past 2weeks for pneumonia and states that she is getting much worse.  She states tonight she became much more short of breath.  She does continue to smoke about a half a pack a day.  She states she has had some intermittent chest tightness today as well.  She states she was tested for COVID-19 about 2 weeks ago and the results were negative.  She states she presents tonight because she was much more short of breath and lightheaded at home.     Cardiovascular: Negative.    Gastrointestinal: Negative.    Endocrine: Negative.    Genitourinary: Negative.    Musculoskeletal: Negative.    Skin: Negative.    Allergic/Immunologic: Negative.    Neurological: Negative.    Hematological: Negative.    Psychiatric/Behavioral: Negative.    All other systems reviewed and are negative.      Past Medical History:   Diagnosis Date   • Anxiety    • Anxiety    • Arthritis    • BMI 45.0-49.9, adult (CMS/[facility], located within [facility]) 10/9/2019   • Bradycardia    •  Calcified granuloma of lung (CMS/HCC) 10/9/2019    Right upper lobe   • Carotid artery stenosis    • Chronic rhinitis 2/24/2020   • Colon polyp    • COPD (chronic obstructive pulmonary disease) (CMS/[facility])    • Depression    • Diabetes mellitus (CMS/[facility])    • Fibromyalgia    • Ground glass opacity present on imaging of lung 11/25/2019   • H/O mammogram 03/10/2011    within normal limits   • Heartburn    • Hyperlipidemia    • Hypertension    • Kidney stones    • Obstructive sleep apnea 10/9/2019   • Personal history of nicotine dependence 10/9/2019   • PVC (premature ventricular contraction)    • Sleep apnea    • Stroke (CMS/[facility])    • TIA (transient ischemic attack) 09/01/2016   • Tobacco user 10/9/2019   • Vitamin B2 deficiency    • Vitamin C deficiency        Allergies   Allergen Reactions   • Demerol [Meperidine] Itching   • Dilaudid [Hydromorphone] Itching     Dilaudid-5   • Hydromorphone Hcl Itching   • Morphine Sulfate [Morphine] Itching       Past Surgical History:   Procedure Laterality Date   • CATARACT EXTRACTION  2016   • CERVICAL SPINE SURGERY     • CHOLECYSTECTOMY     • COLONOSCOPY  01/20/2005    mixed hyperplastic-adenomatous polyp, severe internal hemorrhoids, non-bleeding   • DILATATION AND CURETTAGE     • ENDOSCOPY  07/01/2011    Status post gastric bypass, candida   • GASTRIC BYPASS     • GASTRIC BYPASS     • HEMORRHOIDECTOMY     • HEMORRHOIDECTOMY     • HERNIA REPAIR     • KIDNEY STONE SURGERY     • PAP SMEAR  03/20/2012    within normal limits   • TUBAL ABDOMINAL LIGATION         Family History   Problem Relation Age of Onset   • Diabetes Mother    • Hypertension Mother    • Colon polyps Mother    • Osteoporosis Mother    • Dementia Mother    • Colonic polyp Mother    • Diabetes Father    • Other Father         heart problems   • Colon polyps Father    • Stroke Father    • Dementia Father    • Colonic polyp Father    • Diabetes Sister    • Hypertension Sister    • Diabetes Brother    • Hypertension  Brother    • Coronary artery disease Brother    • Diabetes Brother    • Hypertension Brother    • Coronary artery disease Brother    • Diabetes Maternal Grandmother    • Alcohol abuse Maternal Grandmother    • Stroke Maternal Grandmother    • Dementia Maternal Grandmother    • Diabetes Maternal Grandfather    • Diabetes Paternal Grandmother    • Alcohol abuse Paternal Grandmother    • Osteoporosis Paternal Grandmother    • Stroke Paternal Grandmother    • Alcohol abuse Paternal Grandfather    • Breast cancer Neg Hx    • Ovarian cancer Neg Hx    • Uterine cancer Neg Hx    • Colon cancer Neg Hx    • Melanoma Neg Hx    • Prostate cancer Neg Hx        Social History     Socioeconomic History   • Marital status: Legally      Spouse name: Not on file   • Number of children: Not on file   • Years of education: Not on file   • Highest education level: Not on file   Tobacco Use   • Smoking status: Current Every Day Smoker     Packs/day: 0.50     Types: Cigarettes   • Smokeless tobacco: Never Used   • Tobacco comment: decreasing amount    Vaping Use   • Vaping Use: Never used   Substance and Sexual Activity   • Alcohol use: No   • Drug use: No   • Sexual activity: Yes     Partners: Male     Birth control/protection: Post-menopausal       Prior to Admission medications    Medication Sig Start Date End Date Taking? Authorizing Provider   aspirin 81 MG EC tablet Take 1 tablet by mouth Daily. 2/25/20   Leroy Jensen DO   busPIRone (BUSPAR) 7.5 MG tablet Take 7.5 mg by mouth 2 (Two) Times a Day. 2/20/20   Damian Noriega MD   Cholecalciferol (VITAMIN D3) 5000 UNITS capsule capsule Take 10,000 Units by mouth daily.    ProviderAugustin MD   clonazePAM (KlonoPIN) 1 MG tablet Take 1 mg by mouth 3 (Three) Times a Day. 12/11/18   Augustin Vargas MD   cloNIDine (CATAPRES) 0.1 MG tablet Take 0.1 mg by mouth 2 (Two) Times a Day. 2/25/20   Leroy Jensen DO   cyclobenzaprine (FLEXERIL) 10 MG tablet  "Take 5 mg by mouth. 6/11/17   Augustin Vargas MD   doxycycline (MONODOX) 100 MG capsule Take 1 capsule by mouth 2 (Two) Times a Day. 3/6/21   Tdod Mercado Jr., MD   DULoxetine (CYMBALTA) 30 MG capsule Take 30 mg by mouth Daily. Take along with the 60 MG to equal 90 MG    Damian Noriega MD   DULoxetine (CYMBALTA) 60 MG capsule Take 120 mg by mouth.    Damian Noriega MD   fluticasone (FLONASE) 50 MCG/ACT nasal spray 2 sprays into the nostril(s) as directed by provider Daily. 2/21/20   Helen Haas APRN   gabapentin (NEURONTIN) 800 MG tablet Take 800 mg by mouth 3 (Three) Times a Day.    Augustin Vargas MD   melatonin 5 MG tablet tablet Take 10 mg by mouth Every Night.    Augustin Vargas MD   Multiple Vitamin (MULTI VITAMIN DAILY PO) Take 1 tablet by mouth daily.    Augustin Vargas MD   oxyCODONE-acetaminophen (PERCOCET) 7.5-325 MG per tablet Take 1 tablet by mouth Every 8 (Eight) Hours As Needed. 2/20/20   Damian Noriega MD       /75 (BP Location: Right arm, Patient Position: Sitting)   Pulse 99   Temp 97.3 °F (36.3 °C) (Axillary)   Resp 18   Ht 167.6 cm (66\")   Wt 113 kg (250 lb)   LMP 02/11/2010 (Approximate)   SpO2 100%   BMI 40.35 kg/m²     Objective   Physical Exam  Vitals and nursing note reviewed.   Constitutional:       Appearance: She is well-developed.      Comments: Patient is very anxious   HENT:      Head: Normocephalic and atraumatic.   Eyes:      Conjunctiva/sclera: Conjunctivae normal.      Pupils: Pupils are equal, round, and reactive to light.   Neck:      Thyroid: No thyromegaly.      Trachea: No tracheal deviation.   Cardiovascular:      Rate and Rhythm: Normal rate and regular rhythm.      Heart sounds: Normal heart sounds.   Pulmonary:      Effort: Pulmonary effort is normal. No respiratory distress.      Breath sounds: No wheezing or rales.      Comments: exp wheezing noted throughout.  There is diminished lung " sounds bilateral bases.  Chest:      Chest wall: No tenderness.   Abdominal:      General: Bowel sounds are normal.      Palpations: Abdomen is soft.   Musculoskeletal:         General: Normal range of motion.      Cervical back: Normal range of motion and neck supple.   Skin:     General: Skin is warm and dry.   Neurological:      Mental Status: She is alert and oriented to person, place, and time.      Cranial Nerves: No cranial nerve deficit.      Deep Tendon Reflexes: Reflexes are normal and symmetric.   Psychiatric:         Behavior: Behavior normal.         Thought Content: Thought content normal.         Judgment: Judgment normal.         Procedures         Lab Results (last 24 hours)     ** No results found for the last 24 hours. **          CT Angiogram Chest   Final Result   1. No evidence of embolic disease.           2. Pneumonia       3 these images initially reviewed by stat rad March 17 23:38 hours.           This report was finalized on 03/18/2021 07:13 by Dr. Rayshawn Ramirez MD.      XR Chest 2 View   Final Result   1. Stable infiltrates in both lung bases left greater than right. This   could be acute or chronic. It appears relatively stable compared back to   1/18/2021.   2. Stable bronchial wall thickening.            This report was finalized on 03/17/2021 21:19 by Dr. Yoel Box MD.      SCANNED - IMAGING   Final Result          ED Course  ED Course as of Mar 22 0810   Wed Mar 17, 2021   2214 Pending CTA of the chest at this time.  Patient had gotten up to go the bathroom earlier and was without O2 and she desatted to 89% on room air.  She does not wear oxygen at home.    [CW]   2341 No pulmonary embolus on cta. Lll pneumonia. Outpt failure on oral atbs. Call placed to hospitalist at this time for further. 02 sat on 2l 94%    [CW]   2345 Call placed to dr lloyd at this time for further. Reviewed pt and pt care plan with dr hammond- also in agreement with pt care plan    [CW]      ED Course  User Index  [CW] Roya Rebolledo, JULIO CESAR          MDM  Number of Diagnoses or Management Options  Failure of outpatient treatment: minor  Hypoxia: new and requires workup  Pneumonia due to infectious organism, unspecified laterality, unspecified part of lung: minor     Amount and/or Complexity of Data Reviewed  Clinical lab tests: ordered and reviewed  Tests in the radiology section of CPT®: ordered and reviewed    Patient Progress  Patient progress: stable      Final diagnoses:   Pneumonia due to infectious organism, unspecified laterality, unspecified part of lung   Hypoxia   Failure of outpatient treatment          Roya Rebolledo, JULIO CESAR  03/22/21 0811

## 2021-03-18 NOTE — PAYOR COMM NOTE
"Fartun Davies (65 y.o. Female) [id number]   New inpt admit  3/17   [facility]   Please note plan to fax additional  Clinical first thing in am    Awaiting  Dr to round on pt.      alana phone    Fax      Date of Birth Social Security Number Address Home Phone MRN    1955  [id number]   [facility] [address] [phone] [id number]    [employer] Marital Status          Other Legally        Admission Date Admission Type Admitting Provider Attending Provider Department, Room/Bed    3/17/21 Emergency Sean Jett MD Martin, Aribbe Allen, MD [facility] 3C, 387/1    Discharge Date Discharge Disposition Discharge Destination                       Attending Provider: Sean Jett MD    Allergies: Demerol [Meperidine], Dilaudid [Hydromorphone], Hydromorphone Hcl, Morphine Sulfate [Morphine]    Isolation: None   Infection: None   Code Status: Prior    Ht: 167.6 cm (66\")   Wt: 113 kg (250 lb)    Admission Cmt: None   Principal Problem: None                Active Insurance as of 3/17/2021     Primary Coverage     Payor Plan Insurance Group Employer/Plan Group    MEDICARE MEDICARE B ONLY      Payor Plan Address Payor Plan Phone Number Payor Plan Fax Number Effective Dates    [address] [address] [phone]  3/1/2020 - None Entered    [facility] [id number]       Subscriber Name Subscriber Birth Date Member ID       FARTUN DAVIES 1955 [id number]           Secondary Coverage     Payor Plan Insurance Group Employer/Plan Group    [employer] [place] [employer] [place] [id number]     Payor Plan Address Payor Plan Phone Number Payor Plan Fax Number Effective Dates    [address] [address] [phone]  8/1/2016 - None Entered    [facility] [id number]       Subscriber Name Subscriber Birth Date Member ID       FARTUN DAVIES 1955 [id number]                 Emergency Contacts      (Rel.) Home Phone Work Phone Mobile Phone    MayLeonidas (Friend) " [phone] -- --    ANDREW MCKEON (Son) -- -- [phone]              Current Facility-Administered Medications   Medication Dose Route Frequency Provider Last Rate Last Admin   • budesonide-formoterol (SYMBICORT) 160-4.5 MCG/ACT inhaler 2 puff  2 puff Inhalation BID - RT Lyle Kelly MD       • cetirizine (zyrTEC) tablet 10 mg  10 mg Oral Daily Lyle Kelly MD   10 mg at 03/18/21 1116   • cyclobenzaprine (FLEXERIL) tablet 5 mg  5 mg Oral TID Sean Jett MD   5 mg at 03/18/21 0931   • fluticasone (FLONASE) 50 MCG/ACT nasal spray 2 spray  2 spray Each Nare Daily Lyle Kelly MD   2 spray at 03/18/21 1117   • ipratropium-albuterol (DUO-NEB) nebulizer solution 3 mL  3 mL Nebulization 4x Daily - RT Sean Jett MD   3 mL at 03/18/21 1022   • melatonin tablet 9.75 mg  9.75 mg Oral Nightly Sean Jett MD   9.75 mg at 03/18/21 0127   • oxyCODONE-acetaminophen (PERCOCET) 7.5-325 MG per tablet 1 tablet  1 tablet Oral Q8H PRN Sean Jett MD   1 tablet at 03/18/21 1004   • Pharmacy to Dose Zosyn   Does not apply Continuous PRN Lyle Kelly MD       • piperacillin-tazobactam (ZOSYN) 3.375 g in iso-osmotic dextrose 50 ml (premix)  3.375 g Intravenous Q8H Lyle Kelly MD       • sodium chloride 0.9 % flush 10 mL  10 mL Intravenous PRN Sean Jett MD       • vancomycin (VANCOCIN) in iso-osmotic dextrose IVPB 1 g (premix) 200 mL  1,000 mg Intravenous Q12H Lyle Kelly MD         1. No evidence of embolic disease.        2. Pneumonia       After got to 3c  Sat 88 %    o2 1lit      Pneumonia     Ct:        ER :  C/O shortness of air   97.7   79  93%   119/62   resp 15     Doesn't wear o2 at home  Wbc 11.20     pco2 art 50.44   hco3 art 2.91  Art base 2.9     Sat 89%   And 88%    o2 2lit n/c sat 94%  Neb tx   Rocephin  Azithromycin  Solumedrol   Isovue   Iv        Cont pulse ox monitor

## 2021-03-18 NOTE — CONSULTS
PULMONARY & CRITICAL CARE CONSULT - [facility]    21, 15:02 CDT  Patient Care Team:  Sean Jett MD as PCP - General (Family Medicine)  Maria Fernanda Dietrich APRN as Nurse Practitioner (Pulmonary Disease)  Lior Mcknight MD as Consulting Physician (Orthopedic Surgery)  Damian Noriega MD as Consulting Physician (Psychiatry)  Rico Scruggs MD as Anesthesiologist (Pain Medicine)  Kenan Yates MD as Consulting Physician (Gastroenterology)  Maria Fernanda Dietrich APRN as Nurse Practitioner (Pulmonary Disease)  Name: Taylor Davies  : 1955  MRN: [id number]  Contact Serial Number [id number]    Chief complaint:   Shortness of breath  Acute on chronic hypoxic respiratory failure  Acute exacerbation of chronic obstructive pulmonary disease  Left lower lobe pneumonia  Ongoing tobacco abuse  Allergic rhinitis  Morbid obesity  Obstructive sleep apnea  On CPAP treatment  Anxiety and depression.  Chronic pain.    HPI:  We have been consulted by Sean Jett MD to see this 65 y.o. female born on 1955.  Patient is a 65 years old  female who was seen as inpatient pulmonary consult in medical floor today.    She was admitted from the ER last night with increasing shortness of breath and acute hypoxic respiratory failure.  She is an active smoker and keeps smoking on and off for last 4045 years.  She started having shortness of breath about 3 weeks ago and reported she had at least 3 episodes of pneumonia from January which was treated with antibiotics and steroids.  He received outpatient antibiotic coverage and did not improve.  She decided to report to the ER yesterday and initial evaluation shows she has oxygen saturation down in the 70s.  Her lab work showed her blood gas was pH 7.37 PCO2 was 50 and PO2 was 89 with oxygen saturation 94%.  She had no leukocytosis she had she had hyperglycemia but her renal function was normal.  Her Covid screening  test was negative.  D-dimer was elevated and she had a CT angiogram done which showed she had a left lower lobe pulmonary infiltrate suggesting pneumonia.  She was given Rocephin and azithromycin and was admitted in inpatient and currently requiring 2 L oxygen.  This morning at the time of the oxygen study down to 1 L.    She reported having some allergy symptoms cough with scanty expectoration but no fever.  She has morbid obesity and was diagnosed with sleep apnea and she is currently using her home CPAP machine at night.  She did not see any pulmonologist in the past but is planned for the pulmonary function test and schedule her an appointment in pulmonary clinic.  Apparently she had been scheduled to see JULIO CESAR Dawson on several occasions from 2019 but all of her past appointments  has been canceled.    Patient has allergy symptoms with postnasal drainage but did not have any regular allergy medications.  She uses only albuterol inhaler at home.  She is not on home oxygen but uses a CPAP at home.  She did not have any exposure to Covid patient any recent travel or sick contact and has been tested negative for Covid during recent hospitalization.    Past Medical History:   has a past medical history of Anxiety, Anxiety, Arthritis, BMI 45.0-49.9, adult (CMS/[facility]) (10/9/2019), Bradycardia, Calcified granuloma of lung (CMS/[facility]) (10/9/2019), Carotid artery stenosis, Chronic rhinitis (2/24/2020), Colon polyp, COPD (chronic obstructive pulmonary disease) (CMS/[facility]), Depression, Diabetes mellitus (CMS/[facility]), Fibromyalgia, Ground glass opacity present on imaging of lung (11/25/2019), H/O mammogram (03/10/2011), Heartburn, Hyperlipidemia, Hypertension, Kidney stones, Obstructive sleep apnea (10/9/2019), Personal history of nicotine dependence (10/9/2019), PVC (premature ventricular contraction), Sleep apnea, Stroke (CMS/[facility]), TIA (transient ischemic attack) (09/01/2016), Tobacco user (10/9/2019), Vitamin B2  deficiency, and Vitamin C deficiency.   has a past surgical history that includes Pap Smear (03/20/2012); Dilation and curettage of uterus; Gastric bypass; Hernia repair; Cholecystectomy; Hemorrhoid surgery; Cataract extraction (2016); Gastric bypass; Hemorrhoid surgery; Tubal ligation; Colonoscopy (01/20/2005); Esophagogastroduodenoscopy (07/01/2011); Cervical spine surgery; and Kidney stone surgery.  Allergies   Allergen Reactions   • Demerol [Meperidine] Itching   • Dilaudid [Hydromorphone] Itching     Dilaudid-5   • Hydromorphone Hcl Itching   • Morphine Sulfate [Morphine] Itching     Medications:  budesonide-formoterol, 2 puff, Inhalation, BID - RT  cetirizine, 10 mg, Oral, Daily  cyclobenzaprine, 5 mg, Oral, TID  fluticasone, 2 spray, Each Nare, Daily  ipratropium-albuterol, 3 mL, Nebulization, 4x Daily - RT  melatonin, 9.75 mg, Oral, Nightly  piperacillin-tazobactam, 3.375 g, Intravenous, Q8H  vancomycin, 1,000 mg, Intravenous, Q12H      Pharmacy to Dose Zosyn,       Family History:  Family History   Problem Relation Age of Onset   • Diabetes Mother    • Hypertension Mother    • Colon polyps Mother    • Osteoporosis Mother    • Dementia Mother    • Colonic polyp Mother    • Diabetes Father    • Other Father         heart problems   • Colon polyps Father    • Stroke Father    • Dementia Father    • Colonic polyp Father    • Diabetes Sister    • Hypertension Sister    • Diabetes Brother    • Hypertension Brother    • Coronary artery disease Brother    • Diabetes Brother    • Hypertension Brother    • Coronary artery disease Brother    • Diabetes Maternal Grandmother    • Alcohol abuse Maternal Grandmother    • Stroke Maternal Grandmother    • Dementia Maternal Grandmother    • Diabetes Maternal Grandfather    • Diabetes Paternal Grandmother    • Alcohol abuse Paternal Grandmother    • Osteoporosis Paternal Grandmother    • Stroke Paternal Grandmother    • Alcohol abuse Paternal Grandfather    • Breast cancer  Neg Hx    • Ovarian cancer Neg Hx    • Uterine cancer Neg Hx    • Colon cancer Neg Hx    • Melanoma Neg Hx    • Prostate cancer Neg Hx      Social History:   reports that she has been smoking cigarettes. She has been smoking about 0.50 packs per day. She has never used smokeless tobacco. She reports that she does not drink alcohol and does not use drugs.  Review of Systems:  Review of Systems   Constitutional: Positive for fatigue and unexpected weight change. Negative for fever.   HENT: Positive for congestion, postnasal drip and rhinorrhea.    Eyes: Negative.    Respiratory: Positive for cough, chest tightness, shortness of breath and wheezing.    Cardiovascular: Positive for leg swelling. Negative for chest pain and palpitations.   Gastrointestinal: Negative.    Endocrine: Negative.    Genitourinary: Negative.    Musculoskeletal: Positive for arthralgias and back pain.   Skin: Negative.    Allergic/Immunologic: Positive for environmental allergies.   Neurological: Negative.    Hematological: Negative.    Psychiatric/Behavioral: The patient is nervous/anxious.       Physical Exam:  Temp:  [97.4 °F (36.3 °C)-98.3 °F (36.8 °C)] 97.7 °F (36.5 °C)  Heart Rate:  [] 72  Resp:  [15-18] 16  BP: ()/(54-77) 120/61    Intake/Output Summary (Last 24 hours) at 3/18/2021 1502  Last data filed at 3/17/2021 2216  Gross per 24 hour   Intake 100 ml   Output --   Net 100 ml         03/17/21 2004   Weight: 113 kg (250 lb)     SpO2 Percentage    03/18/21 1022 03/18/21 1028 03/18/21 1100   SpO2: 94% 100% 99%     Physical Exam  Constitutional:       Appearance: She is obese. She is ill-appearing.      Comments: Middle-aged  female.   HENT:      Head: Normocephalic and atraumatic.      Nose: Nose normal.      Mouth/Throat:      Mouth: Mucous membranes are moist.      Pharynx: Oropharynx is clear. No oropharyngeal exudate or posterior oropharyngeal erythema.      Comments: Mallampati class IV.  Eyes:      General: No  scleral icterus.     Conjunctiva/sclera: Conjunctivae normal.      Pupils: Pupils are equal, round, and reactive to light.   Cardiovascular:      Rate and Rhythm: Normal rate and regular rhythm.      Pulses: Normal pulses.      Heart sounds: Normal heart sounds. No murmur heard.   No gallop.    Pulmonary:      Effort: Pulmonary effort is normal.      Breath sounds: Normal breath sounds.   Abdominal:      General: Abdomen is flat. Bowel sounds are normal. There is no distension.      Palpations: Abdomen is soft. There is no mass.      Tenderness: There is no abdominal tenderness.   Genitourinary:     Comments: Not done   Musculoskeletal:         General: No swelling or deformity. Normal range of motion.      Cervical back: Normal range of motion. No rigidity or tenderness.      Right lower leg: No edema.      Left lower leg: No edema.   Lymphadenopathy:      Cervical: No cervical adenopathy.   Skin:     General: Skin is warm and dry.      Capillary Refill: Capillary refill takes less than 2 seconds.      Coloration: Skin is not jaundiced.      Findings: No bruising or lesion.   Neurological:      General: No focal deficit present.      Mental Status: She is alert and oriented to person, place, and time. Mental status is at baseline.      Cranial Nerves: No cranial nerve deficit.      Sensory: No sensory deficit.      Motor: No weakness.      Gait: Gait normal.   Psychiatric:         Mood and Affect: Mood normal.         Behavior: Behavior normal.         Thought Content: Thought content normal.       Results from last 7 days   Lab Units 03/17/21 2018   WBC 10*3/mm3 11.20*   HEMOGLOBIN g/dL 12.1   PLATELETS 10*3/mm3 267     Results from last 7 days   Lab Units 03/17/21 2018   SODIUM mmol/L 136   POTASSIUM mmol/L 4.0   CO2 mmol/L 26.0   BUN mg/dL 22   CREATININE mg/dL 0.60   GLUCOSE mg/dL 152*     Results from last 7 days   Lab Units 03/17/21  2145   PH, ARTERIAL pH units 7.370   PCO2, ARTERIAL mm Hg 50.4*   PO2 ART  mm Hg 89.3     Lab Results   Component Value Date    PROBNP 168.2 03/17/2021     Blood Culture   Date Value Ref Range Status   03/17/2021 No growth at less than 24 hours  Preliminary     Recent radiology:   Imaging Results (Last 72 Hours)     Procedure Component Value Units Date/Time    CT Angiogram Chest [[id number]] Collected: 03/18/21 0709     Updated: 03/18/21 0716    Narrative:      CT ANGIOGRAM CHEST- 3/17/2021 10:56 PM CDT      HISTORY: dyspnea/ elevated dimer      COMPARISON: October 26, 2019.      DOSE LENGTH PRODUCT: 569 mGy cm. Automated exposure control was also  utilized to decrease patient radiation dose.     TECHNIQUE: Helical tomographic images of the chest were obtained after  the administration of intravenous contrast following angiogram protocol.  Additionally, 3D and multiplanar reformatted images were provided.        FINDINGS:    Pulmonary arteries: There is adequate enhancement of the pulmonary  arteries to evaluate for central and segmental pulmonary emboli. There  are no filling defects within the main, lobar, segmental or visualized  subsegmental pulmonary arteries. The pulmonary arteries are within  normal limits for size.      Aorta and great vessels: The aorta is well opacified and demonstrates no  dissection or aneurysm. The great vessels are normal in appearance.     Visualized neck base: The imaged portion of the base of the neck appears  unremarkable.      Lungs: Airspace filling infiltrates present in the left lower lobe.  Groundglass densities are noted in the middle lobe on the right. A few  focal consolidation and nodular densities are present in the right lower  lobe. The changes are most consistent with pneumonia. The trachea and  bronchial tree are patent.      Heart: Cardiac silhouette is mildly enlarged. There is no pericardial  effusion.      Mediastinum and lymph nodes: No enlarged mediastinal, hilar, or axillary  lymph nodes are present.      Skeletal and soft tissues: The  osseous structures of the thorax and  surrounding soft tissues demonstrate no acute process.     Upper abdomen: Small hiatal hernia is present. Postsurgical changes  noted at the esophagogastric junction and fundus the stomach.  Cholecystectomy clips are present.        Impression:      1. No evidence of embolic disease.        2. Pneumonia     3 these images initially reviewed by stat rad March 17 23:38 hours.        This report was finalized on 03/18/2021 07:13 by Dr. Rayshawn Ramirez MD.    XR Chest 2 View [[id number]] Collected: 03/17/21 2116     Updated: 03/17/21 2122    Narrative:      EXAMINATION:  XR CHEST 2 VW-  3/17/2021 9:05 PM CDT     HISTORY: SOA Triage Protocol     COMPARISON: 3/6/2021 and 1/18/2021.     TECHNIQUE: 2 views were obtained.     FINDINGS:  Coarse markings and bronchial wall thickening remain stable.  There is stable patchy infiltrate in both lung bases left greater than  right. The heart is normal in size. There are degenerative changes of  the spine and shoulders. There is thoracic scoliosis to the right. There  has been prior cervical fusion.       Impression:      1. Stable infiltrates in both lung bases left greater than right. This  could be acute or chronic. It appears relatively stable compared back to  1/18/2021.  2. Stable bronchial wall thickening.         This report was finalized on 03/17/2021 21:19 by Dr. Yoel Box MD.        My radiograph interpretation/independent review of imaging: Reviewed agree with current interpretation.  Other test results (not lab or imaging): Results for orders placed during the hospital encounter of 10/26/19    Adult Transthoracic Echo Limited W/ Cont if Necessary Per Protocol    Interpretation Summary  · Left ventricular systolic function is hyperdynamic (EF > 70).  · Normal size and function of the right ventricle.  · No significant valvular pathology.  Reviewed  Independent review of ekg: Done  Problem List as identified by Epic (may contain  historical, inactive problems)  Patient Active Problem List   Diagnosis   • Essential hypertension   • Type 2 diabetes mellitus with diabetic polyneuropathy, without long-term current use of insulin (CMS/[facility])   • Fibromyalgia   • Chronic midline low back pain without sciatica   • Obstructive sleep apnea treated with BiPAP   • Cigarette smoker   • Chronic rhinitis   • Bipolar I disorder with anxious distress (CMS/[facility])   • Class 3 severe obesity due to excess calories with serious comorbidity and body mass index (BMI) of 40.0 to 44.9 in adult (CMS/[facility])   • Benzodiazepine dependence (CMS/[facility])   • Uncomplicated opioid dependence (CMS/[facility])   • Atelectasis   • Hx of colonic polyp   • Family hx colonic polyps   • Incontinence of feces   • Nausea   • Personal history of nicotine dependence   • Family history of polyps in the colon   • History of colon polyps   • Pneumonia due to infectious organism     Pulmonary Assessment:  New problem (to me), with additional workup planned: Acute on chronic hypoxic respiratory failure.  Acute exacerbation of COPD.  Left lower lobe community-acquired pneumonia.  Ongoing tobacco abuse,  New problem (to me), no additional workup planned: Allergic rhinosinusitis, morbid obesity, obstructive sleep apnea on CPAP treatment.  Other problems either stable, failing to improve or worsenin. Acute on chronic respiratory failure  2. Acute hypoxic respiratory failure requiring supplemental oxygen.  3. Left lower lobe pneumonia  4. Acute exacerbation of chronic obstructive pulmonary disease  5. Ongoing tobacco abuse  6. Allergic rhinitis  7. Anxiety and depression  8. Chronic pain  9. Morbid obesity  10. Obstructive sleep apnea on CPAP treatment    Recommend/plan:   · Patient already had multiple rounds of antibiotic as an outpatient and she presented this time with a left lower lobe pneumonia and I started her on broad-spectrum antibiotic with both gram-positive and gram-negative coverage with  Zosyn and vancomycin.  Dose will be adjusted per pharmacy.  · Sputum culture will be obtained and patient will need urine for Legionella pneumococcal antigen.  Mycoplasma antigen is also ordered.  · Antibiotic coverage will be adjusted according to culture results.  · Patient has COPD from ongoing tobacco abuse.  She was started on Symbicort and DuoNeb with albuterol nebulizer inhaler as needed.  She was started on Solu-Medrol which will be tapered slowly.  · Plan for incentive spirometry and flutter valve to improve pulmonary compliance and clearance.  · Patient will need to start on cetirizine and Flonase for nasal allergy.  · She has morbid obesity and has obstructive sleep apnea.  She will continue doing the home CPAP.  · Nicotine patch will be added for tobacco abuse.  · Patient will need patient pulmonary function test and follow-up in the pulmonary clinic.  She was scheduled multiple times for the last few years and had to cancel and obviously there is a noncompliance pattern noted which needs to be addressed at the time of discharge.  · Smoke incisions discussed with the patient.  She will need to continue her CPAP for now and further work-up may be needed for underlying sleep apnea.  · Continue oxygen and titrate to keep oxygen more than 2%.  She will need DVT and ulcer prophylaxis and will be started on Lovenox and Pepcid.  · Repeat labs will be done tomorrow morning.  At this moment I do not feel she will need another blood gas.  She has marginal hypercapnia during the last blood gas which could related to her underlying obstructive sleep apnea.  · Continue physical activity, and nutritional support.  Repeat labs and imaging studies from time to time.  · CODE STATUS: Full.  Pulmonary team will continue following her and make further recommendations.  · Total time spent in seeing this patient is a pulmonary inpatient consult was 45 minutes.    Thank you for this consult.  We will follow  along.    Electronically signed by     Lyle Kelly MD,   Pulmonologist/intensivist.    03/18/21, 3:02 PM CDT.

## 2021-03-18 NOTE — PROGRESS NOTES
"Pharmacy Dosing Service  Pharmacokinetics  Vancomycin, Zosyn Initial Evaluation  Assessment/Action/Plan:  Loading dose: 1500mg  Followed by: Vancomycin 1000 mg IVPB every 12 hours  Current end date:3/25/21  Additional antimicrobial agent(s): Zosyn    Vancomycin dosage initiated based on population pharmacokinetic parameters. Zosyn dosed at 3.375g IV once over 30 minutes loading dose followed by 3.375g IV q8 via extended infusion per protocol based on indication and renal function. Pharmacy will continue to follow daily and adjust dose accordingly.     Subjective:  Taylor Davies is a 65 y.o. female with a Vancomycin \"Pharmacy to Dose\" consult for the treatment of pneumonia , day 1 of 7 of treatment.    AUC Model Data:  Loading dose: 1500 mg at 11:00 03/18/2021.  Regimen: 1000 mg every 12 hours   Start time: 23:00 on 03/18/2021  Exposure target: AUC24 (range)400-600 mg/L.hr  AUC24,ss: 453 mg/L.hr  PAUC*: 59 %  Ctrough,ss: 12.5 mg/L  Pconc*: 27 %  Tox.: 8 %    Objective:  Ht: 167.6 cm (66\"); Wt: 113 kg (250 lb)  Estimated Creatinine Clearance: 89.4 mL/min (by C-G formula based on SCr of 0.6 mg/dL).   Creatinine   Date Value Ref Range Status   03/17/2021 0.60 0.57 - 1.00 mg/dL Final   03/06/2021 0.61 0.57 - 1.00 mg/dL Final      Lab Results   Component Value Date    WBC 11.20 (H) 03/17/2021    WBC 10.13 03/06/2021      Baseline culture results:  Microbiology Results (last 10 days)       Procedure Component Value - Date/Time    COVID PRE-OP / PRE-PROCEDURE SCREENING ORDER (NO ISOLATION) - Swab, Nasopharynx [[id number]]  (Normal) Collected: 03/17/21 2151    Lab Status: Final result Specimen: Swab from Nasopharynx Updated: 03/17/21 2224    Narrative:      The following orders were created for panel order COVID PRE-OP / PRE-PROCEDURE SCREENING ORDER (NO ISOLATION) - Swab, Nasopharynx.  Procedure                               Abnormality         Status                     ---------                               ----------- "         ------                     COVID-19 and FLU A/B PCR...[[id number]]  Normal              Final result                 Please view results for these tests on the individual orders.    COVID-19 and FLU A/B PCR - Swab, Nasopharynx [[id number]]  (Normal) Collected: 03/17/21 2151    Lab Status: Final result Specimen: Swab from Nasopharynx Updated: 03/17/21 2224     COVID19 Not Detected     Influenza A PCR Not Detected     Influenza B PCR Not Detected    Narrative:      Fact sheet for providers: https://www.fda.gov/media/311330/download    Fact sheet for patients: https://www.fda.gov/media/388953/download    Test performed by PCR.            MATTHEW Isaac, Ash  03/18/21 09:55 CDT

## 2021-03-18 NOTE — PLAN OF CARE
Goal Outcome Evaluation:  Plan of Care Reviewed With: patient  Progress: no change  Outcome Summary: Pt identified at nutrition risk r/t reports of weight loss. She is unsure how much weight she has lost. She says that her appetite has been reduced periodically since December d/t 3 bouts of PNA. Her weight records indicate that she has lost ~5# in 5 months and 17# in the last year. Her BMI is 40.35. She says that she actually needs to lose at least 60#. Advised pt that she did not need to lose the weight unintentionally from a poor appetite d/t illness. Encouraged pt to monitor her weight and po intake going forward. Advised her of alternate selections as needed.

## 2021-03-18 NOTE — PLAN OF CARE
Goal Outcome Evaluation:  Plan of Care Reviewed With: patient     Outcome Summary: Pt on room air. Pt using incentive spirometer and flutter valve. Pt reports moderate to severe pain. Pt receiving Percocet for pain. IV abx. Pt voiding. Pt up with assistance. VSS.

## 2021-03-19 NOTE — PROGRESS NOTES
PULMONARY AND CRITICAL CARE PROGRESS NOTE - [facility]    Patient: Taylor CASTRO [birth year]  1955   MR# [id number]   Acct# [id number]  03/19/21   07:19 CDT  Referring Provider: Sean Jett MD    Chief Complaint: Acute on chronic hypoxemic respiratory failure and left lower lobe pneumonia    Interval history: The patient received cyclobenzaprine, gabapentin, melatonin and clonazepam as well as Percocet overnight.  At the time of my visit, the patient is sleeping soundly on her home CPAP with a sat of 97%.  No other overnight events reported.    Meds:  budesonide-formoterol, 2 puff, Inhalation, BID - RT  cetirizine, 10 mg, Oral, Daily  clonazePAM, 1 mg, Oral, TID  cyclobenzaprine, 5 mg, Oral, TID  DULoxetine, 90 mg, Oral, Daily  enoxaparin, 40 mg, Subcutaneous, Q24H  famotidine, 20 mg, Oral, BID  fluticasone, 2 spray, Each Nare, Daily  gabapentin, 800 mg, Oral, Q8H  insulin lispro, 2-7 Units, Subcutaneous, TID AC  ipratropium-albuterol, 3 mL, Nebulization, 4x Daily - RT  melatonin, 9.75 mg, Oral, Nightly  methylPREDNISolone sodium succinate, 40 mg, Intravenous, Q8H  nicotine, 1 patch, Transdermal, Q24H  piperacillin-tazobactam, 3.375 g, Intravenous, Q8H  vancomycin, 1,000 mg, Intravenous, Q12H      Pharmacy to Dose Zosyn,       Review of Systems:   Review of Systems   Respiratory: Positive for cough, chest tightness, shortness of breath and wheezing.    Musculoskeletal:        Chronic pain   Psychiatric/Behavioral:        Chronic anxiety     Physical Exam:  SpO2 Percentage    03/18/21 2300 03/18/21 2340 03/19/21 0713   SpO2: 96% 93% 94%     Temp:  [97.4 °F (36.3 °C)-98.2 °F (36.8 °C)] 98.2 °F (36.8 °C)  Heart Rate:  [57-88] 57  Resp:  [16-18] 16  BP: (110-133)/(59-66) 133/64    Intake/Output Summary (Last 24 hours) at 3/19/2021 0719  Last data filed at 3/18/2021 1852  Gross per 24 hour   Intake 200 ml   Output 500 ml   Net -300 ml     Physical Exam  Vitals and nursing note reviewed.      Constitutional:       Appearance: She is well-developed. She is obese.      Comments: CPAP in place   HENT:      Head: Normocephalic and atraumatic.   Eyes:      Pupils: Pupils are equal, round, and reactive to light.   Cardiovascular:      Rate and Rhythm: Normal rate and regular rhythm.   Pulmonary:      Breath sounds: No wheezing, rhonchi or rales.   Abdominal:      General: Bowel sounds are normal. There is no distension.      Palpations: Abdomen is soft.   Musculoskeletal:         General: Normal range of motion.      Cervical back: Normal range of motion and neck supple.   Skin:     General: Skin is warm and dry.   Neurological:      Mental Status: She is alert and oriented to person, place, and time.       Laboratory Data:  Results from last 7 days   Lab Units 03/19/21  0324 03/17/21 2018   WBC 10*3/mm3 13.93* 11.20*   HEMOGLOBIN g/dL 11.1* 12.1   PLATELETS 10*3/mm3 279 267     Results from last 7 days   Lab Units 03/19/21  0324 03/17/21 2018   SODIUM mmol/L 139 136   POTASSIUM mmol/L 4.0 4.0   BUN mg/dL 23 22   CREATININE mg/dL 0.50* 0.60     Results from last 7 days   Lab Units 03/17/21  2145   PH, ARTERIAL pH units 7.370   PCO2, ARTERIAL mm Hg 50.4*   PO2 ART mm Hg 89.3     Blood Culture   Date Value Ref Range Status   03/17/2021 No growth at 24 hours  Preliminary   03/17/2021 No growth at 24 hours  Preliminary     Recent films:  XR Chest 2 View    Result Date: 3/17/2021  1. Stable infiltrates in both lung bases left greater than right. This could be acute or chronic. It appears relatively stable compared back to 1/18/2021. 2. Stable bronchial wall thickening.   This report was finalized on 03/17/2021 21:19 by Dr. Yoel Box MD.    CT Angiogram Chest    Result Date: 3/18/2021  1. No evidence of embolic disease.   2. Pneumonia  3 these images initially reviewed by stat rad March 17 23:38 hours.   This report was finalized on 03/18/2021 07:13 by Dr. Rayshawn Ramirez MD.    Films reviewed personally by  me.  My interpretation: No new 3-19-21    Pulmonary Assessment:  1. Acute on chronic hypercapnic respiratory failure  2. Acute exacerbation of COPD  3. Left lower lobe community-acquired pneumonia  4. Ongoing tobacco abuse  5. Allergic rhinitis  6. ISABEL on CPAP  7. Anxiety and depression  8. Chronic pain on narcotic therapy  9. Morbid obesity    Recommend:   · Continue home NIPPV device for all sleep  · Otherwise, supplemental oxygen to keep O2 sats greater than 90%.  Avoid over oxygenation due to hypercapnia.  · Continue broad-spectrum antibiotic coverage with Zosyn and vancomycin.  · Continue pulmonary regimen with Symbicort, DuoNeb and incentive  · Nicotine replacement patch.  Smoking cessation will be essential to avoid hospital readmissions.  · Would recommend judicious use of any additional sedatives given the vast amount of home medications the patient already takes for pain and anxiety.  · Sputum culture pending collection  · Legionella and strep pneumo negative  · Decrease IV Solu-Medrol as tolerated.  Currently receiving 40 mg 3 times a day.  · DVT prophylaxis with Lovenox  · Stress ulcer prophylaxis with Pepcid  · Mobilize as tolerated.  Would recommend that she be up to the chair as much as possible during the day.    Electronically signed by JULIO CESAR Verma, 03/19/21, 07:19 CDT     ATTESTATION OF CLINICAL NOTE:  I have reviewed the notes, assessments, and/or procedures performed by JULIO CESAR Morales, I concur with her/his documentation of Taylor CASTRO May.  She was seen in follow-up visit in medical floor today.  She is using her home CPAP and currently on supplemental oxygen 2 L.  She is in too much pain medications and gets little drowsy and exhausted.  She was more alert at the time of my visit this afternoon.    She has no other new complaints overnight and remains afebrile.  In reviewing the records it appears patient canceled multiple appointments in the pulmonary clinic and  on asking she said that she was scared to know what is going on with the lung and did not show up and now she wanted to get a follow-up when she is ready for discharge.    On physical examination patient is obese  female sitting up in the chair in no acute distress.  HEENT: Atraumatic normocephalic.  Neck: Supple no mass nuclear venous distention.  Heart: Normal sinus rhythm.  Lungs: Diminished air entry in both lungs.  Abdomen: Soft nondistended nontender.  Extremities: No edema normal pulses normal color.  Neurologic: Grossly intact.  Skin: No breakdown.    Patient will continue on IV Solu-Medrol and switch to oral prednisone taper at the time of discharge for COPD exacerbation.  Continue Symbicort DuoNeb and supportive respiratory care.  Continue senna spirometry.  Continue CPAP at night.  Plan for outpatient pulmonary function test and sleep study when she is more stable.  Pneumonia work-up has been negative so far.  Monitor chest x-ray for pulmonary infiltrate.  Continue antibiotic coverage for now and adjust antibiotics according to culture results and she may be discharged home on oral doxycycline and Augmentin when she is ready to discharge home.  Continue PT OT and nutritional support.  DVT and stress ulcer prophylaxis.  Plan for outpatient pulmonary clinic follow-up as discussed.  Continue nicotine patch.  Repeat labs and imaging studies from time to time.  Pulmonary team will continue following and make further recommendations.    I have seen and examined patient personally, performing a face-to-face diagnostic evaluation with plan of care reviewed and developed with APRN and nursing staff. I have addended and/or modified the above history of present illness, physical examination, and assessment and plan to reflect my findings and impressions. Essential elements of the care plan were discussed with APRN above.  Agree with findings and assessment/plan as documented above.    Lyle Kelly,  MD  Pulmonologist/Intensivist  3/19/2021 17:32 CDT

## 2021-03-19 NOTE — PROGRESS NOTES
"Progress Note  Taylor CASTRO May  3/19/2021 09:53 CDT  Subjective:   Admit Date:   3/17/2021      CC/ADMIT DX:       Interval History:   Reviewed overnight events and nursing notes.  No new complaints> No CP. Her SOA, cough are stable, to improved.       I have reviewed all labs/diagnostics from the last 24hrs.       ROS:   I have done a 10 point ROS and all are negative, except what is mentioned in the HPI.    Diet Regular; Cardiac, Consistent Carbohydrate    Medications:   Pharmacy to Dose Zosyn,       budesonide-formoterol, 2 puff, Inhalation, BID - RT  cetirizine, 10 mg, Oral, Daily  clonazePAM, 1 mg, Oral, TID  cyclobenzaprine, 5 mg, Oral, TID  DULoxetine, 90 mg, Oral, Daily  enoxaparin, 40 mg, Subcutaneous, Q24H  famotidine, 20 mg, Oral, BID  fluticasone, 2 spray, Each Nare, Daily  gabapentin, 800 mg, Oral, Q8H  insulin lispro, 2-7 Units, Subcutaneous, TID AC  ipratropium-albuterol, 3 mL, Nebulization, 4x Daily - RT  melatonin, 9.75 mg, Oral, Nightly  methylPREDNISolone sodium succinate, 40 mg, Intravenous, Q12H  nicotine, 1 patch, Transdermal, Q24H  piperacillin-tazobactam, 3.375 g, Intravenous, Q8H  vancomycin, 1,000 mg, Intravenous, Q12H            Objective:   Vitals: /52 (BP Location: Right arm, Patient Position: Lying)   Pulse 50   Temp 97.4 °F (36.3 °C) (Oral)   Resp 16   Ht 167.6 cm (66\")   Wt 113 kg (250 lb)   LMP 02/11/2010 (Approximate)   SpO2 96%   BMI 40.35 kg/m²      Intake/Output Summary (Last 24 hours) at 3/19/2021 0953  Last data filed at 3/18/2021 1852  Gross per 24 hour   Intake 200 ml   Output 500 ml   Net -300 ml     General appearance: alert and cooperative with exam  Lungs: coarse in bases  Heart: RRR  Abdomen: soft, non-tender; bowel sounds normal; no masses,  no organomegaly  Extremities: extremities normal, atraumatic, no cyanosis or edema  Neurologic:  No obvious focal neurologic deficits.    Assessment and Plan:     Pneumonia due to infectious organism    COPD    Tobacco " Abuse    Obesity   ISABEL    DM2    HTN    Chronic Pain    Plan:  1.  Continue present medication(s)   2.  Follow with Pulm  3.  Wean IV steroids  4.  Follow with cultures  5.  Increase activity      Discharge planning:   her home     Reviewed treatment plans with the patient and/or family.             Electronically signed by Sean Jett MD on 3/19/2021 at 09:53 CDT

## 2021-03-19 NOTE — PLAN OF CARE
Pt agitated at the beginning of shift, scheduled cyclobenzaprine, gabapentin, melatonin, and clonazepam given which allowed the pt relax and sleep for majority of the shift. Pt disoriented when she wakes up, bed check in place. IVF infusing per order. Lung sounds diminished, but otherwise clear. Bipap on, sating mid to upper 90s. Percocet given for pain, with noted relief. No distress noted.

## 2021-03-19 NOTE — PROGRESS NOTES
Pharmacy Dosing Service  Antimicrobials  Zosyn    Assessment/Action/Plan:  Pharmacy to dose Zosyn for the indication of pneumonia. Zosyn dose adjusted to 4.5g iv q8h (extended infusion). Pharmacy will continue to monitor renal function and adjust dose accordingly. Current end date: 3/25/2021     Subjective:  Taylor Davies is a 65 y.o. female currently being treated for pneumonia, day 2 of therapy.    Objective:  Estimated Creatinine Clearance: 89.4 mL/min (A) (by C-G formula based on SCr of 0.5 mg/dL (L)).   Creatinine   Date Value Ref Range Status   03/19/2021 0.50 (L) 0.57 - 1.00 mg/dL Final   03/17/2021 0.60 0.57 - 1.00 mg/dL Final   03/06/2021 0.61 0.57 - 1.00 mg/dL Final   07/31/2019 0.6 0.5 - 0.9 mg/dL Final     Lab Results   Component Value Date    WBC 13.93 (H) 03/19/2021     Temp Readings from Last 1 Encounters:   03/19/21 97.4 °F (36.3 °C) (Oral)       Culture Results:  Microbiology Results (last 10 days)       Procedure Component Value - Date/Time    Legionella Antigen, Urine - Urine, Urine, Clean Catch [[id number]]  (Normal) Collected: 03/18/21 1809    Lab Status: Final result Specimen: Urine, Clean Catch Updated: 03/18/21 1903     LEGIONELLA ANTIGEN, URINE Negative    S. Pneumo Ag Urine or CSF - Urine, Urine, Clean Catch [[id number]]  (Normal) Collected: 03/18/21 1809    Lab Status: Final result Specimen: Urine, Clean Catch Updated: 03/18/21 1904     Strep Pneumo Ag Negative    COVID PRE-OP / PRE-PROCEDURE SCREENING ORDER (NO ISOLATION) - Swab, Nasopharynx [[id number]]  (Normal) Collected: 03/17/21 2151    Lab Status: Final result Specimen: Swab from Nasopharynx Updated: 03/17/21 2224    Narrative:      The following orders were created for panel order COVID PRE-OP / PRE-PROCEDURE SCREENING ORDER (NO ISOLATION) - Swab, Nasopharynx.  Procedure                               Abnormality         Status                     ---------                               -----------         ------                      COVID-19 and FLU A/B PCR...[[id number]]  Normal              Final result                 Please view results for these tests on the individual orders.    COVID-19 and FLU A/B PCR - Swab, Nasopharynx [[id number]]  (Normal) Collected: 03/17/21 2151    Lab Status: Final result Specimen: Swab from Nasopharynx Updated: 03/17/21 2224     COVID19 Not Detected     Influenza A PCR Not Detected     Influenza B PCR Not Detected    Narrative:      Fact sheet for providers: https://www.fda.gov/media/923931/download    Fact sheet for patients: https://www.fda.gov/media/709019/download    Test performed by PCR.    Blood Culture With MANAN - Blood, Arm, Left [[id number]] Collected: 03/17/21 2146    Lab Status: Preliminary result Specimen: Blood from Arm, Left Updated: 03/18/21 2200     Blood Culture No growth at 24 hours    Blood Culture - Blood, [[id number]] Collected: 03/17/21 2018    Lab Status: Preliminary result Specimen: Blood Updated: 03/19/21 0015     Blood Culture No growth at 24 hours          Current Antimicrobials:   Anti-Infectives (From admission, onward)      Ordered     Dose/Rate Route Frequency Start Stop    03/19/21 1137  piperacillin-tazobactam (ZOSYN) 4.5 g in iso-osmotic dextrose 100 mL IVPB (premix)     Ordering Provider: Sean Jett MD    4.5 g  over 4 Hours Intravenous Every 8 Hours 03/19/21 1700 03/25/21 1659    03/18/21 0934  vancomycin (VANCOCIN) in iso-osmotic dextrose IVPB 1 g (premix) 200 mL     Ordering Provider: Lyle Kelly MD    1,000 mg Intravenous Every 12 Hours 03/18/21 2300 03/25/21 2259    03/18/21 0934  piperacillin-tazobactam (ZOSYN) 3.375 g in iso-osmotic dextrose 50 ml (premix)     Ordering Provider: Lyle Kelly MD    3.375 g  over 30 Minutes Intravenous Once 03/18/21 1100 03/18/21 1147    03/18/21 0952  vancomycin 1500 mg/500 mL 0.9% NS IVPB ([facility])     Ordering Provider: Sean Jett MD    1,500 mg  over 3 Hours Intravenous Once 03/18/21 1100 03/18/21 2628     03/18/21 0934  Pharmacy to Dose Zosyn     Ordering Provider: Lyle Kelly MD     Does not apply Continuous PRN 03/18/21 0933 03/25/21 0932    03/17/21 2129  cefTRIAXone (ROCEPHIN) 1 g/100 mL 0.9% NS (MBP)     Ordering Provider: Roya Rebolledo APRN    1 g  over 30 Minutes Intravenous Once 03/17/21 2131 03/17/21 2216 03/17/21 2129  AZITHROMYCIN 500 MG/250 ML 0.9% NS IVPB (vial-mate)     Ordering Provider: Roya Rebolledo APRN    500 mg  over 60 Minutes Intravenous Once 03/17/21 2131 03/17/21 2349            Bruce Restrepo, PharmD  03/19/21 11:38 CDT

## 2021-03-19 NOTE — PAYOR COMM NOTE
"3/19 CLINICAL UPDATE  UR  [phone]  HAS MEDICARE B ONLY    Fartun Davies (65 y.o. Female)     Date of Birth Social Security Number Address Home Phone MRN    1955  [address] [facility] [address]  [facility] [address] [phone] [id number]    [place] Marital Status          Other Legally        Admission Date Admission Type Admitting Provider Attending Provider Department, Room/Bed    3/17/21 Emergency Sean Jett MD Martin, Aribbe Allen, MD [facility] 3C, 387/1    Discharge Date Discharge Disposition Discharge Destination                       Attending Provider: Sean Jett MD    Allergies: Demerol [Meperidine], Dilaudid [Hydromorphone], Hydromorphone Hcl, Morphine Sulfate [Morphine]    Isolation: None   Infection: None   Code Status: Prior    Ht: 167.6 cm (66\")   Wt: 113 kg (250 lb)    Admission Cmt: None   Principal Problem: None                Active Insurance as of 3/17/2021     Primary Coverage     Payor Plan Insurance Group Employer/Plan Group    MEDICARE MEDICARE B ONLY      Payor Plan Address Payor Plan Phone Number Payor Plan Fax Number Effective Dates    [address] [address] [phone]  3/1/2020 - None Entered    [facility] [id number]       Subscriber Name Subscriber Birth Date Member ID       FARTUN DAVIES 1955 [id number]           Secondary Coverage     Payor Plan Insurance Group Employer/Plan Group    [employer] [place] [employer] [place] [id number]     Payor Plan Address Payor Plan Phone Number Payor Plan Fax Number Effective Dates    [address] [address] [phone]  8/1/2016 - None Entered    [facility] [id number]       Subscriber Name Subscriber Birth Date Member ID       FARTUN DAVIES 1955 [id number]                 Emergency Contacts      (Rel.) Home Phone Work Phone Mobile Phone    Leonidas Davies (Friend) [phone] -- --    ANDREW MCKEON (Son) -- -- [phone]               History & Physical      Sean Jett MD at 03/18/21 1355         "      History and Physical      CHIEF COMPLAINT:  SOA, Cough    Reason for Admission:  Acute Resp Failure with Hypoxia, Infiltrate on CVXR    History Obtained From:  Patient, chart    HISTORY OF PRESENT ILLNESS:      The patient is a 65 y.o. female who was admitted from ER with increasing SOA, cough. She has no fever. She has had multiple courses of antibiotics and steroids since the end of 2020. She is a smoker. No GI complaints. No dysuria.     Past Medical History:    Past Medical History:   Diagnosis Date   • Anxiety    • Anxiety    • Arthritis    • BMI 45.0-49.9, adult (CMS/[facility]) 10/9/2019   • Bradycardia    • Calcified granuloma of lung (CMS/[facility]) 10/9/2019    Right upper lobe   • Carotid artery stenosis    • Chronic rhinitis 2/24/2020   • Colon polyp    • COPD (chronic obstructive pulmonary disease) (CMS/[facility])    • Depression    • Diabetes mellitus (CMS/[facility])    • Fibromyalgia    • Ground glass opacity present on imaging of lung 11/25/2019   • H/O mammogram 03/10/2011    within normal limits   • Heartburn    • Hyperlipidemia    • Hypertension    • Kidney stones    • Obstructive sleep apnea 10/9/2019   • Personal history of nicotine dependence 10/9/2019   • PVC (premature ventricular contraction)    • Sleep apnea    • Stroke (CMS/[facility])    • TIA (transient ischemic attack) 09/01/2016   • Tobacco user 10/9/2019   • Vitamin B2 deficiency    • Vitamin C deficiency      Past Surgical History:    Past Surgical History:   Procedure Laterality Date   • CATARACT EXTRACTION  2016   • CERVICAL SPINE SURGERY     • CHOLECYSTECTOMY     • COLONOSCOPY  01/20/2005    mixed hyperplastic-adenomatous polyp, severe internal hemorrhoids, non-bleeding   • DILATATION AND CURETTAGE     • ENDOSCOPY  07/01/2011    Status post gastric bypass, candida   • GASTRIC BYPASS     • GASTRIC BYPASS     • HEMORRHOIDECTOMY     • HEMORRHOIDECTOMY     • HERNIA REPAIR     • KIDNEY STONE SURGERY     • PAP SMEAR  03/20/2012    within normal limits   • TUBAL  ABDOMINAL LIGATION           Medications Prior to Admission:    Medications Prior to Admission   Medication Sig Dispense Refill Last Dose   • aspirin 81 MG EC tablet Take 1 tablet by mouth Daily. 90 tablet 3    • busPIRone (BUSPAR) 7.5 MG tablet Take 7.5 mg by mouth 2 (Two) Times a Day.      • Cholecalciferol (VITAMIN D3) 5000 UNITS capsule capsule Take 10,000 Units by mouth daily.      • clonazePAM (KlonoPIN) 1 MG tablet Take 1 mg by mouth 3 (Three) Times a Day.      • cloNIDine (CATAPRES) 0.1 MG tablet Take 0.1 mg by mouth 2 (Two) Times a Day.      • cyclobenzaprine (FLEXERIL) 10 MG tablet Take 5 mg by mouth.      • doxycycline (MONODOX) 100 MG capsule Take 1 capsule by mouth 2 (Two) Times a Day. 20 capsule 0    • DULoxetine (CYMBALTA) 30 MG capsule Take 30 mg by mouth Daily. Take along with the 60 MG to equal 90 MG      • DULoxetine (CYMBALTA) 60 MG capsule Take 90 mg by mouth.      • fluticasone (FLONASE) 50 MCG/ACT nasal spray 2 sprays into the nostril(s) as directed by provider Daily. 18.2 mL 0    • gabapentin (NEURONTIN) 800 MG tablet Take 800 mg by mouth 3 (Three) Times a Day.      • melatonin 5 MG tablet tablet Take 10 mg by mouth Every Night.      • Multiple Vitamin (MULTI VITAMIN DAILY PO) Take 1 tablet by mouth daily.      • oxyCODONE-acetaminophen (PERCOCET) 7.5-325 MG per tablet Take 1 tablet by mouth Every 8 (Eight) Hours As Needed.          Allergies:  Demerol [meperidine], Dilaudid [hydromorphone], Hydromorphone hcl, and Morphine sulfate [morphine]    Social History:   TOBACCO:   reports that she has been smoking cigarettes. She has been smoking about 0.50 packs per day. She has never used smokeless tobacco.  ETOH:   reports no history of alcohol use.  DRUGS:   reports no history of drug use.  MARITAL STATUS:    OCCUPATION:  Not working  Patient currently lives with Family      Family History:   Family History   Problem Relation Age of Onset   • Diabetes Mother    • Hypertension Mother    •  "Colon polyps Mother    • Osteoporosis Mother    • Dementia Mother    • Colonic polyp Mother    • Diabetes Father    • Other Father         heart problems   • Colon polyps Father    • Stroke Father    • Dementia Father    • Colonic polyp Father    • Diabetes Sister    • Hypertension Sister    • Diabetes Brother    • Hypertension Brother    • Coronary artery disease Brother    • Diabetes Brother    • Hypertension Brother    • Coronary artery disease Brother    • Diabetes Maternal Grandmother    • Alcohol abuse Maternal Grandmother    • Stroke Maternal Grandmother    • Dementia Maternal Grandmother    • Diabetes Maternal Grandfather    • Diabetes Paternal Grandmother    • Alcohol abuse Paternal Grandmother    • Osteoporosis Paternal Grandmother    • Stroke Paternal Grandmother    • Alcohol abuse Paternal Grandfather    • Breast cancer Neg Hx    • Ovarian cancer Neg Hx    • Uterine cancer Neg Hx    • Colon cancer Neg Hx    • Melanoma Neg Hx    • Prostate cancer Neg Hx      REVIEW OF SYSTEMS:  Constitutional: neg  CV: neg  Pulmonary: cough, SOA  GI: neg  : neg  Psych: neg  Neuro: neg  Skin: neg  MusculoSkeletal: neg  HEENT: neg  Joints: neg  Vitals:  /59 (BP Location: Right arm, Patient Position: Lying)   Pulse 75   Temp 98.1 °F (36.7 °C) (Oral)   Resp 16   Ht 167.6 cm (66\")   Wt 113 kg (250 lb)   LMP 02/11/2010 (Approximate)   SpO2 95%   BMI 40.35 kg/m²     PHYSICAL EXAM:  Gen: NAD, alert, pleasant  HEENT: WNL  Lymph: no LAD  Neck: no JVD or masses  Chest: coarse  CV: RRR  Abdomen: NT/ND  Extrem: no C/C/E  Neuro: non focal  Skin: no rashes  Joints: no redness  DATA:  I have reviewed the admission labs and imaging tests.    ASSESSMENT AND PLAN:      Acute Resp Failure with Hypoxia, Infiltrate on CXR----continue O2, antibiotics, steroids, follow with Pulm  Chronic Pain   DM2---follow with glucose  COPD, Tobacco Abuse  Obesity, ISABEL    Sean Jett MD  20:06 CDT 3/18/2021    Electronically signed by " Sean Jett MD at 03/18/21 2010       Vital Signs (last 2 days)     Date/Time   Temp   Temp src   Pulse   Resp   BP   Patient Position   SpO2    03/18/21 2340   98.2 (36.8)   Oral   67   16   133/64   Lying   93    03/18/21 2300   --   --   72   --   --   --   96    03/1955   98.1 (36.7)   Oral   75   16   117/59   Lying   95    03/18/21 1555   97.6 (36.4)   Oral   88   16   129/66   Lying   96    03/18/21 1549   --   --   79   16   --   --   --    03/18/21 1542   --   --   80   16   --   --   96    03/18/21 1100   97.7 (36.5)   Oral   72   16   120/61   Lying   99    03/18/21 1028   --   --   62   16   --   --   100    03/18/21 1022   --   --   63   18   --   --   94    03/18/21 0757   97.4 (36.3)   Axillary   67   16   110/59   Lying   95    03/18/21 0734   --   --   64   16   --   --   --    03/18/21 0728   --   --   70   16   --   --   96    03/18/21 0315   97.6 (36.4)   Axillary   72   16   97/54   Lying   93    03/18/21 0050   98.3 (36.8)   Oral   93   18   126/77   Sitting   96    03/17/21 2251   --   --   87   --   --   --   94    03/17/21 2230   --   --   115   --   --   --   (!) 88    03/17/21 2156   --   --   --   18   --   --   --    03/17/21 2153   --   --   73   18   --   --   97    03/17/21 2045   --   --   76   --   108/71   --   94    03/17/21 2024   97.7 (36.5)   Oral   --   --   --   --   --    03/17/21 2021   --   --   79   --   119/62   --   93    03/17/21 2007   --   --   77   15   --   --   93            Oxygen Therapy (last 2 days)     Date/Time   SpO2   Device (Oxygen Therapy)   Flow (L/min)   Oxygen Concentration (%)   ETCO2 (mmHg)    03/18/21 2340   93   CPAP   --   --   --    03/18/21 2300   96   CPAP   --   --   --    03/1955   95   room air   --   --   --    03/18/21 1555   96   room air   --   --   --    03/18/21 1542   96   room air   --   --   --    03/18/21 1100   99   nasal cannula   1   --   --    03/18/21 1028   100   nasal cannula   1   --   --    03/18/21  1022   94   nasal cannula   1   --   --    03/18/21 0925   --   nasal cannula   1   --   --    03/18/21 0757   95   --   1   --   --    03/18/21 0728   96   --   1   --   --    03/18/21 0315   93   other (see comments)   --   --   --    03/18/21 0050   96   nasal cannula   1   --   --    03/17/21 2251   94   --   --   --   --    03/17/21 2230   (!) 88   --   --   --   --    03/17/21 2156   --   nasal cannula   1   --   --    03/17/21 2153   97   nasal cannula   1   --   --    03/17/21 2045   94   --   --   --   --    03/17/21 2021   93   --   --   --   --    03/17/21 2007 93   room air   --   --   --            Intake & Output (last 2 days)       03/17 0701 - 03/18 0700 03/18 0701 - 03/19 0700    IV Piggyback 100 200    Total Intake(mL/kg) 100 (0.9) 200 (1.8)    Urine (mL/kg/hr)  500 (0.2)    Total Output  500    Net +100 -300          Urine Unmeasured Occurrence 1 x 6 x        Lines, Drains & Airways    Active LDAs     Name:   Placement date:   Placement time:   Site:   Days:    Peripheral IV 03/18/21 2130 Right Forearm   03/18/21 2130    Forearm   less than 1         Inactive LDAs     Name:   Placement date:   Placement time:   Removal date:   Removal time:   Site:   Days:    [REMOVED] Peripheral IV 03/17/21 2020 Left Antecubital   03/17/21 2020 03/18/21 2130    Antecubital   1                  Current Facility-Administered Medications   Medication Dose Route Frequency Provider Last Rate Last Admin   • budesonide-formoterol (SYMBICORT) 160-4.5 MCG/ACT inhaler 2 puff  2 puff Inhalation BID - RT Lyle Kelly MD       • cetirizine (zyrTEC) tablet 10 mg  10 mg Oral Daily Lyle Kelly MD   10 mg at 03/18/21 1116   • clonazePAM (KlonoPIN) tablet 1 mg  1 mg Oral TID Sean Jett MD   1 mg at 03/18/21 2123   • cyclobenzaprine (FLEXERIL) tablet 5 mg  5 mg Oral TID Sean Jett MD   5 mg at 03/18/21 2124   • dextrose (D50W) 25 g/ 50mL Intravenous Solution 25 g  25 g Intravenous Q15  Min PRN Sena Jett MD       • dextrose (GLUTOSE) oral gel 15 g  15 g Oral Q15 Min PRN Sean Jett MD       • DULoxetine (CYMBALTA) DR capsule 90 mg  90 mg Oral Daily Sean Jett MD       • enoxaparin (LOVENOX) syringe 40 mg  40 mg Subcutaneous Q24H Lyle Kelly MD   40 mg at 03/18/21 1809   • famotidine (PEPCID) tablet 20 mg  20 mg Oral BID Lyle Kelly MD   20 mg at 03/18/21 2123   • fluticasone (FLONASE) 50 MCG/ACT nasal spray 2 spray  2 spray Each Nare Daily Lyle Kelly MD   2 spray at 03/18/21 1117   • gabapentin (NEURONTIN) capsule 800 mg  800 mg Oral Q8H Sean Jett MD   800 mg at 03/19/21 0519   • glucagon (human recombinant) (GLUCAGEN DIAGNOSTIC) injection 1 mg  1 mg Subcutaneous Q15 Min PRN Sean Jett MD       • insulin lispro (humaLOG) injection 2-7 Units  2-7 Units Subcutaneous TID AC Sean Jett MD       • ipratropium-albuterol (DUO-NEB) nebulizer solution 3 mL  3 mL Nebulization 4x Daily - RT Sean Jett MD   3 mL at 03/18/21 1542   • melatonin tablet 9.75 mg  9.75 mg Oral Nightly Sean Jett MD   9.75 mg at 03/18/21 2138   • methylPREDNISolone sodium succinate (SOLU-Medrol) injection 40 mg  40 mg Intravenous Q8H Lyle Kelly MD   40 mg at 03/19/21 0519   • nicotine (NICODERM CQ) 14 MG/24HR patch 1 patch  1 patch Transdermal Q24H Lyle Kelly MD       • oxyCODONE-acetaminophen (PERCOCET) 7.5-325 MG per tablet 1 tablet  1 tablet Oral Q8H PRN Sean Jett MD   1 tablet at 03/19/21 0211   • Pharmacy to Dose Zosyn   Does not apply Continuous PRN Lyle Kelly MD       • piperacillin-tazobactam (ZOSYN) 3.375 g in iso-osmotic dextrose 50 ml (premix)  3.375 g Intravenous Q8H Lyle Kelly MD   3.375 g at 03/19/21 0011   • sodium chloride 0.9 % flush 10 mL  10 mL Intravenous PRN Sean Jett MD       • vancomycin (VANCOCIN) in iso-osmotic dextrose IVPB 1 g (premix) 200 mL  1,000  mg Intravenous Q12H SensLyle nieves MD   1,000 mg at 03/18/21 2250       Lab Results (last 24 hours)     Procedure Component Value Units Date/Time    Basic Metabolic Panel [[id number]]  (Abnormal) Collected: 03/19/21 0324    Specimen: Blood Updated: 03/19/21 0417     Glucose 209 mg/dL      BUN 23 mg/dL      Creatinine 0.50 mg/dL      Sodium 139 mmol/L      Potassium 4.0 mmol/L      Chloride 105 mmol/L      CO2 28.0 mmol/L      Calcium 9.2 mg/dL      eGFR Non African Amer 124 mL/min/1.73      BUN/Creatinine Ratio 46.0     Anion Gap 6.0 mmol/L     Narrative:      GFR Normal >60  Chronic Kidney Disease <60  Kidney Failure <15      CBC (No Diff) [[id number]]  (Abnormal) Collected: 03/19/21 0324    Specimen: Blood Updated: 03/19/21 0402     WBC 13.93 10*3/mm3      RBC 3.38 10*6/mm3      Hemoglobin 11.1 g/dL      Hematocrit 34.0 %      .6 fL      MCH 32.8 pg      MCHC 32.6 g/dL      RDW 14.8 %      RDW-SD 55.0 fl      MPV 12.6 fL      Platelets 279 10*3/mm3     Blood Culture - Blood, [[id number]] Collected: 03/17/21 2018    Specimen: Blood Updated: 03/19/21 0015     Blood Culture No growth at 24 hours    Blood Culture With MANAN - Blood, Arm, Left [[id number]] Collected: 03/17/21 2146    Specimen: Blood from Arm, Left Updated: 03/18/21 2200     Blood Culture No growth at 24 hours    S. Pneumo Ag Urine or CSF - Urine, Urine, Clean Catch [[id number]]  (Normal) Collected: 03/18/21 1809    Specimen: Urine, Clean Catch Updated: 03/18/21 1904     Strep Pneumo Ag Negative    Legionella Antigen, Urine - Urine, Urine, Clean Catch [[id number]]  (Normal) Collected: 03/18/21 1809    Specimen: Urine, Clean Catch Updated: 03/18/21 1903     LEGIONELLA ANTIGEN, URINE Negative    Ct, Ng, Mycoplasmas MYRON, Urine - Urine, Clean Catch [[id number]] Collected: 03/18/21 1809    Specimen: Urine, Clean Catch Updated: 03/18/21 1819        Imaging Results (Last 24 Hours)     ** No results found for the last 24 hours. **        Orders (last 24 hrs)       Start     Ordered    03/19/21 0900  DULoxetine (CYMBALTA) DR capsule 90 mg  Daily      03/18/21 2005 03/19/21 0730  insulin lispro (humaLOG) injection 2-7 Units  3 Times Daily Before Meals      03/18/21 2011 03/19/21 0600  CBC (No Diff)  Morning Draw      03/18/21 0934    03/19/21 0600  Basic Metabolic Panel  Morning Draw      03/18/21 0934    03/18/21 2300  vancomycin (VANCOCIN) in iso-osmotic dextrose IVPB 1 g (premix) 200 mL  Every 12 Hours      03/18/21 0934    03/18/21 2200  gabapentin (NEURONTIN) capsule 800 mg  Every 8 Hours Scheduled      03/18/21 2005 03/18/21 2200  POC Glucose Finger 4x Daily AC & at Bedtime  4 Times Daily Before Meals & at Bedtime      03/18/21 2010 03/18/21 2200  POC Glucose 4x Daily AC & at Bedtime  4 Times Daily Before Meals & at Bedtime     Comments: If bedtime blood glucose is greater than 350 mg/dl, call MD.      03/18/21 2011 03/18/21 2100  famotidine (PEPCID) tablet 20 mg  2 Times Daily      03/18/21 1526    03/18/21 2100  clonazePAM (KlonoPIN) tablet 1 mg  3 Times Daily      03/18/21 2005 03/18/21 2012  Do NOT Hold Basal or Correction Scale Insulin When Patient is NPO, Hold Scheduled Mealtime (Bolus) Insulin if NPO  Continuous      03/18/21 2011 03/18/21 2012  Follow [facility] Hypoglycemia Standing Orders For Blood Glucose Less Than 70 mg/dL  Until Discontinued     Comments: ALERT PATIENT - NOT NPO & CAN SAFELY SWALLOW  Administer 4 oz Fruit Juice OR 4 oz Regular Soda OR 8 oz Milk OR 15-30 grams (1 tube) of Glucose Gel.  Recheck Blood Glucose Approximately 15 Minutes After Ingestion, Repeat Treatment & Continue to Recheck Blood Sugar Approximately Every 15 Minutes Until Blood Glucose is 70 or Higher.  Once Blood Glucose is 70 or Higher & if It Will Be More Than 60 Minutes Until Next Meal, Provide Appropriate Snack (Including Carbohydrate Food) Based on Meal Plan Order. Give Meal Tray As Soon As Possible.    PATIENT HAS IV ACCESS - UNRESPONSIVE, NPO OR UNABLE TO  SAFELY SWALLOW  Administer 25g (50ml) D50W IV Push.  Recheck Blood Glucose Approximately 15 Minutes After Administration, if Blood Glucose Remains Less Than 70, Repeat Treatment   Recheck Blood Glucose Approximately 15 Minutes After 2nd Administration, if Blood Glucose Remains Less Than 70 After 2nd Dose of D50W, Contact Provider for Further Treatment Orders & Consider Adding IVF With D5W for Maintenance    PATIENT WITHOUT IV ACCESS - UNRESPONSIVE, NPO OR UNABLE TO SAFELY SWALLOW  Administer 1mg Glucagon SQ & Establish IV Access.  Turn Patient on Side - Nausea / Vomiting May Occur.  Recheck Blood Glucose Approximately 15 Minutes After Administration.  If Blood Glucose Remains Less Than 70, Administer 25g D50W IV Push (50ml).  Recheck Blood Glucose Approximately 15 Minutes After Administration of D50W, if Blood Glucose Remains Less Than 70, Contact Provider for Further Treatment Orders & Consider Adding IVF With D5 for Maintenance    Document Event & Patient Response to Interventions in EMR, Document Medications on MAR  Notify Provider if Hypoglycemia Treatment Needed    03/18/21 2011 03/18/21 2011  dextrose (GLUTOSE) oral gel 15 g  Every 15 Minutes PRN      03/18/21 2011 03/18/21 2011  dextrose (D50W) 25 g/ 50mL Intravenous Solution 25 g  Every 15 Minutes PRN      03/18/21 2011 03/18/21 2011  glucagon (human recombinant) (GLUCAGEN DIAGNOSTIC) injection 1 mg  Every 15 Minutes PRN      03/18/21 2011 03/18/21 1800  enoxaparin (LOVENOX) syringe 40 mg  Every 24 Hours      03/18/21 1526    03/18/21 1700  piperacillin-tazobactam (ZOSYN) 3.375 g in iso-osmotic dextrose 50 ml (premix)  Every 8 Hours      03/18/21 0934    03/18/21 1615  nicotine (NICODERM CQ) 14 MG/24HR patch 1 patch  Every 24 Hours Scheduled      03/18/21 1526    03/18/21 1530  methylPREDNISolone sodium succinate (SOLU-Medrol) injection 40 mg  Every 8 Hours Scheduled      03/18/21 1526    03/18/21 1230  ipratropium-albuterol (DUO-NEB) nebulizer  solution 3 mL  4 Times Daily - RT,   Status:  Discontinued      03/18/21 0934    03/18/21 1100  piperacillin-tazobactam (ZOSYN) 3.375 g in iso-osmotic dextrose 50 ml (premix)  Once      03/18/21 0934    03/18/21 1100  vancomycin 1500 mg/500 mL 0.9% NS IVPB ([facility])  Once      03/18/21 0952    03/18/21 1030  cetirizine (zyrTEC) tablet 10 mg  Daily      03/18/21 0934    03/18/21 1030  fluticasone (FLONASE) 50 MCG/ACT nasal spray 2 spray  Daily      03/18/21 0934    03/18/21 1030  budesonide-formoterol (SYMBICORT) 160-4.5 MCG/ACT inhaler 2 puff  2 Times Daily - RT      03/18/21 0934    03/18/21 1000  Incentive Spirometry  Every 4 Hours While Awake      03/18/21 0934    03/18/21 0933  Pharmacy to dose vancomycin  Continuous PRN,   Status:  Discontinued      03/18/21 0934    03/18/21 0933  Pharmacy to Dose Zosyn  Continuous PRN      03/18/21 0934    03/18/21 0933  Ct, Ng, Mycoplasmas MYRON, Urine - Urine, Clean Catch  Once      03/18/21 0934    03/18/21 0932  S. Pneumo Ag Urine or CSF - Urine, Urine, Clean Catch  Once      03/18/21 0934    03/18/21 0931  Legionella Antigen, Urine - Urine, Urine, Clean Catch  Once      03/18/21 0934    03/18/21 0922  Oscillating Positive Expiratory Pressure (OPEP)  Once      03/18/21 0934    03/18/21 0922  Oxygen Therapy- Nasal Cannula; Titrate for SPO2: 92%  Continuous      03/18/21 0934    03/18/21 0730  ipratropium-albuterol (DUO-NEB) nebulizer solution 3 mL  4 Times Daily - RT      03/18/21 0104    03/18/21 0702  Inpatient Pulmonology Consult  IN AM     Specialty:  Pulmonary Disease  Provider:  Lyle Kelly MD    03/18/21 0106    03/18/21 0200  melatonin tablet 9.75 mg  Nightly      03/18/21 0102 03/18/21 0200  cyclobenzaprine (FLEXERIL) tablet 5 mg  3 Times Daily      03/18/21 0102 03/18/21 0101  oxyCODONE-acetaminophen (PERCOCET) 7.5-325 MG per tablet 1 tablet  Every 8 Hours PRN      03/18/21 0102 03/17/21 2051  Oxygen Therapy- Nasal Cannula; 2 LPM; Titrate for SPO2: equal  to or greater than, 92%  Continuous PRN,   Status:  Canceled      21  sodium chloride 0.9 % flush 10 mL  As Needed      21                Operative/Procedure Notes (all)    No notes of this type exist for this encounter.         Physician Progress Notes (last 24 hours) (Notes from 21 0644 through 21)    No notes of this type exist for this encounter.            Consult Notes (last 24 hours) (Notes from 21 06 through 21)      Lyle Kelly MD at 21 1501      Consult Orders    1. Inpatient Pulmonology Consult [[id number]] ordered by Sean Jett MD at 21 0106                     PULMONARY & CRITICAL CARE CONSULT - [facility]    21, 15:02 CDT  Patient Care Team:  Sean Jett MD as PCP - General (Family Medicine)  Maria Fernanda Dietrich APRN as Nurse Practitioner (Pulmonary Disease)  Lior Mcknight MD as Consulting Physician (Orthopedic Surgery)  Damian Noriega MD as Consulting Physician (Psychiatry)  Rico Scruggs MD as Anesthesiologist (Pain Medicine)  Kenan Yates MD as Consulting Physician (Gastroenterology)  Maria Fernanda Dietrich APRN as Nurse Practitioner (Pulmonary Disease)  Name: Taylor Davies  : 1955  MRN: [id number]  Contact Serial Number [id number]    Chief complaint:   Shortness of breath  Acute on chronic hypoxic respiratory failure  Acute exacerbation of chronic obstructive pulmonary disease  Left lower lobe pneumonia  Ongoing tobacco abuse  Allergic rhinitis  Morbid obesity  Obstructive sleep apnea  On CPAP treatment  Anxiety and depression.  Chronic pain.    HPI:  We have been consulted by Sean Jett MD to see this 65 y.o. female born on 1955.  Patient is a 65 years old  female who was seen as inpatient pulmonary consult in medical floor today.    She was admitted from the ER last night with increasing shortness of breath and  acute hypoxic respiratory failure.  She is an active smoker and keeps smoking on and off for last 4045 years.  She started having shortness of breath about 3 weeks ago and reported she had at least 3 episodes of pneumonia from January which was treated with antibiotics and steroids.  He received outpatient antibiotic coverage and did not improve.  She decided to report to the ER yesterday and initial evaluation shows she has oxygen saturation down in the 70s.  Her lab work showed her blood gas was pH 7.37 PCO2 was 50 and PO2 was 89 with oxygen saturation 94%.  She had no leukocytosis she had she had hyperglycemia but her renal function was normal.  Her Covid screening test was negative.  D-dimer was elevated and she had a CT angiogram done which showed she had a left lower lobe pulmonary infiltrate suggesting pneumonia.  She was given Rocephin and azithromycin and was admitted in inpatient and currently requiring 2 L oxygen.  This morning at the time of the oxygen study down to 1 L.    She reported having some allergy symptoms cough with scanty expectoration but no fever.  She has morbid obesity and was diagnosed with sleep apnea and she is currently using her home CPAP machine at night.  She did not see any pulmonologist in the past but is planned for the pulmonary function test and schedule her an appointment in pulmonary clinic.  Apparently she had been scheduled to see JULIO CESAR Dawson on several occasions from 2019 but all of her past appointments  has been canceled.    Patient has allergy symptoms with postnasal drainage but did not have any regular allergy medications.  She uses only albuterol inhaler at home.  She is not on home oxygen but uses a CPAP at home.  She did not have any exposure to Covid patient any recent travel or sick contact and has been tested negative for Covid during recent hospitalization.    Past Medical History:   has a past medical history of Anxiety, Anxiety, Arthritis, BMI  45.0-49.9, adult (CMS/[facility]) (10/9/2019), Bradycardia, Calcified granuloma of lung (CMS/[facility]) (10/9/2019), Carotid artery stenosis, Chronic rhinitis (2/24/2020), Colon polyp, COPD (chronic obstructive pulmonary disease) (CMS/HCC), Depression, Diabetes mellitus (CMS/HCC), Fibromyalgia, Ground glass opacity present on imaging of lung (11/25/2019), H/O mammogram (03/10/2011), Heartburn, Hyperlipidemia, Hypertension, Kidney stones, Obstructive sleep apnea (10/9/2019), Personal history of nicotine dependence (10/9/2019), PVC (premature ventricular contraction), Sleep apnea, Stroke (CMS/HCC), TIA (transient ischemic attack) (09/01/2016), Tobacco user (10/9/2019), Vitamin B2 deficiency, and Vitamin C deficiency.   has a past surgical history that includes Pap Smear (03/20/2012); Dilation and curettage of uterus; Gastric bypass; Hernia repair; Cholecystectomy; Hemorrhoid surgery; Cataract extraction (2016); Gastric bypass; Hemorrhoid surgery; Tubal ligation; Colonoscopy (01/20/2005); Esophagogastroduodenoscopy (07/01/2011); Cervical spine surgery; and Kidney stone surgery.  Allergies   Allergen Reactions   • Demerol [Meperidine] Itching   • Dilaudid [Hydromorphone] Itching     Dilaudid-5   • Hydromorphone Hcl Itching   • Morphine Sulfate [Morphine] Itching     Medications:  budesonide-formoterol, 2 puff, Inhalation, BID - RT  cetirizine, 10 mg, Oral, Daily  cyclobenzaprine, 5 mg, Oral, TID  fluticasone, 2 spray, Each Nare, Daily  ipratropium-albuterol, 3 mL, Nebulization, 4x Daily - RT  melatonin, 9.75 mg, Oral, Nightly  piperacillin-tazobactam, 3.375 g, Intravenous, Q8H  vancomycin, 1,000 mg, Intravenous, Q12H      Pharmacy to Dose Zosyn,       Family History:  Family History   Problem Relation Age of Onset   • Diabetes Mother    • Hypertension Mother    • Colon polyps Mother    • Osteoporosis Mother    • Dementia Mother    • Colonic polyp Mother    • Diabetes Father    • Other Father         heart problems   • Colon polyps  Father    • Stroke Father    • Dementia Father    • Colonic polyp Father    • Diabetes Sister    • Hypertension Sister    • Diabetes Brother    • Hypertension Brother    • Coronary artery disease Brother    • Diabetes Brother    • Hypertension Brother    • Coronary artery disease Brother    • Diabetes Maternal Grandmother    • Alcohol abuse Maternal Grandmother    • Stroke Maternal Grandmother    • Dementia Maternal Grandmother    • Diabetes Maternal Grandfather    • Diabetes Paternal Grandmother    • Alcohol abuse Paternal Grandmother    • Osteoporosis Paternal Grandmother    • Stroke Paternal Grandmother    • Alcohol abuse Paternal Grandfather    • Breast cancer Neg Hx    • Ovarian cancer Neg Hx    • Uterine cancer Neg Hx    • Colon cancer Neg Hx    • Melanoma Neg Hx    • Prostate cancer Neg Hx      Social History:   reports that she has been smoking cigarettes. She has been smoking about 0.50 packs per day. She has never used smokeless tobacco. She reports that she does not drink alcohol and does not use drugs.  Review of Systems:  Review of Systems   Constitutional: Positive for fatigue and unexpected weight change. Negative for fever.   HENT: Positive for congestion, postnasal drip and rhinorrhea.    Eyes: Negative.    Respiratory: Positive for cough, chest tightness, shortness of breath and wheezing.    Cardiovascular: Positive for leg swelling. Negative for chest pain and palpitations.   Gastrointestinal: Negative.    Endocrine: Negative.    Genitourinary: Negative.    Musculoskeletal: Positive for arthralgias and back pain.   Skin: Negative.    Allergic/Immunologic: Positive for environmental allergies.   Neurological: Negative.    Hematological: Negative.    Psychiatric/Behavioral: The patient is nervous/anxious.       Physical Exam:  Temp:  [97.4 °F (36.3 °C)-98.3 °F (36.8 °C)] 97.7 °F (36.5 °C)  Heart Rate:  [] 72  Resp:  [15-18] 16  BP: ()/(54-77) 120/61    Intake/Output Summary (Last 24  hours) at 3/18/2021 1502  Last data filed at 3/17/2021 2216  Gross per 24 hour   Intake 100 ml   Output --   Net 100 ml         03/17/21 2004   Weight: 113 kg (250 lb)     SpO2 Percentage    03/18/21 1022 03/18/21 1028 03/18/21 1100   SpO2: 94% 100% 99%     Physical Exam  Constitutional:       Appearance: She is obese. She is ill-appearing.      Comments: Middle-aged  female.   HENT:      Head: Normocephalic and atraumatic.      Nose: Nose normal.      Mouth/Throat:      Mouth: Mucous membranes are moist.      Pharynx: Oropharynx is clear. No oropharyngeal exudate or posterior oropharyngeal erythema.      Comments: Mallampati class IV.  Eyes:      General: No scleral icterus.     Conjunctiva/sclera: Conjunctivae normal.      Pupils: Pupils are equal, round, and reactive to light.   Cardiovascular:      Rate and Rhythm: Normal rate and regular rhythm.      Pulses: Normal pulses.      Heart sounds: Normal heart sounds. No murmur heard.   No gallop.    Pulmonary:      Effort: Pulmonary effort is normal.      Breath sounds: Normal breath sounds.   Abdominal:      General: Abdomen is flat. Bowel sounds are normal. There is no distension.      Palpations: Abdomen is soft. There is no mass.      Tenderness: There is no abdominal tenderness.   Genitourinary:     Comments: Not done   Musculoskeletal:         General: No swelling or deformity. Normal range of motion.      Cervical back: Normal range of motion. No rigidity or tenderness.      Right lower leg: No edema.      Left lower leg: No edema.   Lymphadenopathy:      Cervical: No cervical adenopathy.   Skin:     General: Skin is warm and dry.      Capillary Refill: Capillary refill takes less than 2 seconds.      Coloration: Skin is not jaundiced.      Findings: No bruising or lesion.   Neurological:      General: No focal deficit present.      Mental Status: She is alert and oriented to person, place, and time. Mental status is at baseline.      Cranial  Nerves: No cranial nerve deficit.      Sensory: No sensory deficit.      Motor: No weakness.      Gait: Gait normal.   Psychiatric:         Mood and Affect: Mood normal.         Behavior: Behavior normal.         Thought Content: Thought content normal.       Results from last 7 days   Lab Units 03/17/21 2018   WBC 10*3/mm3 11.20*   HEMOGLOBIN g/dL 12.1   PLATELETS 10*3/mm3 267     Results from last 7 days   Lab Units 03/17/21 2018   SODIUM mmol/L 136   POTASSIUM mmol/L 4.0   CO2 mmol/L 26.0   BUN mg/dL 22   CREATININE mg/dL 0.60   GLUCOSE mg/dL 152*     Results from last 7 days   Lab Units 03/17/21  2145   PH, ARTERIAL pH units 7.370   PCO2, ARTERIAL mm Hg 50.4*   PO2 ART mm Hg 89.3     Lab Results   Component Value Date    PROBNP 168.2 03/17/2021     Blood Culture   Date Value Ref Range Status   03/17/2021 No growth at less than 24 hours  Preliminary     Recent radiology:   Imaging Results (Last 72 Hours)     Procedure Component Value Units Date/Time    CT Angiogram Chest [[id number]] Collected: 03/18/21 0709     Updated: 03/18/21 0716    Narrative:      CT ANGIOGRAM CHEST- 3/17/2021 10:56 PM CDT      HISTORY: dyspnea/ elevated dimer      COMPARISON: October 26, 2019.      DOSE LENGTH PRODUCT: 569 mGy cm. Automated exposure control was also  utilized to decrease patient radiation dose.     TECHNIQUE: Helical tomographic images of the chest were obtained after  the administration of intravenous contrast following angiogram protocol.  Additionally, 3D and multiplanar reformatted images were provided.        FINDINGS:    Pulmonary arteries: There is adequate enhancement of the pulmonary  arteries to evaluate for central and segmental pulmonary emboli. There  are no filling defects within the main, lobar, segmental or visualized  subsegmental pulmonary arteries. The pulmonary arteries are within  normal limits for size.      Aorta and great vessels: The aorta is well opacified and demonstrates no  dissection or  aneurysm. The great vessels are normal in appearance.     Visualized neck base: The imaged portion of the base of the neck appears  unremarkable.      Lungs: Airspace filling infiltrates present in the left lower lobe.  Groundglass densities are noted in the middle lobe on the right. A few  focal consolidation and nodular densities are present in the right lower  lobe. The changes are most consistent with pneumonia. The trachea and  bronchial tree are patent.      Heart: Cardiac silhouette is mildly enlarged. There is no pericardial  effusion.      Mediastinum and lymph nodes: No enlarged mediastinal, hilar, or axillary  lymph nodes are present.      Skeletal and soft tissues: The osseous structures of the thorax and  surrounding soft tissues demonstrate no acute process.     Upper abdomen: Small hiatal hernia is present. Postsurgical changes  noted at the esophagogastric junction and fundus the stomach.  Cholecystectomy clips are present.        Impression:      1. No evidence of embolic disease.        2. Pneumonia     3 these images initially reviewed by stat rad March 17 23:38 hours.        This report was finalized on 03/18/2021 07:13 by Dr. Rayshawn Ramirez MD.    XR Chest 2 View [[id number]] Collected: 03/17/21 2116     Updated: 03/17/21 2122    Narrative:      EXAMINATION:  XR CHEST 2 VW-  3/17/2021 9:05 PM CDT     HISTORY: SOA Triage Protocol     COMPARISON: 3/6/2021 and 1/18/2021.     TECHNIQUE: 2 views were obtained.     FINDINGS:  Coarse markings and bronchial wall thickening remain stable.  There is stable patchy infiltrate in both lung bases left greater than  right. The heart is normal in size. There are degenerative changes of  the spine and shoulders. There is thoracic scoliosis to the right. There  has been prior cervical fusion.       Impression:      1. Stable infiltrates in both lung bases left greater than right. This  could be acute or chronic. It appears relatively stable compared back  to  1/18/2021.  2. Stable bronchial wall thickening.         This report was finalized on 03/17/2021 21:19 by Dr. Yoel Box MD.        My radiograph interpretation/independent review of imaging: Reviewed agree with current interpretation.  Other test results (not lab or imaging): Results for orders placed during the hospital encounter of 10/26/19    Adult Transthoracic Echo Limited W/ Cont if Necessary Per Protocol    Interpretation Summary  · Left ventricular systolic function is hyperdynamic (EF > 70).  · Normal size and function of the right ventricle.  · No significant valvular pathology.  Reviewed  Independent review of ekg: Done  Problem List as identified by Epic (may contain historical, inactive problems)  Patient Active Problem List   Diagnosis   • Essential hypertension   • Type 2 diabetes mellitus with diabetic polyneuropathy, without long-term current use of insulin (CMS/HCC)   • Fibromyalgia   • Chronic midline low back pain without sciatica   • Obstructive sleep apnea treated with BiPAP   • Cigarette smoker   • Chronic rhinitis   • Bipolar I disorder with anxious distress (CMS/HCC)   • Class 3 severe obesity due to excess calories with serious comorbidity and body mass index (BMI) of 40.0 to 44.9 in adult (CMS/HCC)   • Benzodiazepine dependence (CMS/HCC)   • Uncomplicated opioid dependence (CMS/HCC)   • Atelectasis   • Hx of colonic polyp   • Family hx colonic polyps   • Incontinence of feces   • Nausea   • Personal history of nicotine dependence   • Family history of polyps in the colon   • History of colon polyps   • Pneumonia due to infectious organism     Pulmonary Assessment:  New problem (to me), with additional workup planned: Acute on chronic hypoxic respiratory failure.  Acute exacerbation of COPD.  Left lower lobe community-acquired pneumonia.  Ongoing tobacco abuse,  New problem (to me), no additional workup planned: Allergic rhinosinusitis, morbid obesity, obstructive sleep apnea on  CPAP treatment.  Other problems either stable, failing to improve or worsenin. Acute on chronic respiratory failure  2. Acute hypoxic respiratory failure requiring supplemental oxygen.  3. Left lower lobe pneumonia  4. Acute exacerbation of chronic obstructive pulmonary disease  5. Ongoing tobacco abuse  6. Allergic rhinitis  7. Anxiety and depression  8. Chronic pain  9. Morbid obesity  10. Obstructive sleep apnea on CPAP treatment    Recommend/plan:   · Patient already had multiple rounds of antibiotic as an outpatient and she presented this time with a left lower lobe pneumonia and I started her on broad-spectrum antibiotic with both gram-positive and gram-negative coverage with Zosyn and vancomycin.  Dose will be adjusted per pharmacy.  · Sputum culture will be obtained and patient will need urine for Legionella pneumococcal antigen.  Mycoplasma antigen is also ordered.  · Antibiotic coverage will be adjusted according to culture results.  · Patient has COPD from ongoing tobacco abuse.  She was started on Symbicort and DuoNeb with albuterol nebulizer inhaler as needed.  She was started on Solu-Medrol which will be tapered slowly.  · Plan for incentive spirometry and flutter valve to improve pulmonary compliance and clearance.  · Patient will need to start on cetirizine and Flonase for nasal allergy.  · She has morbid obesity and has obstructive sleep apnea.  She will continue doing the home CPAP.  · Nicotine patch will be added for tobacco abuse.  · Patient will need patient pulmonary function test and follow-up in the pulmonary clinic.  She was scheduled multiple times for the last few years and had to cancel and obviously there is a noncompliance pattern noted which needs to be addressed at the time of discharge.  · Smoke incisions discussed with the patient.  She will need to continue her CPAP for now and further work-up may be needed for underlying sleep apnea.  · Continue oxygen and titrate to keep  oxygen more than 2%.  She will need DVT and ulcer prophylaxis and will be started on Lovenox and Pepcid.  · Repeat labs will be done tomorrow morning.  At this moment I do not feel she will need another blood gas.  She has marginal hypercapnia during the last blood gas which could related to her underlying obstructive sleep apnea.  · Continue physical activity, and nutritional support.  Repeat labs and imaging studies from time to time.  · CODE STATUS: Full.  Pulmonary team will continue following her and make further recommendations.  · Total time spent in seeing this patient is a pulmonary inpatient consult was 45 minutes.    Thank you for this consult.  We will follow along.    Electronically signed by     Lyle Kelly MD,   Pulmonologist/intensivist.    03/18/21, 3:02 PM CDT.        Electronically signed by Lyle Kelly MD at 03/18/21 4745

## 2021-03-19 NOTE — PROGRESS NOTES
"Pharmacy Dosing Service  Pharmacokinetics  Vancomycin Follow-up Evaluation    Assessment/Action/Plan:  Current Order: Vancomycin 1000 mg IVPB every 12 hours  Current end date:3/25/2021  Levels: Vancomycin trough ordered before dose due on 3/20/2021 at 1100.  Additional antimicrobial agent(s): Zosyn    Vancomycin adjusted to 1250mg iv q12h. Pharmacy will continue to follow daily and adjust dose accordingly.     Subjective:  Taylor Davies is a 65 y.o. female currently on Vancomycin 1000 mg IV every 12 hours for the treatment of pneumonia, day 2 of 7 of treatment.    AUC Model Data:  Regimen: 1250 mg every 12 hours for 8 doses.  Start time: 23:00 on 03/19/2021  Exposure target: AUC24 (range)400-600 mg/L.hr  AUC24,ss: 476 mg/L.hr  PAUC*: 63 %  Ctrough,ss: 12.4 mg/L  Pconc*: 28 %  Tox.: 8 %      Objective:  Ht: 167.6 cm (66\"); Wt: 113 kg (250 lb)  Estimated Creatinine Clearance: 89.4 mL/min (A) (by C-G formula based on SCr of 0.5 mg/dL (L)).   Creatinine   Date Value Ref Range Status   03/19/2021 0.50 (L) 0.57 - 1.00 mg/dL Final   03/17/2021 0.60 0.57 - 1.00 mg/dL Final   03/06/2021 0.61 0.57 - 1.00 mg/dL Final   07/31/2019 0.6 0.5 - 0.9 mg/dL Final      Lab Results   Component Value Date    WBC 13.93 (H) 03/19/2021    WBC 11.20 (H) 03/17/2021    WBC 10.13 03/06/2021       No results found for: VANCOPEAK, VANCOTROUGH, VANCORANDOM    Culture Results:  Microbiology Results (last 10 days)       Procedure Component Value - Date/Time    Legionella Antigen, Urine - Urine, Urine, Clean Catch [[id number]]  (Normal) Collected: 03/18/21 1809    Lab Status: Final result Specimen: Urine, Clean Catch Updated: 03/18/21 1903     LEGIONELLA ANTIGEN, URINE Negative    S. Pneumo Ag Urine or CSF - Urine, Urine, Clean Catch [[id number]]  (Normal) Collected: 03/18/21 1809    Lab Status: Final result Specimen: Urine, Clean Catch Updated: 03/18/21 1904     Strep Pneumo Ag Negative    COVID PRE-OP / PRE-PROCEDURE SCREENING ORDER (NO ISOLATION) " - Swab, Nasopharynx [[id number]]  (Normal) Collected: 03/17/21 2151    Lab Status: Final result Specimen: Swab from Nasopharynx Updated: 03/17/21 2224    Narrative:      The following orders were created for panel order COVID PRE-OP / PRE-PROCEDURE SCREENING ORDER (NO ISOLATION) - Swab, Nasopharynx.  Procedure                               Abnormality         Status                     ---------                               -----------         ------                     COVID-19 and FLU A/B PCR...[[id number]]  Normal              Final result                 Please view results for these tests on the individual orders.    COVID-19 and FLU A/B PCR - Swab, Nasopharynx [[id number]]  (Normal) Collected: 03/17/21 2151    Lab Status: Final result Specimen: Swab from Nasopharynx Updated: 03/17/21 2224     COVID19 Not Detected     Influenza A PCR Not Detected     Influenza B PCR Not Detected    Narrative:      Fact sheet for providers: https://www.fda.gov/media/807610/download    Fact sheet for patients: https://www.fda.gov/media/937129/download    Test performed by PCR.    Blood Culture With MANAN - Blood, Arm, Left [[id number]] Collected: 03/17/21 2146    Lab Status: Preliminary result Specimen: Blood from Arm, Left Updated: 03/18/21 2200     Blood Culture No growth at 24 hours    Blood Culture - Blood, [[id number]] Collected: 03/17/21 2018    Lab Status: Preliminary result Specimen: Blood Updated: 03/19/21 0015     Blood Culture No growth at 24 hours            Bruce Restrepo, PharmD   03/19/21 11:40 CDT

## 2021-03-19 NOTE — NURSING NOTE
Cymbalta capsule found in pt bed while she was ambulating to bathroom. Capsule wasted with Colleen Marin Rn.

## 2021-03-19 NOTE — H&P
History and Physical      CHIEF COMPLAINT:  SOA, Cough    Reason for Admission:  Acute Resp Failure with Hypoxia, Infiltrate on CVXR    History Obtained From:  Patient, chart    HISTORY OF PRESENT ILLNESS:      The patient is a 65 y.o. female who was admitted from ER with increasing SOA, cough. She has no fever. She has had multiple courses of antibiotics and steroids since the end of 2020. She is a smoker. No GI complaints. No dysuria.     Past Medical History:    Past Medical History:   Diagnosis Date   • Anxiety    • Anxiety    • Arthritis    • BMI 45.0-49.9, adult (CMS/[facility]) 10/9/2019   • Bradycardia    • Calcified granuloma of lung (CMS/[facility]) 10/9/2019    Right upper lobe   • Carotid artery stenosis    • Chronic rhinitis 2/24/2020   • Colon polyp    • COPD (chronic obstructive pulmonary disease) (CMS/[facility])    • Depression    • Diabetes mellitus (CMS/[facility])    • Fibromyalgia    • Ground glass opacity present on imaging of lung 11/25/2019   • H/O mammogram 03/10/2011    within normal limits   • Heartburn    • Hyperlipidemia    • Hypertension    • Kidney stones    • Obstructive sleep apnea 10/9/2019   • Personal history of nicotine dependence 10/9/2019   • PVC (premature ventricular contraction)    • Sleep apnea    • Stroke (CMS/[facility])    • TIA (transient ischemic attack) 09/01/2016   • Tobacco user 10/9/2019   • Vitamin B2 deficiency    • Vitamin C deficiency      Past Surgical History:    Past Surgical History:   Procedure Laterality Date   • CATARACT EXTRACTION  2016   • CERVICAL SPINE SURGERY     • CHOLECYSTECTOMY     • COLONOSCOPY  01/20/2005    mixed hyperplastic-adenomatous polyp, severe internal hemorrhoids, non-bleeding   • DILATATION AND CURETTAGE     • ENDOSCOPY  07/01/2011    Status post gastric bypass, candida   • GASTRIC BYPASS     • GASTRIC BYPASS     • HEMORRHOIDECTOMY     • HEMORRHOIDECTOMY     • HERNIA REPAIR     • KIDNEY STONE SURGERY     • PAP SMEAR  03/20/2012    within normal limits   • TUBAL  ABDOMINAL LIGATION           Medications Prior to Admission:    Medications Prior to Admission   Medication Sig Dispense Refill Last Dose   • aspirin 81 MG EC tablet Take 1 tablet by mouth Daily. 90 tablet 3    • busPIRone (BUSPAR) 7.5 MG tablet Take 7.5 mg by mouth 2 (Two) Times a Day.      • Cholecalciferol (VITAMIN D3) 5000 UNITS capsule capsule Take 10,000 Units by mouth daily.      • clonazePAM (KlonoPIN) 1 MG tablet Take 1 mg by mouth 3 (Three) Times a Day.      • cloNIDine (CATAPRES) 0.1 MG tablet Take 0.1 mg by mouth 2 (Two) Times a Day.      • cyclobenzaprine (FLEXERIL) 10 MG tablet Take 5 mg by mouth.      • doxycycline (MONODOX) 100 MG capsule Take 1 capsule by mouth 2 (Two) Times a Day. 20 capsule 0    • DULoxetine (CYMBALTA) 30 MG capsule Take 30 mg by mouth Daily. Take along with the 60 MG to equal 90 MG      • DULoxetine (CYMBALTA) 60 MG capsule Take 90 mg by mouth.      • fluticasone (FLONASE) 50 MCG/ACT nasal spray 2 sprays into the nostril(s) as directed by provider Daily. 18.2 mL 0    • gabapentin (NEURONTIN) 800 MG tablet Take 800 mg by mouth 3 (Three) Times a Day.      • melatonin 5 MG tablet tablet Take 10 mg by mouth Every Night.      • Multiple Vitamin (MULTI VITAMIN DAILY PO) Take 1 tablet by mouth daily.      • oxyCODONE-acetaminophen (PERCOCET) 7.5-325 MG per tablet Take 1 tablet by mouth Every 8 (Eight) Hours As Needed.          Allergies:  Demerol [meperidine], Dilaudid [hydromorphone], Hydromorphone hcl, and Morphine sulfate [morphine]    Social History:   TOBACCO:   reports that she has been smoking cigarettes. She has been smoking about 0.50 packs per day. She has never used smokeless tobacco.  ETOH:   reports no history of alcohol use.  DRUGS:   reports no history of drug use.  MARITAL STATUS:    OCCUPATION:  Not working  Patient currently lives with Family      Family History:   Family History   Problem Relation Age of Onset   • Diabetes Mother    • Hypertension Mother    •  "Colon polyps Mother    • Osteoporosis Mother    • Dementia Mother    • Colonic polyp Mother    • Diabetes Father    • Other Father         heart problems   • Colon polyps Father    • Stroke Father    • Dementia Father    • Colonic polyp Father    • Diabetes Sister    • Hypertension Sister    • Diabetes Brother    • Hypertension Brother    • Coronary artery disease Brother    • Diabetes Brother    • Hypertension Brother    • Coronary artery disease Brother    • Diabetes Maternal Grandmother    • Alcohol abuse Maternal Grandmother    • Stroke Maternal Grandmother    • Dementia Maternal Grandmother    • Diabetes Maternal Grandfather    • Diabetes Paternal Grandmother    • Alcohol abuse Paternal Grandmother    • Osteoporosis Paternal Grandmother    • Stroke Paternal Grandmother    • Alcohol abuse Paternal Grandfather    • Breast cancer Neg Hx    • Ovarian cancer Neg Hx    • Uterine cancer Neg Hx    • Colon cancer Neg Hx    • Melanoma Neg Hx    • Prostate cancer Neg Hx      REVIEW OF SYSTEMS:  Constitutional: neg  CV: neg  Pulmonary: cough, SOA  GI: neg  : neg  Psych: neg  Neuro: neg  Skin: neg  MusculoSkeletal: neg  HEENT: neg  Joints: neg  Vitals:  /59 (BP Location: Right arm, Patient Position: Lying)   Pulse 75   Temp 98.1 °F (36.7 °C) (Oral)   Resp 16   Ht 167.6 cm (66\")   Wt 113 kg (250 lb)   LMP 02/11/2010 (Approximate)   SpO2 95%   BMI 40.35 kg/m²     PHYSICAL EXAM:  Gen: NAD, alert, pleasant  HEENT: WNL  Lymph: no LAD  Neck: no JVD or masses  Chest: coarse  CV: RRR  Abdomen: NT/ND  Extrem: no C/C/E  Neuro: non focal  Skin: no rashes  Joints: no redness  DATA:  I have reviewed the admission labs and imaging tests.    ASSESSMENT AND PLAN:      Acute Resp Failure with Hypoxia, Infiltrate on CXR----continue O2, antibiotics, steroids, follow with Pulm  Chronic Pain   DM2---follow with glucose  COPD, Tobacco Abuse  Obesity, ISABEL    Sean Jtet MD  20:06 CDT 3/18/2021  "

## 2021-03-20 NOTE — PLAN OF CARE
Goal Outcome Evaluation:        Pt was found with home pill box bed side during shift change, the pt was disoriented to situation at this time and argued with staff regarding hospital policy on having home medication while admitted. The pt eventually agreed to let her  take the pill box home with him, which he did. During the 2100 med pass the pt was extremely inquisitive about her scheduled medication especially the dosage and timing of medication. Pt seems more cooperative and less confused this shift. Bipap in place. Lovenox for VTE prevention. IV ABX infusing per order. No distress noted.

## 2021-03-20 NOTE — PROGRESS NOTES
Medicine Progress Note 3/20/2021    Patient:  Taylor CASTRO May  YOB: 1955  MRN: [id number]     Acct: [id number]   Admit date: 3/17/2021    Patient Seen, Chart, Consults notes, Labs, Radiology studies reviewed.    Subjective:   Staff reports issues with patient having & taking extra home meds.  Still taking pain meds from hospital.  Has had episodes of confusion.  Cough & shortness of breath improving.  No fever or chills.  No CP.  Feels a lot better this AM.      Medications:   Scheduled Meds:budesonide-formoterol, 2 puff, Inhalation, BID - RT  cetirizine, 10 mg, Oral, Daily  clonazePAM, 1 mg, Oral, TID  cyclobenzaprine, 5 mg, Oral, TID  DULoxetine, 90 mg, Oral, Daily  enoxaparin, 40 mg, Subcutaneous, Q24H  famotidine, 20 mg, Oral, BID  fluticasone, 2 spray, Each Nare, Daily  gabapentin, 800 mg, Oral, Q8H  insulin lispro, 2-7 Units, Subcutaneous, TID AC  ipratropium-albuterol, 3 mL, Nebulization, 4x Daily - RT  melatonin, 9.75 mg, Oral, Nightly  methylPREDNISolone sodium succinate, 40 mg, Intravenous, Q12H  nicotine, 1 patch, Transdermal, Q24H  piperacillin-tazobactam, 4.5 g, Intravenous, Q8H  vancomycin, 1,250 mg, Intravenous, Q12H      Continuous Infusions:Pharmacy to Dose Zosyn,       PRN Meds: dextrose, 25 g, Q15 Min PRN  dextrose, 15 g, Q15 Min PRN  glucagon (human recombinant), 1 mg, Q15 Min PRN  oxyCODONE-acetaminophen, 1 tablet, Q8H PRN  Pharmacy to Dose Zosyn, , Continuous PRN  sodium chloride, 10 mL, PRN          Objective:   Vitals:   Patient Vitals for the past 24 hrs:   BP Temp Temp src Pulse Resp SpO2   03/20/21 0732 -- -- -- 66 20 99 %   03/20/21 0726 -- -- -- 64 18 98 %   03/20/21 0520 162/77 97.5 °F (36.4 °C) Oral 50 18 97 %   03/19/21 2305 141/68 97.5 °F (36.4 °C) Oral 59 18 97 %   03/19/21 2003 144/59 97.9 °F (36.6 °C) Oral 85 18 95 %   03/19/21 1957 -- -- -- 82 18 --   03/19/21 1944 -- -- -- 80 18 99 %   03/19/21 1816 154/66 97.9 °F (36.6 °C) Oral 81 18 98 %   03/19/21 1528 -- --  -- 78 18 96 %   03/19/21 1521 -- -- -- 74 20 95 %   03/19/21 1132 117/51 97.7 °F (36.5 °C) Oral 72 18 96 %   03/19/21 1123 -- -- -- 69 18 97 %   03/19/21 1107 -- -- -- 52 20 100 %   03/19/21 1102 -- -- -- 61 18 93 %       GEN: Awake, alert, no acute distress.  RESP: Decreased air exchange.  Chest clear to auscultation bilaterally, no wheezes, rales, or rhonchi.  CARD: Regular rate and rhythm.  No murmurs, rubs or gallops.  ABD: Normoactive bowel sounds.  Abdomen soft, nontender, and nondistended.  EXT: No cyanosis, clubbing, or edema.      24 hour intake/output:    Intake/Output Summary (Last 24 hours) at 3/20/2021 0748  Last data filed at 3/19/2021 1728  Gross per 24 hour   Intake 240 ml   Output 450 ml   Net -210 ml     Last 3 weights:  Wt Readings from Last 3 Encounters:   03/17/21 113 kg (250 lb)   03/06/21 114 kg (252 lb)   02/17/21 113 kg (250 lb)       Labs:  Results from last 7 days   Lab Units 03/19/21  0324 03/17/21 2018   WBC 10*3/mm3 13.93* 11.20*   HEMOGLOBIN g/dL 11.1* 12.1   HEMATOCRIT % 34.0 37.1   PLATELETS 10*3/mm3 279 267     Results from last 7 days   Lab Units 03/19/21  0324 03/17/21 2018   SODIUM mmol/L 139 136   POTASSIUM mmol/L 4.0 4.0   CHLORIDE mmol/L 105 102   CO2 mmol/L 28.0 26.0   BUN mg/dL 23 22   CREATININE mg/dL 0.50* 0.60   CALCIUM mg/dL 9.2 9.3   BILIRUBIN mg/dL  --  0.2   ALK PHOS U/L  --  92   ALT (SGPT) U/L  --  30   AST (SGOT) U/L  --  24   GLUCOSE mg/dL 209* 152*         Lab Results   Component Value Date    GLUCOSE 209 (H) 03/19/2021    GLUCOSE 152 (H) 03/17/2021    GLUCOSE 110 (H) 03/06/2021    GLUCOSE 172 (H) 10/28/2019    GLUCOSE 167 (H) 10/26/2019    GLUCOSE 117 (H) 07/31/2019       Radiology:   Imaging Results (Last 72 Hours)     Procedure Component Value Units Date/Time    CT Angiogram Chest [[id number]] Collected: 03/18/21 0709     Updated: 03/18/21 0716    Narrative:      CT ANGIOGRAM CHEST- 3/17/2021 10:56 PM CDT      HISTORY: dyspnea/ elevated dimer      COMPARISON:  October 26, 2019.      DOSE LENGTH PRODUCT: 569 mGy cm. Automated exposure control was also  utilized to decrease patient radiation dose.     TECHNIQUE: Helical tomographic images of the chest were obtained after  the administration of intravenous contrast following angiogram protocol.  Additionally, 3D and multiplanar reformatted images were provided.        FINDINGS:    Pulmonary arteries: There is adequate enhancement of the pulmonary  arteries to evaluate for central and segmental pulmonary emboli. There  are no filling defects within the main, lobar, segmental or visualized  subsegmental pulmonary arteries. The pulmonary arteries are within  normal limits for size.      Aorta and great vessels: The aorta is well opacified and demonstrates no  dissection or aneurysm. The great vessels are normal in appearance.     Visualized neck base: The imaged portion of the base of the neck appears  unremarkable.      Lungs: Airspace filling infiltrates present in the left lower lobe.  Groundglass densities are noted in the middle lobe on the right. A few  focal consolidation and nodular densities are present in the right lower  lobe. The changes are most consistent with pneumonia. The trachea and  bronchial tree are patent.      Heart: Cardiac silhouette is mildly enlarged. There is no pericardial  effusion.      Mediastinum and lymph nodes: No enlarged mediastinal, hilar, or axillary  lymph nodes are present.      Skeletal and soft tissues: The osseous structures of the thorax and  surrounding soft tissues demonstrate no acute process.     Upper abdomen: Small hiatal hernia is present. Postsurgical changes  noted at the esophagogastric junction and fundus the stomach.  Cholecystectomy clips are present.        Impression:      1. No evidence of embolic disease.        2. Pneumonia     3 these images initially reviewed by stat rad March 17 23:38 hours.        This report was finalized on 03/18/2021 07:13 by Dr. Tabares  MD Ashley.    XR Chest 2 View [[id number]] Collected: 03/17/21 2116     Updated: 03/17/21 2122    Narrative:      EXAMINATION:  XR CHEST 2 VW-  3/17/2021 9:05 PM CDT     HISTORY: SOA Triage Protocol     COMPARISON: 3/6/2021 and 1/18/2021.     TECHNIQUE: 2 views were obtained.     FINDINGS:  Coarse markings and bronchial wall thickening remain stable.  There is stable patchy infiltrate in both lung bases left greater than  right. The heart is normal in size. There are degenerative changes of  the spine and shoulders. There is thoracic scoliosis to the right. There  has been prior cervical fusion.       Impression:      1. Stable infiltrates in both lung bases left greater than right. This  could be acute or chronic. It appears relatively stable compared back to  1/18/2021.  2. Stable bronchial wall thickening.         This report was finalized on 03/17/2021 21:19 by Dr. Yoel Box MD.          Cultures:   Blood Culture   Date Value Ref Range Status   03/17/2021 No growth at 2 days  Preliminary   03/17/2021 No growth at 2 days  Preliminary       Assessment/Plan:   Principal Problem: Pneumonia due to infectious organism  Active Hospital Problems    Diagnosis  POA   • **Pneumonia due to infectious organism [J18.9]  Yes   • Uncomplicated opioid dependence (CMS/[facility]) [F11.20]  Yes   • Chronic obstructive pulmonary disease with acute exacerbation (CMS/[facility]) [J44.1]  Unknown   • Obstructive sleep apnea treated with BiPAP [G47.33]  Yes   • Bipolar I disorder with anxious distress (CMS/[facility]) [F31.9]  Yes   • Type 2 diabetes mellitus with diabetic polyneuropathy, without long-term current use of insulin (CMS/[facility]) [E11.42]  Yes      Resolved Hospital Problems   No resolved problems to display.       Continue Abx.  Monitor cultures & adjust regimen as indicated.  Appreciate pulm recs.  Plan transition to PO doxy & augmentin.  Continue controller meds, steroids, bronchodilators, and pulmonary toilet.    Continue supplemental O2.   Wean as tolerated.    Encourage tobacco cessation.  Discussed negative effects on healing process and health in general.  Nicotine patch available.  Limit sedating & respiratory suppression meds as able.  Patient to not take unauthorized meds.       Electronically signed by Benny Wilder MD on 3/20/2021 at 07:48 CDT

## 2021-03-20 NOTE — DISCHARGE SUMMARY
Hospital Discharge Summary    Taylor Davies  :  1955  MRN:  [id number]    Admit date:  3/17/2021  Discharge date:  3/20/2021    Admitting Physician:    Sean Jett MD    Discharge Diagnoses:    Principal Problem: Pneumonia due to infectious organism  Active Hospital Problems    Diagnosis  POA   • **Pneumonia due to infectious organism [J18.9]  Yes   • Uncomplicated opioid dependence (CMS/[facility]) [F11.20]  Yes   • Chronic obstructive pulmonary disease with acute exacerbation (CMS/[facility]) [J44.1]  Unknown   • Obstructive sleep apnea treated with BiPAP [G47.33]  Yes   • Bipolar I disorder with anxious distress (CMS/[facility]) [F31.9]  Yes   • Type 2 diabetes mellitus with diabetic polyneuropathy, without long-term current use of insulin (CMS/[facility]) [E11.42]  Yes      Resolved Hospital Problems   No resolved problems to display.       Consults: pulmonology  Significant Diagnostic Studies: CXR & CTA chest 3/17    Hospital Course:   Taylor Davies is a 65 y.o. female admitted on 3/17/2021 with pneumonia complicated by COPD exacerbation.  She was started on treatment with IV antibiotics and steroids along with serial bronchodilators.  The patient was monitored with serial cardiopulmonary exams.  She was continued on her home medications for her stable chronic medical conditions.  Pulmonology was consulted.  Symbicort was added as a controller medication.  Tobacco cessation was discussed and encouraged.  Hospital course was complicated by reports of patient taking home pain meds and becoming confused.  She improved with the treatment plan.  On day of discharge, patient was pleased with her progress and comfortable with continuing her care at home.        EXAM 3/20/2021   GEN: Awake, alert, no acute distress.  RESP: Full and symmetric respirations.  Chest clear to auscultation bilaterally, no wheezes, rales, or rhonchi.  CARD: Regular rate and rhythm.  No murmurs, rubs or gallops.  ABD: Normoactive bowel sounds.  Abdomen  soft, nontender, and nondistended.  EXT: No cyanosis, clubbing, or edema.         Discharge Medications      New Medications      Instructions Start Date   amoxicillin-clavulanate 875-125 MG per tablet  Commonly known as: Augmentin   1 tablet, Oral, 2 Times Daily      budesonide-formoterol 160-4.5 MCG/ACT inhaler  Commonly known as: SYMBICORT   2 puffs, Inhalation, 2 Times Daily - RT      cetirizine 10 MG tablet  Commonly known as: zyrTEC   10 mg, Oral, Daily   Start Date: March 21, 2021     predniSONE 20 MG tablet  Commonly known as: DELTASONE   40 mg, Oral, Daily With Breakfast   Start Date: March 21, 2021        Continue These Medications      Instructions Start Date   aspirin 81 MG EC tablet   81 mg, Oral, Daily      atorvastatin 40 MG tablet  Commonly known as: LIPITOR   40 mg, Oral, Nightly      busPIRone 7.5 MG tablet  Commonly known as: BUSPAR   7.5 mg, Oral, 2 Times Daily      clonazePAM 1 MG tablet  Commonly known as: KlonoPIN   1 mg, Oral, 3 Times Daily      cloNIDine 0.1 MG tablet  Commonly known as: CATAPRES   0.1 mg, Oral, 3 Times Daily      cyclobenzaprine 10 MG tablet  Commonly known as: FLEXERIL   10 mg, Oral, 3 Times Daily PRN      doxycycline 100 MG capsule  Commonly known as: MONODOX   100 mg, Oral, 2 Times Daily      DULoxetine 60 MG capsule  Commonly known as: CYMBALTA   90 mg, Oral      DULoxetine 30 MG capsule  Commonly known as: CYMBALTA   30 mg, Oral, Daily, Take along with the 60 MG to equal 90 MG      gabapentin 800 MG tablet  Commonly known as: NEURONTIN   800 mg, Oral, 3 Times Daily      ipratropium-albuterol 0.5-2.5 mg/3 ml nebulizer  Commonly known as: DUO-NEB   3 mL, Nebulization, 4 Times Daily PRN      melatonin 5 MG tablet tablet   10 mg, Oral, Nightly      multivitamin tablet tablet  Commonly known as: THERAGRAN   1 tablet, Oral, Daily      oxyCODONE-acetaminophen 7.5-325 MG per tablet  Commonly known as: PERCOCET   1 tablet, Oral, Every 8 Hours PRN      vitamin D3 125 MCG (5000  UT) capsule capsule   10,000 Units, Oral, Daily             Discharge Diet:  low fat  Discharge Activity:  increase to former level as tolerated    Disposition: to home in good condition  Follow up with Sean Jett MD within 1 week.    >30minutes was spent in the discharge planning and coordination process.    Signed:  Benny Wilder MD, 3/20/2021 at 09:02 CDT

## 2021-03-20 NOTE — PLAN OF CARE
Goal Outcome Evaluation:  Plan of Care Reviewed With: patient     Outcome Summary: Pt on room air. Pt using flutter valve and incentive spirometer. Pt reports moderate to severe pain. Pt receiving Percocet for pain. Pt confused intermittently. Pt up with assistance x1. Bed check on when family not at bedside. IV abx. Pt. voiding. VSS.

## 2021-03-20 NOTE — PROGRESS NOTES
Pulmonary     Mar/chart reviewed  Follow up acute on chronic hypoxemic respiratory failure, pneumonia  Some cough, on room air during the day, cpap at night    Vital Sign Min/Max for last 24 hours  Temp  Min: 97.5 °F (36.4 °C)  Max: 97.9 °F (36.6 °C)   BP  Min: 117/51  Max: 162/77   Pulse  Min: 50  Max: 85   Resp  Min: 18  Max: 20   SpO2  Min: 93 %  Max: 100 %   No data recorded   No data recorded     Appears ill, axox3,   perrl, eomi, no icterus,  mmm, no jvd, trachea midline, neck supple,  chest decreased ae bilaterally, no crackles, no wheezes,   rrr,   soft, nt, nd +bs,  no c/c/ e  Skin warm, dry no rashes    Labs: 3/20: reviewed:  Wbc 13.98  hgb 11.1  plts 279  Glucose 209  Bun 23  Cr  0.5  Na 139  Bicarb 28    A/P:  1. Acute on chronic hypercapnic respiratory failure -- patient needs to be on less sedating/respiratory depressing meds if at all  Possible --discussed with patient. Continue NIPPV. At night, with naps and as needed  2. COPD with ae -- transition to po steroids  3. Left lower lobe community-acquired pneumonia -- again probably needs to be on less sedating meds if at all possible -- would be ok with me to transitioin to po augmetin to complete course of antibiotic  4. Tobacco abuse -- patient needs to quit --discussed with patient  5. gary -- nippv  6. Chronic pain   7. Morbid obesity    Ok with me to d/c home.  Discussed with RN  Patient is new to me today

## 2021-03-21 NOTE — OUTREACH NOTE
Prep Survey      Responses   [facility] patient discharged from?  [place] [facility]   Is LACE score < 7 ?  No   Emergency Room discharge w/ pulse ox?  No   Eligibility  Readm Mgmt   Discharge diagnosis  Pneumonia due to infectious organism    Does the patient have one of the following disease processes/diagnoses(primary or secondary)?  COPD/Pneumonia   Does the patient have Home health ordered?  No   Is there a DME ordered?  No   Prep survey completed?  Yes          Susan Davidson RN

## 2021-03-22 NOTE — OUTREACH NOTE
COPD/PN Week 1 Survey      Responses   [facility] patient discharged from?  [place]   Does the patient have one of the following disease processes/diagnoses(primary or secondary)?  COPD/Pneumonia   Was the primary reason for admission:  Pneumonia   Week 1 attempt successful?  Yes   Call start time  1542   Call end time  1547   Discharge diagnosis  Pneumonia due to infectious organism    Is patient permission given to speak with other caregiver?  No   Meds reviewed with patient/caregiver?  Yes   Is the patient having any side effects they believe may be caused by any medication additions or changes?  No   Does the patient have all medications ordered at discharge?  Yes   Is the patient taking all medications as directed (includes completed medication regime)?  Yes   Does the patient have a primary care provider?   Yes   Does the patient have an appointment with their PCP or specialist within 7 days of discharge?  Yes   Has the patient kept scheduled appointments due by today?  N/A   Pulse Ox monitoring  None   Psychosocial issues?  No   Did the patient receive a copy of their discharge instructions?  Yes   Nursing interventions  Reviewed instructions with patient   What is the patient's perception of their health status since discharge?  Improving   Nursing Interventions  Nurse provided patient education   Are the patient's immunizations up to date?   Yes   Nursing interventions  Educated on importance of maintaining up to date immunizations as advised by provider   If the patient is a current smoker, are they able to teach back resources for cessation?  Not a smoker, Smoking cessation medications   Is the patient/caregiver able to teach back the hierarchy of who to call/visit for symptoms/problems? PCP, Specialist, Home health nurse, Urgent Care, ED, 911  Yes   Is the patient/caregiver able to teach back signs and symptoms of worsening condition:  Fever/chills, Shortness of breath, Chest pain   Is the  patient/caregiver able to teach back importance of completing antibiotic course of treatment?  Yes   Week 1 call completed?  Yes   Wrap up additional comments  She is feeling better.           Roya Davidson RN

## 2021-03-30 NOTE — OUTREACH NOTE
COPD/PN Week 2 Survey      Responses   [facility], [place], operated by [facility] patient discharged from?  [place]   Does the patient have one of the following disease processes/diagnoses(primary or secondary)?  COPD/Pneumonia   Was the primary reason for admission:  Pneumonia   Week 2 attempt successful?  Yes   Call start time  1052   Call end time  1055   Meds reviewed with patient/caregiver?  Yes   Is the patient having any side effects they believe may be caused by any medication additions or changes?  No   Does the patient have all medications ordered at discharge?  Yes   Is the patient taking all medications as directed (includes completed medication regime)?  Yes   Medication comments  Has completed antibiotics and steroids   Does the patient have a primary care provider?   Yes   Does the patient have an appointment with their PCP or specialist within 7 days of discharge?  Greater than 7 days   Has the patient kept scheduled appointments due by today?  N/A   Comments  Appt with pcp tomorrow 3/31   Pulse Ox monitoring  None   Psychosocial issues?  No   What is the patient's perception of their health status since discharge?  Improving   Is the patient/caregiver able to teach back the hierarchy of who to call/visit for symptoms/problems? PCP, Specialist, Home health nurse, Urgent Care, ED, 911  Yes   Additional teach back comments  States she is doing well.   Is the patient/caregiver able to teach back signs and symptoms of worsening condition:  Fever/chills, Shortness of breath, Chest pain   Is the patient/caregiver able to teach back importance of completing antibiotic course of treatment?  Yes   Week 2 call completed?  Yes   Wrap up additional comments  Denies questions or needs at this time          Helena Villegas LPN

## 2021-04-07 NOTE — OUTREACH NOTE
COPD/PN Week 3 Survey      Responses   [facility], [place], operated by [facility] patient discharged from?  [place]   Does the patient have one of the following disease processes/diagnoses(primary or secondary)?  COPD/Pneumonia   Was the primary reason for admission:  Pneumonia   Week 3 attempt successful?  No   Unsuccessful attempts  Attempt 1          Mi Orozco RN

## 2021-04-11 NOTE — ED PROVIDER NOTES
Subjective   This is a 66-year-old lady with a past medical history of COPD, diabetes, hypertension, hyperlipidemia who presents in cardiac arrest.  Per EMS patient was last known normal around 2 PM.   was unable to wake her up so EMS was called.  Upon fire arrival BLS was begun.  No shock was advised.  CPR was initiated.  1 epinephrine was given.  Upon ALS arrival and I gel was placed with good end-tidal.  CPR was continued.  Patient was in asystole throughout treatment.  Received 2 further rounds of epinephrine by EMS.      History provided by:  EMS personnel      Review of Systems   Unable to perform ROS: Acuity of condition       Past Medical History:   Diagnosis Date   • Anxiety    • Anxiety    • Arthritis    • BMI 45.0-49.9, adult (CMS/[facility]) 10/9/2019   • Bradycardia    • Calcified granuloma of lung (CMS/[facility]) 10/9/2019    Right upper lobe   • Carotid artery stenosis    • Chronic rhinitis 2/24/2020   • Colon polyp    • COPD (chronic obstructive pulmonary disease) (CMS/[facility])    • Depression    • Diabetes mellitus (CMS/[facility])    • Fibromyalgia    • Ground glass opacity present on imaging of lung 11/25/2019   • H/O mammogram 03/10/2011    within normal limits   • Heartburn    • Hyperlipidemia    • Hypertension    • Kidney stones    • Obstructive sleep apnea 10/9/2019   • Personal history of nicotine dependence 10/9/2019   • PVC (premature ventricular contraction)    • Sleep apnea    • Stroke (CMS/[facility])    • TIA (transient ischemic attack) 09/01/2016   • Tobacco user 10/9/2019   • Vitamin B2 deficiency    • Vitamin C deficiency        Allergies   Allergen Reactions   • Demerol [Meperidine] Itching   • Dilaudid [Hydromorphone] Itching     Dilaudid-5   • Hydromorphone Hcl Itching   • Morphine Sulfate [Morphine] Itching       Past Surgical History:   Procedure Laterality Date   • CATARACT EXTRACTION  2016   • CERVICAL SPINE SURGERY     • CHOLECYSTECTOMY     • COLONOSCOPY  01/20/2005    mixed hyperplastic-adenomatous  polyp, severe internal hemorrhoids, non-bleeding   • DILATATION AND CURETTAGE     • ENDOSCOPY  07/01/2011    Status post gastric bypass, candida   • GASTRIC BYPASS     • GASTRIC BYPASS     • HEMORRHOIDECTOMY     • HEMORRHOIDECTOMY     • HERNIA REPAIR     • KIDNEY STONE SURGERY     • PAP SMEAR  03/20/2012    within normal limits   • TUBAL ABDOMINAL LIGATION         Family History   Problem Relation Age of Onset   • Diabetes Mother    • Hypertension Mother    • Colon polyps Mother    • Osteoporosis Mother    • Dementia Mother    • Colonic polyp Mother    • Diabetes Father    • Other Father         heart problems   • Colon polyps Father    • Stroke Father    • Dementia Father    • Colonic polyp Father    • Diabetes Sister    • Hypertension Sister    • Diabetes Brother    • Hypertension Brother    • Coronary artery disease Brother    • Diabetes Brother    • Hypertension Brother    • Coronary artery disease Brother    • Diabetes Maternal Grandmother    • Alcohol abuse Maternal Grandmother    • Stroke Maternal Grandmother    • Dementia Maternal Grandmother    • Diabetes Maternal Grandfather    • Diabetes Paternal Grandmother    • Alcohol abuse Paternal Grandmother    • Osteoporosis Paternal Grandmother    • Stroke Paternal Grandmother    • Alcohol abuse Paternal Grandfather    • Breast cancer Neg Hx    • Ovarian cancer Neg Hx    • Uterine cancer Neg Hx    • Colon cancer Neg Hx    • Melanoma Neg Hx    • Prostate cancer Neg Hx        Social History     Socioeconomic History   • Marital status: Legally      Spouse name: Not on file   • Number of children: Not on file   • Years of education: Not on file   • Highest education level: Not on file   Tobacco Use   • Smoking status: Current Every Day Smoker     Packs/day: 0.50     Types: Cigarettes   • Smokeless tobacco: Never Used   • Tobacco comment: decreasing amount    Vaping Use   • Vaping Use: Never used   Substance and Sexual Activity   • Alcohol use: No   • Drug  use: No   • Sexual activity: Yes     Partners: Male     Birth control/protection: Post-menopausal           Objective   Physical Exam  Nursing note reviewed.   Constitutional:       Comments: Unresponsive.   HENT:      Head: Atraumatic.      Nose: Nose normal.      Mouth/Throat:      Comments: I gel in place.  Eyes:      Comments: Dilated nonresponsive pupils bilaterally.   Cardiovascular:      Comments: Pulseless.  Pulmonary:      Comments: Bilateral breath sounds with bagging by [place].  Abdominal:      General: Abdomen is flat.      Palpations: Abdomen is soft.   Musculoskeletal:         General: No deformity or signs of injury.   Skin:     General: Skin is warm.      Capillary Refill: Capillary refill takes more than 3 seconds.   Neurological:      Comments: Unresponsive.         Procedures           ED Course                                           MDM  Number of Diagnoses or Management Options  Cardiac arrest (CMS/[facility]): new and requires workup  Diagnosis management comments: Patient presents in cardiac arrest.  Upon arrival high-quality CPR was continued.  There is an intraosseous line in place.  I gel is in place with adequate end-tidal.  Patient undergoes 10 minutes of CPR here.  High-quality CPR is performed.  There is no signs of trauma.  I considered pulmonary embolism but no history to suggest this.  I considered pneumothorax however she has bilateral breath sounds.  Bedside ultrasound performed shows no signs of cardiac tamponade.  She has no history of renal disease so do not treat with bicarbonate as it has been shown to worsen outcomes.  Intubation has also been shown to worsen outcomes and we have good end-tidal and good breath sounds with the LMA so DO NOT INTUBATE.  She is given multiple rounds of epinephrine.  She has undergone greater than 20 minutes of CPR.  Bedside ultrasound shows no cardiac activity.  Her only rhythm has been asystole.  After 20 minutes of CPR, only asystole, greater than 5  rounds of epinephrine I do not feel that continuing CPR would provide her benefit.  She has no clear reversible causes of death which have been evaluated by listening to breath sounds as well as bedside ultrasound.  She is pronounced dead.    Risk of Complications, Morbidity, and/or Mortality  Presenting problems: high  Diagnostic procedures: high  Management options: high    Critical Care  Total time providing critical care: < 30 minutes      Final diagnoses:   Cardiac arrest (CMS/[facility])       ED Disposition  ED Disposition     ED Disposition Condition Comment                No follow-up provider specified.       Medication List      No changes were made to your prescriptions during this visit.          Pascual Zavala MD  [phone]

## 2021-04-11 NOTE — ED NOTES
Catherine, house supervisor notified of death. She states she will notify the .      Rafael Cuadra RN  04/11/21 [id number]

## 2021-04-12 NOTE — ED NOTES
[facility] contacted. They will call back to let us know if the body is going to be used for tissue donation.      Rafael Cuadra, RN  04/11/21 [id number]

## 2021-05-17 ENCOUNTER — APPOINTMENT (OUTPATIENT)
Dept: MAMMOGRAPHY | Facility: HOSPITAL | Age: 66
End: 2021-05-17